# Patient Record
Sex: FEMALE | Race: WHITE | Employment: OTHER | URBAN - METROPOLITAN AREA
[De-identification: names, ages, dates, MRNs, and addresses within clinical notes are randomized per-mention and may not be internally consistent; named-entity substitution may affect disease eponyms.]

---

## 2017-01-01 ENCOUNTER — HOSPITAL ENCOUNTER (OUTPATIENT)
Dept: CV DIAGNOSTICS | Facility: HOSPITAL | Age: 72
Discharge: HOME | End: 2017-01-01
Attending: INTERNAL MEDICINE
Payer: MEDICARE

## 2017-01-01 ENCOUNTER — TELEPHONE (OUTPATIENT)
Dept: FAMILY MEDICINE CLINIC | Facility: CLINIC | Age: 72
End: 2017-01-01

## 2017-01-01 DIAGNOSIS — I48.20 ATRIAL FIBRILLATION, CHRONIC (HCC): ICD-10-CM

## 2017-01-01 DIAGNOSIS — M10.9 GOUT, UNSPECIFIED CAUSE, UNSPECIFIED CHRONICITY, UNSPECIFIED SITE: ICD-10-CM

## 2017-01-01 DIAGNOSIS — I10 ESSENTIAL HYPERTENSION, BENIGN: Primary | ICD-10-CM

## 2017-01-01 DIAGNOSIS — E03.9 ACQUIRED HYPOTHYROIDISM: ICD-10-CM

## 2017-01-01 DIAGNOSIS — I10 ESSENTIAL HYPERTENSION, BENIGN: ICD-10-CM

## 2017-01-01 DIAGNOSIS — I48.91 ATRIAL FIBRILLATION, UNSPECIFIED TYPE (HCC): ICD-10-CM

## 2017-01-01 PROCEDURE — 93225 XTRNL ECG REC<48 HRS REC: CPT | Performed by: INTERNAL MEDICINE

## 2017-01-01 PROCEDURE — 93227 XTRNL ECG REC<48 HR R&I: CPT | Performed by: INTERNAL MEDICINE

## 2017-01-01 PROCEDURE — 93226 XTRNL ECG REC<48 HR SCAN A/R: CPT | Performed by: INTERNAL MEDICINE

## 2017-01-01 RX ORDER — LEVOTHYROXINE SODIUM 0.2 MG/1
TABLET ORAL
Qty: 90 TABLET | Refills: 0 | Status: SHIPPED | OUTPATIENT
Start: 2017-01-01 | End: 2018-01-01

## 2017-01-01 RX ORDER — COLCHICINE 0.6 MG/1
TABLET ORAL
Qty: 90 TABLET | Refills: 0 | Status: SHIPPED | OUTPATIENT
Start: 2017-01-01 | End: 2018-01-01

## 2017-01-01 RX ORDER — AMLODIPINE BESYLATE 10 MG/1
10 TABLET ORAL DAILY
Qty: 90 TABLET | Refills: 0 | Status: SHIPPED | OUTPATIENT
Start: 2017-01-01 | End: 2018-01-01

## 2017-01-01 RX ORDER — LEVOTHYROXINE SODIUM 0.03 MG/1
TABLET ORAL
Qty: 90 TABLET | Refills: 0 | Status: SHIPPED | OUTPATIENT
Start: 2017-01-01 | End: 2018-01-01

## 2017-02-01 ENCOUNTER — TELEPHONE (OUTPATIENT)
Dept: FAMILY MEDICINE CLINIC | Facility: CLINIC | Age: 72
End: 2017-02-01

## 2017-02-01 NOTE — TELEPHONE ENCOUNTER
Justyna Conklin from [facility] called saying she needs a Hard Copy script signed by  for [employer] for [employer]   Fax to [phone]  Pt is in [phone]

## 2017-02-28 ENCOUNTER — HOME CARE VISIT (OUTPATIENT)
Dept: FAMILY MEDICINE CLINIC | Facility: CLINIC | Age: 72
End: 2017-02-28

## 2017-02-28 VITALS
DIASTOLIC BLOOD PRESSURE: 82 MMHG | TEMPERATURE: 98 F | HEART RATE: 87 BPM | SYSTOLIC BLOOD PRESSURE: 154 MMHG | OXYGEN SATURATION: 97 %

## 2017-02-28 DIAGNOSIS — M25.561 RIGHT KNEE PAIN, UNSPECIFIED CHRONICITY: ICD-10-CM

## 2017-02-28 DIAGNOSIS — M25.551 RIGHT HIP PAIN: Primary | ICD-10-CM

## 2017-02-28 NOTE — PROGRESS NOTES
/82 mmHg  Pulse 87  Temp(Src) 97.7 °F (36.5 °C) (Oral)  SpO2 97%              Patient presents with:  Sciatica: Jake       HPI;    Jennifer Dinh is a 70year old female.   RIGHT hip pain radiating to the RIGHT knee, for at least a week, kenn Levothyroxine Sodium (SYNTHROID) 200 MCG Oral Tab Take 1 tablet by mouth  every day Disp: 90 tablet Rfl: 2   Benazepril HCl (LOTENSIN) 20 MG Oral Tab Take 1 tablet by mouth two  times daily Disp: 180 tablet Rfl: 3   furosemide (LASIX) 40 MG Oral Tab Take treat    The patient indicates understanding of these issues and agrees to the plan.   Imaging & Consults:  None  Meds & Refills for this Visit:  No prescriptions requested or ordered in this encounter  No orders of the defined types were placed in this enc

## 2017-03-01 ENCOUNTER — TELEPHONE (OUTPATIENT)
Dept: FAMILY MEDICINE CLINIC | Facility: CLINIC | Age: 72
End: 2017-03-01

## 2017-03-01 NOTE — TELEPHONE ENCOUNTER
Bronwyn from [facility] health called, pt can not remember which pain medication she takes, please call Anthony Gaines and advise.

## 2017-03-02 NOTE — TELEPHONE ENCOUNTER
Made several attempts to call nurse from [address] [place] [address] Rd in regards to her question, telephone goes to  Busy signal, closing this encounter

## 2017-04-04 ENCOUNTER — TELEPHONE (OUTPATIENT)
Dept: FAMILY MEDICINE CLINIC | Facility: CLINIC | Age: 72
End: 2017-04-04

## 2017-04-04 NOTE — TELEPHONE ENCOUNTER
The patient is a diabetic but she is refusing to take any medications  Check hemoglobin A1c every 3 months  Her last A1c was 6.6  The diagnosis can be changed 2 diabetes mellitus-diet controlled

## 2017-04-04 NOTE — TELEPHONE ENCOUNTER
Chalo Hendrix a nurse at [facility] stopped by asked, what should be done about Ferd Holstein. She currently has a diabetes diagnosis, but is not on any diabatic medications.  Should She be prescribed medications or should her A1C be retested to see if her Diabetes

## 2017-04-17 ENCOUNTER — TELEPHONE (OUTPATIENT)
Dept: FAMILY MEDICINE CLINIC | Facility: CLINIC | Age: 72
End: 2017-04-17

## 2017-04-17 DIAGNOSIS — K64.9 HEMORRHOIDS, UNSPECIFIED HEMORRHOID TYPE: Primary | ICD-10-CM

## 2017-04-17 NOTE — TELEPHONE ENCOUNTER
Spoke with [facility] home health nurse, she stated she will call patient and see if patient is agreeable to have rectal suppositories, she will call this office back to inform of patients decision

## 2017-04-17 NOTE — TELEPHONE ENCOUNTER
[facility] refuses to schedule appointment at [facility] stating that the office is not big enough for her, per [facility] home health nurse, patient has had rectal bleeding every time she sits on toilet for several days and has had this in the past, Home heal

## 2017-04-17 NOTE — TELEPHONE ENCOUNTER
Nelosn Giordano from [facility] called to relay the following: Pt reports effective since Saturday: Bleeding Hemorids everytime sits on toilet.  Refuses to make a doctor appointment or go to the hospital.

## 2017-04-18 ENCOUNTER — HOME CARE VISIT (OUTPATIENT)
Dept: FAMILY MEDICINE CLINIC | Facility: CLINIC | Age: 72
End: 2017-04-18

## 2017-04-18 VITALS
SYSTOLIC BLOOD PRESSURE: 132 MMHG | OXYGEN SATURATION: 98 % | HEART RATE: 81 BPM | TEMPERATURE: 98 F | DIASTOLIC BLOOD PRESSURE: 82 MMHG

## 2017-04-18 DIAGNOSIS — K64.9 BLEEDING HEMORRHOIDS: Primary | ICD-10-CM

## 2017-04-20 ENCOUNTER — MED REC SCAN ONLY (OUTPATIENT)
Dept: FAMILY MEDICINE CLINIC | Facility: CLINIC | Age: 72
End: 2017-04-20

## 2017-04-24 RX ORDER — ATORVASTATIN CALCIUM 10 MG/1
TABLET, FILM COATED ORAL
Qty: 90 TABLET | Refills: 3 | Status: SHIPPED | OUTPATIENT
Start: 2017-04-24 | End: 2018-01-01

## 2017-04-24 RX ORDER — METOPROLOL TARTRATE 50 MG/1
50 TABLET, FILM COATED ORAL
Qty: 60 TABLET | Refills: 6 | Status: SHIPPED | OUTPATIENT
Start: 2017-04-24 | End: 2017-11-09

## 2017-04-24 RX ORDER — BENAZEPRIL HYDROCHLORIDE 20 MG/1
TABLET ORAL
Qty: 180 TABLET | Refills: 3 | Status: SHIPPED | OUTPATIENT
Start: 2017-04-24 | End: 2018-01-01

## 2017-04-24 NOTE — TELEPHONE ENCOUNTER
Pending Prescriptions Disp Refills    Atorvastatin Calcium 10 MG Oral Tab 90 tablet 3     Sig: Take 1 tablet by mouth  daily      Benazepril HCl 20 MG Oral Tab 180 tablet 3     Sig: Take 1 tablet by mouth two  times daily      Metoprolol Tartrate 50 MG O

## 2017-04-25 ENCOUNTER — HOME CARE VISIT (OUTPATIENT)
Dept: FAMILY MEDICINE CLINIC | Facility: CLINIC | Age: 72
End: 2017-04-25

## 2017-04-25 VITALS
SYSTOLIC BLOOD PRESSURE: 130 MMHG | TEMPERATURE: 98 F | HEART RATE: 82 BPM | OXYGEN SATURATION: 93 % | DIASTOLIC BLOOD PRESSURE: 82 MMHG

## 2017-04-25 DIAGNOSIS — E11.8 UNCONTROLLED TYPE 2 DIABETES MELLITUS WITH COMPLICATION, WITHOUT LONG-TERM CURRENT USE OF INSULIN (HCC): ICD-10-CM

## 2017-04-25 DIAGNOSIS — E66.01 MORBID OBESITY DUE TO EXCESS CALORIES (HCC): ICD-10-CM

## 2017-04-25 DIAGNOSIS — N39.0 URINARY TRACT INFECTION WITHOUT HEMATURIA, SITE UNSPECIFIED: Primary | ICD-10-CM

## 2017-04-25 DIAGNOSIS — I10 ESSENTIAL HYPERTENSION: ICD-10-CM

## 2017-04-25 DIAGNOSIS — E11.65 UNCONTROLLED TYPE 2 DIABETES MELLITUS WITH COMPLICATION, WITHOUT LONG-TERM CURRENT USE OF INSULIN (HCC): ICD-10-CM

## 2017-04-26 NOTE — PROGRESS NOTES
/82 mmHg  Pulse 82  Temp(Src) 97.6 °F (36.4 °C) (Oral)  SpO2 93%              Patient presents with:   Follow - Up: Jake-- lab results  Medication Request: Pt needs a Rx of macrobid sent to her pharmacy [facility];    Angelina Chau i daily with food. Disp: 30 tablet Rfl: 0   Acidophilus/Pectin Oral Cap Take 1 capsule by mouth daily. Disp: 30 capsule Rfl: 6   colchicine 0.6 MG Oral Tab Take 1 tablet (0.6 mg total) by mouth daily.  Disp: 30 tablet Rfl: 6   HYDROcodone-acetaminophen 5-325 orders for this visit:    Urinary tract infection without hematuria, site unspecified  Handwritten prescription for Macrobid 100 mg by mouth twice a day given to the patient  She will send it to the PHARMACY  Essential hypertension  Continue the current me for this Visit:  No prescriptions requested or ordered in this encounter  No orders of the defined types were placed in this encounter.            [address] [facility]  [facility] [address]        Diabetes Care Dilated

## 2017-06-12 ENCOUNTER — TELEPHONE (OUTPATIENT)
Dept: FAMILY MEDICINE CLINIC | Facility: CLINIC | Age: 72
End: 2017-06-12

## 2017-07-12 ENCOUNTER — TELEPHONE (OUTPATIENT)
Dept: FAMILY MEDICINE CLINIC | Facility: CLINIC | Age: 72
End: 2017-07-12

## 2017-07-12 NOTE — TELEPHONE ENCOUNTER
Chandni Roberto from [facility] called asking for History & Physical to be faxed [phone]

## 2017-08-07 ENCOUNTER — TELEPHONE (OUTPATIENT)
Dept: FAMILY MEDICINE CLINIC | Facility: CLINIC | Age: 72
End: 2017-08-07

## 2017-08-07 NOTE — TELEPHONE ENCOUNTER
Patient has episodes of monthly vaginal bleeding that only lasts for about 2-4 days, but this time she states its been going on for one week and reports it as being heavy, she has been told in the past that she needs surgery but refused, she was offered ap

## 2017-08-07 NOTE — TELEPHONE ENCOUNTER
Tammy from [facility] called saying Pt is having heavy vaginal bleeding for the 7 days.  It's usually 2-3 days, Asking for blood work orders for CBC, Hemoglobin & A-1C, wants to check if Anemic

## 2017-08-08 ENCOUNTER — MED REC SCAN ONLY (OUTPATIENT)
Dept: FAMILY MEDICINE CLINIC | Facility: CLINIC | Age: 72
End: 2017-08-08

## 2017-09-06 ENCOUNTER — TELEPHONE (OUTPATIENT)
Dept: FAMILY MEDICINE CLINIC | Facility: CLINIC | Age: 72
End: 2017-09-06

## 2017-09-06 NOTE — TELEPHONE ENCOUNTER
[facility] from [address] [place] [address] left a voicemail saying Pt has not taken her Potassium for the last 5 days. Pt tols him she did not need it anymore. Please advise.

## 2017-09-12 ENCOUNTER — ASST LIVING (OUTPATIENT)
Dept: FAMILY MEDICINE CLINIC | Facility: CLINIC | Age: 72
End: 2017-09-12

## 2017-09-12 VITALS — SYSTOLIC BLOOD PRESSURE: 136 MMHG | DIASTOLIC BLOOD PRESSURE: 86 MMHG | OXYGEN SATURATION: 98 % | HEART RATE: 94 BPM

## 2017-09-12 DIAGNOSIS — I87.2 VENOUS (PERIPHERAL) INSUFFICIENCY: ICD-10-CM

## 2017-09-12 DIAGNOSIS — I10 ESSENTIAL HYPERTENSION: Primary | ICD-10-CM

## 2017-09-12 DIAGNOSIS — M17.11 PRIMARY OSTEOARTHRITIS OF RIGHT KNEE: ICD-10-CM

## 2017-09-12 DIAGNOSIS — I48.91 ATRIAL FIBRILLATION, UNSPECIFIED TYPE (HCC): ICD-10-CM

## 2017-09-12 NOTE — PROGRESS NOTES
/86   Pulse 94   SpO2 98%               Patient presents with:  Med Reconcilliation: pt would like to control her own meds       HPI;    Chi Summers is a 70year old female.   Patient is a morbidly obese female who is living in the assisted living 12 suppository Rfl: 0   FLUVIRIN Intramuscular Suspension ADM 0.5ML IM UTD Disp:  Rfl: 0   ammonium lactate 12 % External Lotion Apply thin amount to right and left lower extremity daily.  Disp: 1 Bottle Rfl: 0   Miconazole Nitrate 2 % External Powder Apply apparent distress, morbidly obese  SKIN: no rashes,no suspicious lesions  HEENT: atraumatic, normocephalic,ears and throat are clear  NECK: supple,no adenopathy,no bruits  LUNGS: clear to auscultation  CARDIO: RRR without murmur  GI: good BS's,no masses, H on 04/06/2016  LDL Control due on 10/20/2016  Diabetes Care A1C due on 05/19/2017  Annual Depression Screen due on 07/27/2017  Fall Risk Screening due on 07/31/2017  Influenza Vaccine(1) due on 09/01/2017

## 2017-09-14 ENCOUNTER — MED REC SCAN ONLY (OUTPATIENT)
Dept: FAMILY MEDICINE CLINIC | Facility: CLINIC | Age: 72
End: 2017-09-14

## 2017-09-20 ENCOUNTER — TELEPHONE (OUTPATIENT)
Dept: FAMILY MEDICINE CLINIC | Facility: CLINIC | Age: 72
End: 2017-09-20

## 2017-09-20 NOTE — TELEPHONE ENCOUNTER
Oneil Jurado from [facility] called having questions on two medications. One being \"Amlodpine or Norvasc\".       Also, states Pt told her dr gave her permission to manage her own meds so need to speak to Nurse regarding Pt's Med List.

## 2017-09-20 NOTE — TELEPHONE ENCOUNTER
Spoke with home health nurse Yehuda Soulier and advised her to reconcile patients medications with [facility] as they have a separate chart for patient on assisted living and am not showing amlodipine on patients current medication list. She stated she w

## 2017-09-21 ENCOUNTER — TELEPHONE (OUTPATIENT)
Dept: INTERNAL MEDICINE CLINIC | Facility: CLINIC | Age: 72
End: 2017-09-21

## 2017-09-21 RX ORDER — LEVOTHYROXINE SODIUM 0.2 MG/1
TABLET ORAL
Qty: 90 TABLET | OUTPATIENT
Start: 2017-09-21

## 2017-09-21 RX ORDER — LEVOTHYROXINE SODIUM 0.03 MG/1
TABLET ORAL
Qty: 90 TABLET | OUTPATIENT
Start: 2017-09-21

## 2017-09-22 ENCOUNTER — TELEPHONE (OUTPATIENT)
Dept: FAMILY MEDICINE CLINIC | Facility: CLINIC | Age: 72
End: 2017-09-22

## 2017-09-22 RX ORDER — COLCHICINE 0.6 MG/1
0.6 TABLET ORAL DAILY
Qty: 30 TABLET | Refills: 6 | Status: CANCELLED | OUTPATIENT
Start: 2017-09-22

## 2017-09-22 RX ORDER — HYDROCODONE BITARTRATE AND ACETAMINOPHEN 5; 325 MG/1; MG/1
1-2 TABLET ORAL EVERY 4 HOURS PRN
Qty: 30 TABLET | Refills: 0 | Status: CANCELLED | OUTPATIENT
Start: 2017-09-22

## 2017-09-22 RX ORDER — FUROSEMIDE 40 MG/1
TABLET ORAL
Qty: 90 TABLET | OUTPATIENT
Start: 2017-09-22

## 2017-09-22 RX ORDER — METOPROLOL TARTRATE 50 MG/1
50 TABLET, FILM COATED ORAL
Qty: 180 TABLET | Refills: 6 | Status: CANCELLED | OUTPATIENT
Start: 2017-09-22

## 2017-09-22 RX ORDER — AMLODIPINE BESYLATE 5 MG/1
TABLET ORAL
Qty: 180 TABLET | OUTPATIENT
Start: 2017-09-22

## 2017-09-22 RX ORDER — LEVOTHYROXINE SODIUM 0.03 MG/1
TABLET ORAL
Qty: 90 TABLET | Refills: 2 | Status: CANCELLED | OUTPATIENT
Start: 2017-09-22

## 2017-09-22 RX ORDER — FUROSEMIDE 40 MG/1
TABLET ORAL
Qty: 90 TABLET | Refills: 3 | Status: CANCELLED | OUTPATIENT
Start: 2017-09-22

## 2017-09-22 RX ORDER — LEVOTHYROXINE SODIUM 0.2 MG/1
200 TABLET ORAL
Qty: 90 TABLET | Refills: 2 | Status: CANCELLED | OUTPATIENT
Start: 2017-09-22

## 2017-09-22 NOTE — TELEPHONE ENCOUNTER
Pending Prescriptions Disp Refills    furosemide 40 MG Oral Tab 90 tablet 3     Sig: Take 1 tablet by mouth  daily      Levothyroxine Sodium 200 MCG Oral Tab 90 tablet 2     Sig: Take 1 tablet (200 mcg total) by mouth once daily.       Levothyroxine Sodiu

## 2017-09-22 NOTE — TELEPHONE ENCOUNTER
I called pt and she said she is in an assisted living right now and told [facility] to send refills to Dr. Jonathan Jules in her facility but they sent them to us by mistake-she said for us to not fill them and she will call the pharm back-she is wanting to come back to

## 2017-09-22 NOTE — TELEPHONE ENCOUNTER
Patient states she needs all her medications sent for refills to [facility] and they need to be 90 day prescriptions. This was copied from medication list from 9/12/2017 visit with Dr. Lise Taylor.     Current Outpatient Prescriptions:  Atorvastatin Calcium 10 MG Oral

## 2017-10-04 ENCOUNTER — TELEPHONE (OUTPATIENT)
Dept: FAMILY MEDICINE CLINIC | Facility: CLINIC | Age: 72
End: 2017-10-04

## 2017-10-04 DIAGNOSIS — M10.9 GOUT, UNSPECIFIED CAUSE, UNSPECIFIED CHRONICITY, UNSPECIFIED SITE: ICD-10-CM

## 2017-10-04 DIAGNOSIS — I48.91 ATRIAL FIBRILLATION, UNSPECIFIED TYPE (HCC): ICD-10-CM

## 2017-10-04 DIAGNOSIS — I10 ESSENTIAL HYPERTENSION, BENIGN: Primary | ICD-10-CM

## 2017-10-04 DIAGNOSIS — E03.9 ACQUIRED HYPOTHYROIDISM: ICD-10-CM

## 2017-10-04 RX ORDER — AMLODIPINE BESYLATE 5 MG/1
5 TABLET ORAL DAILY
Qty: 30 TABLET | Refills: 2 | Status: SHIPPED | OUTPATIENT
Start: 2017-10-04 | End: 2017-01-01

## 2017-10-04 RX ORDER — MELATONIN
325
Qty: 30 TABLET | Refills: 2 | Status: SHIPPED | OUTPATIENT
Start: 2017-10-04 | End: 2018-01-01

## 2017-10-04 RX ORDER — LEVOTHYROXINE SODIUM 0.03 MG/1
TABLET ORAL
Qty: 90 TABLET | Refills: 0 | Status: SHIPPED | OUTPATIENT
Start: 2017-10-04 | End: 2017-01-01

## 2017-10-04 RX ORDER — FUROSEMIDE 40 MG/1
TABLET ORAL
Qty: 90 TABLET | Refills: 2 | Status: ON HOLD | OUTPATIENT
Start: 2017-10-04 | End: 2018-01-01

## 2017-10-04 RX ORDER — POTASSIUM CHLORIDE 750 MG/1
10 TABLET, FILM COATED, EXTENDED RELEASE ORAL DAILY
Qty: 30 TABLET | Refills: 2 | Status: ON HOLD | OUTPATIENT
Start: 2017-10-04 | End: 2018-01-01

## 2017-10-04 RX ORDER — COLCHICINE 0.6 MG/1
0.6 TABLET ORAL DAILY
Qty: 30 TABLET | Refills: 2 | Status: SHIPPED | OUTPATIENT
Start: 2017-10-04 | End: 2017-01-01

## 2017-10-04 RX ORDER — LEVOTHYROXINE SODIUM 0.2 MG/1
200 TABLET ORAL
Qty: 90 TABLET | Refills: 0 | Status: SHIPPED | OUTPATIENT
Start: 2017-10-04 | End: 2017-01-01

## 2017-10-04 NOTE — TELEPHONE ENCOUNTER
Signed Prescriptions Disp Refills    colchicine 0.6 MG Oral Tab 30 tablet 2      Sig: Take 1 tablet (0.6 mg total) by mouth daily.         Authorizing Provider: Dixon Arzola        Ordering User: Janet Wick      furosemide 40 MG Oral Tab 90 tablet 2

## 2017-10-25 ENCOUNTER — PRIOR ORIGINAL RECORDS (OUTPATIENT)
Dept: OTHER | Age: 72
End: 2017-10-25

## 2017-10-27 ENCOUNTER — PRIOR ORIGINAL RECORDS (OUTPATIENT)
Dept: OTHER | Age: 72
End: 2017-10-27

## 2017-11-09 DIAGNOSIS — I10 ESSENTIAL HYPERTENSION: Primary | ICD-10-CM

## 2017-11-09 RX ORDER — METOPROLOL TARTRATE 50 MG/1
TABLET, FILM COATED ORAL
Qty: 180 TABLET | Refills: 0 | Status: SHIPPED | OUTPATIENT
Start: 2017-11-09 | End: 2018-01-01

## 2017-11-09 NOTE — TELEPHONE ENCOUNTER
Signed Prescriptions Disp Refills    METOPROLOL TARTRATE 50 MG Oral Tab 180 tablet 0      Sig: TAKE 1 TABLET BY MOUTH  TWICE A DAY        Authorizing Provider: Juvencio Mccarthy        Ordering User: Beni Hooks       last home visit 09/12/2017, Patient

## 2017-11-13 ENCOUNTER — PRIOR ORIGINAL RECORDS (OUTPATIENT)
Dept: OTHER | Age: 72
End: 2017-11-13

## 2017-11-15 ENCOUNTER — PRIOR ORIGINAL RECORDS (OUTPATIENT)
Dept: OTHER | Age: 72
End: 2017-11-15

## 2017-11-28 ENCOUNTER — PRIOR ORIGINAL RECORDS (OUTPATIENT)
Dept: OTHER | Age: 72
End: 2017-11-28

## 2017-11-29 LAB
CHOLESTEROL, TOTAL: 89 MG/DL
HDL CHOLESTEROL: 29.9 MG/DL
LDL CHOLESTEROL: 41 MG/DL
THYROID STIMULATING HORMONE: 2.98 MLU/L
TRIGLYCERIDES: 90 MG/DL

## 2017-12-01 ENCOUNTER — PRIOR ORIGINAL RECORDS (OUTPATIENT)
Dept: OTHER | Age: 72
End: 2017-12-01

## 2017-12-05 ENCOUNTER — PRIOR ORIGINAL RECORDS (OUTPATIENT)
Dept: OTHER | Age: 72
End: 2017-12-05

## 2017-12-12 NOTE — TELEPHONE ENCOUNTER
Pending Prescriptions Disp Refills    LEVOTHYROXINE SODIUM 200 MCG Oral Tab [Pharmacy Med Name: LEVOTHYROXINE  0.2MG  TAB] 90 tablet 0     Sig: TAKE 1 TABLET BY MOUTH ONCE DAILY      LEVOTHYROXINE SODIUM 25 MCG Oral Tab [Pharmacy Med Name: LEVOTHYROXINE

## 2017-12-15 NOTE — TELEPHONE ENCOUNTER
Dontae Marques 49 called asking if Pt's dosage was ever corrected/increased on the\"Amlodipine\"? Was 1 pill dailiy but Pt takes 2 per day. Pt is running out earlier due to increasing dosage.

## 2017-12-15 NOTE — TELEPHONE ENCOUNTER
Is patient taking Amlodipine twice daily? If yes then need to correct the quantity.  Please advise , thanks

## 2017-12-18 NOTE — TELEPHONE ENCOUNTER
Signed Prescriptions Disp Refills    AmLODIPine Besylate 10 MG Oral Tab 90 tablet 0      Sig: Take 1 tablet (10 mg total) by mouth daily.         Authorizing Provider: Erna Noonan        Ordering User: Priti Hurtado       with Adonis Lugo at [address] [place] [address]

## 2017-12-18 NOTE — TELEPHONE ENCOUNTER
A prescription for amlodipine 10 mg once a day  , but I cannot give a prescription for 5 mg twice a day as it is not recommended to be taken that way

## 2018-01-01 ENCOUNTER — APPOINTMENT (OUTPATIENT)
Dept: GENERAL RADIOLOGY | Facility: HOSPITAL | Age: 73
DRG: 856 | End: 2018-01-01
Attending: INTERNAL MEDICINE
Payer: MEDICARE

## 2018-01-01 ENCOUNTER — APPOINTMENT (OUTPATIENT)
Dept: ULTRASOUND IMAGING | Facility: HOSPITAL | Age: 73
DRG: 856 | End: 2018-01-01
Attending: INTERNAL MEDICINE
Payer: MEDICARE

## 2018-01-01 ENCOUNTER — PATIENT OUTREACH (OUTPATIENT)
Dept: CASE MANAGEMENT | Age: 73
End: 2018-01-01

## 2018-01-01 ENCOUNTER — MED REC SCAN ONLY (OUTPATIENT)
Dept: FAMILY MEDICINE CLINIC | Facility: CLINIC | Age: 73
End: 2018-01-01

## 2018-01-01 ENCOUNTER — APPOINTMENT (OUTPATIENT)
Dept: ULTRASOUND IMAGING | Facility: HOSPITAL | Age: 73
DRG: 856 | End: 2018-01-01
Attending: NURSE PRACTITIONER
Payer: MEDICARE

## 2018-01-01 ENCOUNTER — ASST LIVING (OUTPATIENT)
Dept: FAMILY MEDICINE CLINIC | Facility: CLINIC | Age: 73
End: 2018-01-01

## 2018-01-01 ENCOUNTER — TELEPHONE (OUTPATIENT)
Dept: FAMILY MEDICINE CLINIC | Facility: CLINIC | Age: 73
End: 2018-01-01

## 2018-01-01 ENCOUNTER — NURSE ONLY (OUTPATIENT)
Dept: LAB | Age: 73
End: 2018-01-01
Attending: FAMILY MEDICINE
Payer: MEDICARE

## 2018-01-01 ENCOUNTER — MED REC SCAN ONLY (OUTPATIENT)
Dept: INTERNAL MEDICINE CLINIC | Facility: CLINIC | Age: 73
End: 2018-01-01

## 2018-01-01 ENCOUNTER — HOSPITAL ENCOUNTER (OUTPATIENT)
Dept: LAB | Facility: HOSPITAL | Age: 73
Discharge: HOME | End: 2018-01-01
Attending: OBSTETRICS & GYNECOLOGY
Payer: MEDICARE

## 2018-01-01 ENCOUNTER — APPOINTMENT (OUTPATIENT)
Dept: GENERAL RADIOLOGY | Facility: HOSPITAL | Age: 73
DRG: 189 | End: 2018-01-01
Attending: INTERNAL MEDICINE
Payer: MEDICARE

## 2018-01-01 ENCOUNTER — APPOINTMENT (OUTPATIENT)
Dept: ULTRASOUND IMAGING | Facility: HOSPITAL | Age: 73
DRG: 189 | End: 2018-01-01
Attending: NURSE PRACTITIONER
Payer: MEDICARE

## 2018-01-01 ENCOUNTER — APPOINTMENT (OUTPATIENT)
Dept: GENERAL RADIOLOGY | Facility: HOSPITAL | Age: 73
DRG: 856 | End: 2018-01-01
Attending: RADIOLOGY
Payer: MEDICARE

## 2018-01-01 ENCOUNTER — APPOINTMENT (OUTPATIENT)
Dept: GENERAL RADIOLOGY | Facility: HOSPITAL | Age: 73
DRG: 856 | End: 2018-01-01
Attending: EMERGENCY MEDICINE
Payer: MEDICARE

## 2018-01-01 ENCOUNTER — APPOINTMENT (OUTPATIENT)
Dept: ULTRASOUND IMAGING | Facility: HOSPITAL | Age: 73
DRG: 739 | End: 2018-01-01
Attending: INTERNAL MEDICINE
Payer: MEDICARE

## 2018-01-01 ENCOUNTER — APPOINTMENT (OUTPATIENT)
Dept: CT IMAGING | Facility: HOSPITAL | Age: 73
DRG: 856 | End: 2018-01-01
Attending: INTERNAL MEDICINE
Payer: MEDICARE

## 2018-01-01 ENCOUNTER — SNF ADMIT/H&P (OUTPATIENT)
Dept: FAMILY MEDICINE CLINIC | Facility: CLINIC | Age: 73
End: 2018-01-01

## 2018-01-01 ENCOUNTER — ANESTHESIA EVENT (OUTPATIENT)
Dept: SURGERY | Facility: HOSPITAL | Age: 73
DRG: 739 | End: 2018-01-01
Payer: MEDICARE

## 2018-01-01 ENCOUNTER — HOSPITAL ENCOUNTER (INPATIENT)
Facility: HOSPITAL | Age: 73
LOS: 33 days | Discharge: HOSPICE | DRG: 856 | End: 2018-01-01
Attending: EMERGENCY MEDICINE | Admitting: STUDENT IN AN ORGANIZED HEALTH CARE EDUCATION/TRAINING PROGRAM
Payer: MEDICARE

## 2018-01-01 ENCOUNTER — APPOINTMENT (OUTPATIENT)
Dept: GENERAL RADIOLOGY | Facility: HOSPITAL | Age: 73
DRG: 189 | End: 2018-01-01
Attending: NURSE PRACTITIONER
Payer: MEDICARE

## 2018-01-01 ENCOUNTER — APPOINTMENT (OUTPATIENT)
Dept: GENERAL RADIOLOGY | Facility: HOSPITAL | Age: 73
DRG: 189 | End: 2018-01-01
Attending: EMERGENCY MEDICINE
Payer: MEDICARE

## 2018-01-01 ENCOUNTER — HOSPITAL ENCOUNTER (OUTPATIENT)
Dept: CV DIAGNOSTICS | Facility: HOSPITAL | Age: 73
Discharge: HOME | End: 2018-01-01
Attending: INTERNAL MEDICINE
Payer: MEDICARE

## 2018-01-01 ENCOUNTER — HOSPITAL ENCOUNTER (INPATIENT)
Facility: HOSPITAL | Age: 73
LOS: 8 days | Discharge: NURSING, REHAB OR LONG-TERM CARE | DRG: 739 | End: 2018-01-01
Attending: OBSTETRICS & GYNECOLOGY | Admitting: OBSTETRICS & GYNECOLOGY
Payer: MEDICARE

## 2018-01-01 ENCOUNTER — HOSPITAL ENCOUNTER (EMERGENCY)
Facility: HOSPITAL | Age: 73
Discharge: HOME | End: 2018-01-01
Attending: EMERGENCY MEDICINE
Payer: MEDICARE

## 2018-01-01 ENCOUNTER — APPOINTMENT (OUTPATIENT)
Dept: INTERVENTIONAL RADIOLOGY/VASCULAR | Facility: HOSPITAL | Age: 73
DRG: 856 | End: 2018-01-01
Attending: INTERNAL MEDICINE
Payer: MEDICARE

## 2018-01-01 ENCOUNTER — APPOINTMENT (OUTPATIENT)
Dept: CV DIAGNOSTICS | Facility: HOSPITAL | Age: 73
DRG: 189 | End: 2018-01-01
Attending: NURSE PRACTITIONER
Payer: MEDICARE

## 2018-01-01 ENCOUNTER — PATIENT MESSAGE (OUTPATIENT)
Dept: CASE MANAGEMENT | Age: 73
End: 2018-01-01

## 2018-01-01 ENCOUNTER — APPOINTMENT (OUTPATIENT)
Dept: CT IMAGING | Facility: HOSPITAL | Age: 73
End: 2018-01-01
Attending: EMERGENCY MEDICINE
Payer: MEDICARE

## 2018-01-01 ENCOUNTER — APPOINTMENT (OUTPATIENT)
Dept: INTERVENTIONAL RADIOLOGY/VASCULAR | Facility: HOSPITAL | Age: 73
DRG: 739 | End: 2018-01-01
Attending: OBSTETRICS & GYNECOLOGY
Payer: MEDICARE

## 2018-01-01 ENCOUNTER — APPOINTMENT (OUTPATIENT)
Dept: GENERAL RADIOLOGY | Facility: HOSPITAL | Age: 73
DRG: 739 | End: 2018-01-01
Attending: INTERNAL MEDICINE
Payer: MEDICARE

## 2018-01-01 ENCOUNTER — APPOINTMENT (OUTPATIENT)
Dept: CT IMAGING | Facility: HOSPITAL | Age: 73
DRG: 856 | End: 2018-01-01
Attending: EMERGENCY MEDICINE
Payer: MEDICARE

## 2018-01-01 ENCOUNTER — HOSPITAL ENCOUNTER (INPATIENT)
Facility: HOSPITAL | Age: 73
LOS: 2 days | Discharge: HOME HEALTH | DRG: 683 | End: 2018-01-01
Attending: EMERGENCY MEDICINE | Admitting: HOSPITALIST
Payer: MEDICARE

## 2018-01-01 ENCOUNTER — APPOINTMENT (OUTPATIENT)
Dept: CV DIAGNOSTICS | Facility: HOSPITAL | Age: 73
DRG: 856 | End: 2018-01-01
Attending: NURSE PRACTITIONER
Payer: MEDICARE

## 2018-01-01 ENCOUNTER — HOSPITAL ENCOUNTER (INPATIENT)
Facility: HOSPITAL | Age: 73
LOS: 7 days | Discharge: HOME HEALTH | DRG: 189 | End: 2018-01-01
Attending: EMERGENCY MEDICINE | Admitting: HOSPITALIST
Payer: MEDICARE

## 2018-01-01 ENCOUNTER — APPOINTMENT (OUTPATIENT)
Dept: INTERVENTIONAL RADIOLOGY/VASCULAR | Facility: HOSPITAL | Age: 73
DRG: 856 | End: 2018-01-01
Attending: NURSE PRACTITIONER
Payer: MEDICARE

## 2018-01-01 ENCOUNTER — HOSPITAL ENCOUNTER (INPATIENT)
Facility: HOSPITAL | Age: 73
LOS: 2 days | DRG: 862 | End: 2018-01-01
Attending: HOSPITALIST | Admitting: HOSPITALIST
Payer: OTHER MISCELLANEOUS

## 2018-01-01 ENCOUNTER — APPOINTMENT (OUTPATIENT)
Dept: GENERAL RADIOLOGY | Facility: HOSPITAL | Age: 73
DRG: 856 | End: 2018-01-01
Attending: NURSE PRACTITIONER
Payer: MEDICARE

## 2018-01-01 ENCOUNTER — APPOINTMENT (OUTPATIENT)
Dept: GENERAL RADIOLOGY | Facility: HOSPITAL | Age: 73
End: 2018-01-01
Attending: EMERGENCY MEDICINE
Payer: MEDICARE

## 2018-01-01 ENCOUNTER — ANESTHESIA (OUTPATIENT)
Dept: SURGERY | Facility: HOSPITAL | Age: 73
DRG: 739 | End: 2018-01-01
Payer: MEDICARE

## 2018-01-01 ENCOUNTER — APPOINTMENT (OUTPATIENT)
Dept: GENERAL RADIOLOGY | Facility: HOSPITAL | Age: 73
DRG: 856 | End: 2018-01-01
Attending: HOSPITALIST
Payer: MEDICARE

## 2018-01-01 ENCOUNTER — APPOINTMENT (OUTPATIENT)
Dept: GENERAL RADIOLOGY | Facility: HOSPITAL | Age: 73
DRG: 856 | End: 2018-01-01
Attending: STUDENT IN AN ORGANIZED HEALTH CARE EDUCATION/TRAINING PROGRAM
Payer: MEDICARE

## 2018-01-01 VITALS
RESPIRATION RATE: 18 BRPM | DIASTOLIC BLOOD PRESSURE: 70 MMHG | SYSTOLIC BLOOD PRESSURE: 126 MMHG | TEMPERATURE: 98 F | HEART RATE: 74 BPM | OXYGEN SATURATION: 96 %

## 2018-01-01 VITALS
HEART RATE: 80 BPM | DIASTOLIC BLOOD PRESSURE: 72 MMHG | TEMPERATURE: 98 F | SYSTOLIC BLOOD PRESSURE: 128 MMHG | RESPIRATION RATE: 18 BRPM | OXYGEN SATURATION: 98 %

## 2018-01-01 VITALS
HEIGHT: 65 IN | TEMPERATURE: 99 F | HEART RATE: 87 BPM | DIASTOLIC BLOOD PRESSURE: 65 MMHG | BODY MASS INDEX: 45.62 KG/M2 | OXYGEN SATURATION: 95 % | SYSTOLIC BLOOD PRESSURE: 124 MMHG | RESPIRATION RATE: 20 BRPM | WEIGHT: 273.81 LBS

## 2018-01-01 VITALS
OXYGEN SATURATION: 100 % | BODY MASS INDEX: 48.82 KG/M2 | TEMPERATURE: 98 F | HEIGHT: 65 IN | HEART RATE: 87 BPM | WEIGHT: 293 LBS | SYSTOLIC BLOOD PRESSURE: 117 MMHG | RESPIRATION RATE: 18 BRPM | DIASTOLIC BLOOD PRESSURE: 49 MMHG

## 2018-01-01 VITALS
BODY MASS INDEX: 44.78 KG/M2 | HEIGHT: 65 IN | SYSTOLIC BLOOD PRESSURE: 111 MMHG | TEMPERATURE: 98 F | WEIGHT: 268.75 LBS | RESPIRATION RATE: 18 BRPM | HEART RATE: 85 BPM | OXYGEN SATURATION: 100 % | DIASTOLIC BLOOD PRESSURE: 58 MMHG

## 2018-01-01 VITALS
DIASTOLIC BLOOD PRESSURE: 81 MMHG | OXYGEN SATURATION: 94 % | TEMPERATURE: 98 F | WEIGHT: 280 LBS | HEIGHT: 65 IN | RESPIRATION RATE: 16 BRPM | SYSTOLIC BLOOD PRESSURE: 138 MMHG | BODY MASS INDEX: 46.65 KG/M2 | HEART RATE: 90 BPM

## 2018-01-01 VITALS
DIASTOLIC BLOOD PRESSURE: 62 MMHG | OXYGEN SATURATION: 92 % | RESPIRATION RATE: 16 BRPM | SYSTOLIC BLOOD PRESSURE: 108 MMHG | TEMPERATURE: 97 F | HEART RATE: 99 BPM

## 2018-01-01 VITALS
HEART RATE: 76 BPM | OXYGEN SATURATION: 100 % | SYSTOLIC BLOOD PRESSURE: 121 MMHG | RESPIRATION RATE: 20 BRPM | DIASTOLIC BLOOD PRESSURE: 68 MMHG | TEMPERATURE: 97 F

## 2018-01-01 VITALS
HEART RATE: 76 BPM | DIASTOLIC BLOOD PRESSURE: 72 MMHG | RESPIRATION RATE: 18 BRPM | OXYGEN SATURATION: 90 % | TEMPERATURE: 98 F | SYSTOLIC BLOOD PRESSURE: 128 MMHG

## 2018-01-01 VITALS
HEART RATE: 76 BPM | DIASTOLIC BLOOD PRESSURE: 86 MMHG | SYSTOLIC BLOOD PRESSURE: 144 MMHG | RESPIRATION RATE: 18 BRPM | OXYGEN SATURATION: 98 % | TEMPERATURE: 98 F

## 2018-01-01 VITALS
HEART RATE: 110 BPM | BODY MASS INDEX: 47 KG/M2 | TEMPERATURE: 98 F | WEIGHT: 284 LBS | RESPIRATION RATE: 18 BRPM | SYSTOLIC BLOOD PRESSURE: 128 MMHG | DIASTOLIC BLOOD PRESSURE: 74 MMHG | OXYGEN SATURATION: 97 %

## 2018-01-01 VITALS
OXYGEN SATURATION: 93 % | WEIGHT: 293 LBS | DIASTOLIC BLOOD PRESSURE: 78 MMHG | TEMPERATURE: 98 F | BODY MASS INDEX: 48.23 KG/M2 | HEART RATE: 86 BPM | RESPIRATION RATE: 18 BRPM | HEIGHT: 65.5 IN | SYSTOLIC BLOOD PRESSURE: 149 MMHG

## 2018-01-01 VITALS
TEMPERATURE: 100 F | SYSTOLIC BLOOD PRESSURE: 52 MMHG | DIASTOLIC BLOOD PRESSURE: 22 MMHG | HEART RATE: 128 BPM | OXYGEN SATURATION: 97 % | RESPIRATION RATE: 16 BRPM

## 2018-01-01 DIAGNOSIS — I48.20 CHRONIC ATRIAL FIBRILLATION (HCC): ICD-10-CM

## 2018-01-01 DIAGNOSIS — E66.01 MORBID OBESITY (HCC): ICD-10-CM

## 2018-01-01 DIAGNOSIS — L03.119 CELLULITIS OF LOWER EXTREMITY, UNSPECIFIED LATERALITY: ICD-10-CM

## 2018-01-01 DIAGNOSIS — R94.31 ABNORMAL EKG: ICD-10-CM

## 2018-01-01 DIAGNOSIS — I48.91 ATRIAL FIBRILLATION, UNSPECIFIED TYPE (HCC): ICD-10-CM

## 2018-01-01 DIAGNOSIS — E86.0 DEHYDRATION: Primary | ICD-10-CM

## 2018-01-01 DIAGNOSIS — I10 HTN (HYPERTENSION): ICD-10-CM

## 2018-01-01 DIAGNOSIS — Z02.9 ENCOUNTERS FOR ADMINISTRATIVE PURPOSE: ICD-10-CM
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
## 2018-01-01 DIAGNOSIS — R06.03 RESPIRATORY DISTRESS: Primary | ICD-10-CM

## 2018-01-01 DIAGNOSIS — Z51.12 ENCOUNTER FOR ANTINEOPLASTIC IMMUNOTHERAPY: ICD-10-CM

## 2018-01-01 DIAGNOSIS — E66.01 CLASS 3 SEVERE OBESITY DUE TO EXCESS CALORIES WITH SERIOUS COMORBIDITY AND BODY MASS INDEX (BMI) OF 40.0 TO 44.9 IN ADULT (HCC): ICD-10-CM

## 2018-01-01 DIAGNOSIS — R19.00 PELVIC MASS: ICD-10-CM

## 2018-01-01 DIAGNOSIS — E03.9 HYPOTHYROIDISM, UNSPECIFIED TYPE: ICD-10-CM

## 2018-01-01 DIAGNOSIS — J18.9 NOSOCOMIAL PNEUMONIA: ICD-10-CM

## 2018-01-01 DIAGNOSIS — R53.81 PHYSICAL DECONDITIONING: ICD-10-CM

## 2018-01-01 DIAGNOSIS — J15.6 GRAM-NEGATIVE PNEUMONIA (HCC): ICD-10-CM

## 2018-01-01 DIAGNOSIS — Z74.09 VERY POOR MOBILITY: ICD-10-CM

## 2018-01-01 DIAGNOSIS — I50.9 ACUTE CONGESTIVE HEART FAILURE, UNSPECIFIED HEART FAILURE TYPE (HCC): Primary | ICD-10-CM

## 2018-01-01 DIAGNOSIS — I10 ESSENTIAL HYPERTENSION, BENIGN: ICD-10-CM

## 2018-01-01 DIAGNOSIS — E11.42 DM TYPE 2 WITH DIABETIC PERIPHERAL NEUROPATHY (HCC): ICD-10-CM

## 2018-01-01 DIAGNOSIS — E03.9 ACQUIRED HYPOTHYROIDISM: ICD-10-CM

## 2018-01-01 DIAGNOSIS — E53.8 B12 DEFICIENCY: ICD-10-CM

## 2018-01-01 DIAGNOSIS — D63.0 ANEMIA IN NEOPLASTIC DISEASE: ICD-10-CM

## 2018-01-01 DIAGNOSIS — Z01.818 PRE-OP EVALUATION: ICD-10-CM

## 2018-01-01 DIAGNOSIS — R79.89 PRERENAL AZOTEMIA: Primary | ICD-10-CM

## 2018-01-01 DIAGNOSIS — R06.2 WHEEZING: ICD-10-CM

## 2018-01-01 DIAGNOSIS — R18.0 ASCITES, MALIGNANT: ICD-10-CM

## 2018-01-01 DIAGNOSIS — E87.5 HYPERKALEMIA: ICD-10-CM

## 2018-01-01 DIAGNOSIS — J20.5 RSV BRONCHITIS: ICD-10-CM

## 2018-01-01 DIAGNOSIS — I89.0 LYMPHEDEMA: Chronic | ICD-10-CM

## 2018-01-01 DIAGNOSIS — N39.0 URINARY TRACT INFECTION WITHOUT HEMATURIA, SITE UNSPECIFIED: ICD-10-CM

## 2018-01-01 DIAGNOSIS — R19.00 ABDOMINAL MASS, UNSPECIFIED ABDOMINAL LOCATION: Primary | ICD-10-CM

## 2018-01-01 DIAGNOSIS — I10 ESSENTIAL HYPERTENSION: ICD-10-CM

## 2018-01-01 DIAGNOSIS — C50.919 BREAST CA (HCC): ICD-10-CM

## 2018-01-01 DIAGNOSIS — R19.00 PELVIC MASS: Primary | ICD-10-CM

## 2018-01-01 DIAGNOSIS — H25.012 CORTICAL AGE-RELATED CATARACT OF LEFT EYE: Primary | ICD-10-CM

## 2018-01-01 DIAGNOSIS — C50.919 MALIGNANT NEOPLASM OF BREAST (FEMALE) (HCC): ICD-10-CM

## 2018-01-01 DIAGNOSIS — D64.9 ANEMIA, UNSPECIFIED TYPE: ICD-10-CM

## 2018-01-01 DIAGNOSIS — H26.9 CATARACT OF BOTH EYES, UNSPECIFIED CATARACT TYPE: ICD-10-CM

## 2018-01-01 DIAGNOSIS — I87.2 VENOUS (PERIPHERAL) INSUFFICIENCY: ICD-10-CM

## 2018-01-01 DIAGNOSIS — L03.115 CELLULITIS OF RIGHT LOWER EXTREMITY: Primary | ICD-10-CM

## 2018-01-01 DIAGNOSIS — M79.89 SWELLING OF LIMB: ICD-10-CM

## 2018-01-01 DIAGNOSIS — I27.20 PULMONARY HYPERTENSION (HCC): ICD-10-CM

## 2018-01-01 DIAGNOSIS — Z51.89 ENCOUNTER FOR VOCATIONAL THERAPY: ICD-10-CM

## 2018-01-01 DIAGNOSIS — I51.9 MYXEDEMA HEART DISEASE: Primary | ICD-10-CM

## 2018-01-01 DIAGNOSIS — I48.0 PAROXYSMAL ATRIAL FIBRILLATION (HCC): ICD-10-CM

## 2018-01-01 DIAGNOSIS — M10.9 GOUT, UNSPECIFIED CAUSE, UNSPECIFIED CHRONICITY, UNSPECIFIED SITE: ICD-10-CM

## 2018-01-01 DIAGNOSIS — E11.9 DM (DIABETES MELLITUS) (HCC): ICD-10-CM

## 2018-01-01 DIAGNOSIS — I89.0 CHRONIC ACQUIRED LYMPHEDEMA: ICD-10-CM

## 2018-01-01 DIAGNOSIS — K59.00 CONSTIPATION: ICD-10-CM

## 2018-01-01 DIAGNOSIS — E03.9 MYXEDEMA HEART DISEASE: Primary | ICD-10-CM

## 2018-01-01 DIAGNOSIS — J90 PLEURAL EFFUSION: Primary | ICD-10-CM

## 2018-01-01 LAB
ALBUMIN SERPL-MCNC: 2.4 G/DL (ref 3.5–4.8)
ALBUMIN SERPL-MCNC: 2.8 G/DL (ref 3.5–4.8)
ALBUMIN/GLOB SERPL: 0.6 {RATIO} (ref 1–2)
ALBUMIN/GLOB SERPL: 0.6 {RATIO} (ref 1–2)
ALP LIVER SERPL-CCNC: 156 U/L (ref 55–142)
ALP LIVER SERPL-CCNC: 159 U/L (ref 55–142)
ALT SERPL-CCNC: 10 U/L (ref 14–54)
ALT SERPL-CCNC: 25 U/L (ref 14–54)
AMB EXT CREATININE: 1.54 MG/DL
AMB EXT GLUCOSE: 107 MG/DL
AMB EXT HEMOGLOBIN: 12
AMB EXT WBC: 7.8 X10(3)UL
ANION GAP SERPL CALC-SCNC: 10 MMOL/L (ref 0–18)
ANION GAP SERPL CALC-SCNC: 10 MMOL/L (ref 0–18)
ANION GAP SERPL CALC-SCNC: 11 MMOL/L (ref 0–18)
ANION GAP SERPL CALC-SCNC: 8 MMOL/L (ref 0–18)
ANTIBODY SCREEN: NEGATIVE
AST SERPL-CCNC: 17 U/L (ref 15–41)
AST SERPL-CCNC: 22 U/L (ref 15–41)
ATRIAL RATE: 115 BPM
BASOPHILS # BLD AUTO: 0.03 X10(3) UL (ref 0–0.1)
BASOPHILS # BLD AUTO: 0.04 X10(3) UL (ref 0–0.1)
BASOPHILS NFR BLD AUTO: 0.4 %
BASOPHILS NFR BLD AUTO: 0.5 %
BILIRUB SERPL-MCNC: 0.5 MG/DL (ref 0.1–2)
BILIRUB SERPL-MCNC: 0.9 MG/DL (ref 0.1–2)
BILIRUB UR QL STRIP.AUTO: NEGATIVE
BILIRUB UR QL STRIP.AUTO: NEGATIVE
BUN BLD-MCNC: 26 MG/DL (ref 8–20)
BUN BLD-MCNC: 64 MG/DL (ref 8–20)
BUN BLD-MCNC: 91 MG/DL (ref 8–20)
BUN BLD-MCNC: 96 MG/DL (ref 8–20)
BUN/CREAT SERPL: 28.3 (ref 10–20)
BUN/CREAT SERPL: 46.8 (ref 10–20)
BUN/CREAT SERPL: 56.1 (ref 10–20)
BUN/CREAT SERPL: 58.3 (ref 10–20)
CALCIUM BLD-MCNC: 7.9 MG/DL (ref 8.3–10.3)
CALCIUM BLD-MCNC: 8.1 MG/DL (ref 8.3–10.3)
CALCIUM BLD-MCNC: 8.3 MG/DL (ref 8.3–10.3)
CALCIUM BLD-MCNC: 8.7 MG/DL (ref 8.3–10.3)
CHLORIDE SERPL-SCNC: 106 MMOL/L (ref 101–111)
CHLORIDE SERPL-SCNC: 106 MMOL/L (ref 101–111)
CHLORIDE SERPL-SCNC: 113 MMOL/L (ref 101–111)
CHLORIDE SERPL-SCNC: 116 MMOL/L (ref 101–111)
CO2 SERPL-SCNC: 18 MMOL/L (ref 22–32)
CO2 SERPL-SCNC: 20 MMOL/L (ref 22–32)
CO2 SERPL-SCNC: 21 MMOL/L (ref 22–32)
CO2 SERPL-SCNC: 24 MMOL/L (ref 22–32)
COLOR UR AUTO: YELLOW
COLOR UR AUTO: YELLOW
CREAT BLD-MCNC: 0.92 MG/DL (ref 0.55–1.02)
CREAT BLD-MCNC: 1.14 MG/DL (ref 0.55–1.02)
CREAT BLD-MCNC: 1.56 MG/DL (ref 0.55–1.02)
CREAT BLD-MCNC: 2.05 MG/DL (ref 0.55–1.02)
EOSINOPHIL # BLD AUTO: 0.08 X10(3) UL (ref 0–0.3)
EOSINOPHIL # BLD AUTO: 0.1 X10(3) UL (ref 0–0.3)
EOSINOPHIL NFR BLD AUTO: 1 %
EOSINOPHIL NFR BLD AUTO: 1.3 %
ERYTHROCYTE [DISTWIDTH] IN BLOOD BY AUTOMATED COUNT: 17.2 % (ref 11.5–16)
ERYTHROCYTE [DISTWIDTH] IN BLOOD BY AUTOMATED COUNT: 18.3 % (ref 11.5–16)
GLOBULIN PLAS-MCNC: 3.8 G/DL (ref 2.5–4)
GLOBULIN PLAS-MCNC: 4.6 G/DL (ref 2.5–3.7)
GLUCOSE BLD-MCNC: 112 MG/DL (ref 70–99)
GLUCOSE BLD-MCNC: 114 MG/DL (ref 70–99)
GLUCOSE BLD-MCNC: 119 MG/DL (ref 70–99)
GLUCOSE BLD-MCNC: 120 MG/DL (ref 70–99)
GLUCOSE UR STRIP.AUTO-MCNC: NEGATIVE MG/DL
GLUCOSE UR STRIP.AUTO-MCNC: NEGATIVE MG/DL
HCT VFR BLD AUTO: 31.4 % (ref 34–50)
HCT VFR BLD AUTO: 39.1 % (ref 34–50)
HGB BLD-MCNC: 12.3 G/DL (ref 12–16)
HGB BLD-MCNC: 9.8 G/DL (ref 12–16)
HYALINE CASTS #/AREA URNS AUTO: PRESENT /LPF
HYALINE CASTS #/AREA URNS AUTO: PRESENT /LPF
IMMATURE GRANULOCYTE COUNT: 0.04 X10(3) UL (ref 0–1)
IMMATURE GRANULOCYTE COUNT: 0.04 X10(3) UL (ref 0–1)
IMMATURE GRANULOCYTE RATIO %: 0.5 %
IMMATURE GRANULOCYTE RATIO %: 0.5 %
KETONES UR STRIP.AUTO-MCNC: NEGATIVE MG/DL
KETONES UR STRIP.AUTO-MCNC: NEGATIVE MG/DL
LACTIC ACID: 1.4 MMOL/L (ref 0.5–2)
LEUKOCYTE ESTERASE UR QL STRIP.AUTO: NEGATIVE
LIPASE: 53 U/L (ref 73–393)
LIPASE: 55 U/L (ref 73–393)
LYMPHOCYTES # BLD AUTO: 1.48 X10(3) UL (ref 0.9–4)
LYMPHOCYTES # BLD AUTO: 2.08 X10(3) UL (ref 0.9–4)
LYMPHOCYTES NFR BLD AUTO: 18.3 %
LYMPHOCYTES NFR BLD AUTO: 26 %
M PROTEIN MFR SERPL ELPH: 6.2 G/DL (ref 6.1–8.3)
M PROTEIN MFR SERPL ELPH: 7.4 G/DL (ref 6.1–8.3)
MCH RBC QN AUTO: 23.8 PG (ref 27–33.2)
MCH RBC QN AUTO: 24 PG (ref 27–33.2)
MCHC RBC AUTO-ENTMCNC: 31.2 G/DL (ref 31–37)
MCHC RBC AUTO-ENTMCNC: 31.5 G/DL (ref 31–37)
MCV RBC AUTO: 76.4 FL (ref 81–100)
MCV RBC AUTO: 76.4 FL (ref 81–100)
MONOCYTES # BLD AUTO: 0.8 X10(3) UL (ref 0.1–1)
MONOCYTES # BLD AUTO: 0.9 X10(3) UL (ref 0.1–1)
MONOCYTES NFR BLD AUTO: 10 %
MONOCYTES NFR BLD AUTO: 11.1 %
NEUTROPHIL ABS PRELIM: 4.95 X10 (3) UL (ref 1.3–6.7)
NEUTROPHIL ABS PRELIM: 5.54 X10 (3) UL (ref 1.3–6.7)
NEUTROPHILS # BLD AUTO: 4.95 X10(3) UL (ref 1.3–6.7)
NEUTROPHILS # BLD AUTO: 5.54 X10(3) UL (ref 1.3–6.7)
NEUTROPHILS NFR BLD AUTO: 61.8 %
NEUTROPHILS NFR BLD AUTO: 68.6 %
NITRITE UR QL STRIP.AUTO: NEGATIVE
NITRITE UR QL STRIP.AUTO: NEGATIVE
OSMOLALITY SERPL CALC.SUM OF ELEC: 296 MOSM/KG (ref 275–295)
OSMOLALITY SERPL CALC.SUM OF ELEC: 317 MOSM/KG (ref 275–295)
OSMOLALITY SERPL CALC.SUM OF ELEC: 317 MOSM/KG (ref 275–295)
OSMOLALITY SERPL CALC.SUM OF ELEC: 321 MOSM/KG (ref 275–295)
P AXIS: -26 DEGREES
PH UR STRIP.AUTO: 5 [PH] (ref 4.5–8)
PH UR STRIP.AUTO: 5 [PH] (ref 4.5–8)
PLATELET # BLD AUTO: 259 10(3)UL (ref 150–450)
PLATELET # BLD AUTO: 275 10(3)UL (ref 150–450)
POTASSIUM SERPL-SCNC: 3.8 MMOL/L (ref 3.6–5.1)
POTASSIUM SERPL-SCNC: 4.7 MMOL/L (ref 3.6–5.1)
POTASSIUM SERPL-SCNC: 4.8 MMOL/L (ref 3.6–5.1)
POTASSIUM SERPL-SCNC: 5.1 MMOL/L (ref 3.6–5.1)
PROT UR STRIP.AUTO-MCNC: 30 MG/DL
PROT UR STRIP.AUTO-MCNC: NEGATIVE MG/DL
Q-T INTERVAL: 328 MS
QRS DURATION: 144 MS
QTC CALCULATION (BEZET): 446 MS
R AXIS: 42 DEGREES
RBC # BLD AUTO: 4.11 X10(6)UL (ref 3.8–5.1)
RBC # BLD AUTO: 5.12 X10(6)UL (ref 3.8–5.1)
RBC #/AREA URNS AUTO: >10 /HPF
RED CELL DISTRIBUTION WIDTH-SD: 47.6 FL (ref 35.1–46.3)
RED CELL DISTRIBUTION WIDTH-SD: 49.4 FL (ref 35.1–46.3)
RH BLOOD TYPE: NEGATIVE
SODIUM SERPL-SCNC: 138 MMOL/L (ref 136–144)
SODIUM SERPL-SCNC: 140 MMOL/L (ref 136–144)
SODIUM SERPL-SCNC: 141 MMOL/L (ref 136–144)
SODIUM SERPL-SCNC: 144 MMOL/L (ref 136–144)
SP GR UR STRIP.AUTO: 1.01 (ref 1–1.03)
SP GR UR STRIP.AUTO: 1.02 (ref 1–1.03)
T AXIS: -30 DEGREES
UROBILINOGEN UR STRIP.AUTO-MCNC: 2 MG/DL
UROBILINOGEN UR STRIP.AUTO-MCNC: <2 MG/DL
VENTRICULAR RATE: 111 BPM
WBC # BLD AUTO: 8 X10(3) UL (ref 4–13)
WBC # BLD AUTO: 8.1 X10(3) UL (ref 4–13)

## 2018-01-01 PROCEDURE — 99232 SBSQ HOSP IP/OBS MODERATE 35: CPT | Performed by: INTERNAL MEDICINE

## 2018-01-01 PROCEDURE — 0W9930Z DRAINAGE OF RIGHT PLEURAL CAVITY WITH DRAINAGE DEVICE, PERCUTANEOUS APPROACH: ICD-10-PCS | Performed by: RADIOLOGY

## 2018-01-01 PROCEDURE — 32555 ASPIRATE PLEURA W/ IMAGING: CPT | Performed by: INTERNAL MEDICINE

## 2018-01-01 PROCEDURE — 71045 X-RAY EXAM CHEST 1 VIEW: CPT | Performed by: INTERNAL MEDICINE

## 2018-01-01 PROCEDURE — 99232 SBSQ HOSP IP/OBS MODERATE 35: CPT | Performed by: HOSPITALIST

## 2018-01-01 PROCEDURE — 99239 HOSP IP/OBS DSCHRG MGMT >30: CPT | Performed by: INTERNAL MEDICINE

## 2018-01-01 PROCEDURE — 1111F DSCHRG MED/CURRENT MED MERGE: CPT | Performed by: FAMILY MEDICINE

## 2018-01-01 PROCEDURE — 99231 SBSQ HOSP IP/OBS SF/LOW 25: CPT | Performed by: HOSPITALIST

## 2018-01-01 PROCEDURE — 71045 X-RAY EXAM CHEST 1 VIEW: CPT | Performed by: NURSE PRACTITIONER

## 2018-01-01 PROCEDURE — 99223 1ST HOSP IP/OBS HIGH 75: CPT | Performed by: SPECIALIST

## 2018-01-01 PROCEDURE — 93970 EXTREMITY STUDY: CPT | Performed by: INTERNAL MEDICINE

## 2018-01-01 PROCEDURE — 83735 ASSAY OF MAGNESIUM: CPT

## 2018-01-01 PROCEDURE — 99233 SBSQ HOSP IP/OBS HIGH 50: CPT | Performed by: SPECIALIST

## 2018-01-01 PROCEDURE — 86901 BLOOD TYPING SEROLOGIC RH(D): CPT | Performed by: OBSTETRICS & GYNECOLOGY

## 2018-01-01 PROCEDURE — 93306 TTE W/DOPPLER COMPLETE: CPT | Performed by: NURSE PRACTITIONER

## 2018-01-01 PROCEDURE — 99233 SBSQ HOSP IP/OBS HIGH 50: CPT | Performed by: NURSE PRACTITIONER

## 2018-01-01 PROCEDURE — 99233 SBSQ HOSP IP/OBS HIGH 50: CPT | Performed by: HOSPITALIST

## 2018-01-01 PROCEDURE — 80053 COMPREHEN METABOLIC PANEL: CPT | Performed by: EMERGENCY MEDICINE

## 2018-01-01 PROCEDURE — 71250 CT THORAX DX C-: CPT | Performed by: INTERNAL MEDICINE

## 2018-01-01 PROCEDURE — 71045 X-RAY EXAM CHEST 1 VIEW: CPT | Performed by: STUDENT IN AN ORGANIZED HEALTH CARE EDUCATION/TRAINING PROGRAM

## 2018-01-01 PROCEDURE — 30233N1 TRANSFUSION OF NONAUTOLOGOUS RED BLOOD CELLS INTO PERIPHERAL VEIN, PERCUTANEOUS APPROACH: ICD-10-PCS | Performed by: NURSE PRACTITIONER

## 2018-01-01 PROCEDURE — 78452 HT MUSCLE IMAGE SPECT MULT: CPT | Performed by: INTERNAL MEDICINE

## 2018-01-01 PROCEDURE — 0UT70ZZ RESECTION OF BILATERAL FALLOPIAN TUBES, OPEN APPROACH: ICD-10-PCS | Performed by: OBSTETRICS & GYNECOLOGY

## 2018-01-01 PROCEDURE — 96361 HYDRATE IV INFUSION ADD-ON: CPT

## 2018-01-01 PROCEDURE — 99239 HOSP IP/OBS DSCHRG MGMT >30: CPT | Performed by: HOSPITALIST

## 2018-01-01 PROCEDURE — 99231 SBSQ HOSP IP/OBS SF/LOW 25: CPT | Performed by: SPECIALIST

## 2018-01-01 PROCEDURE — 99233 SBSQ HOSP IP/OBS HIGH 50: CPT | Performed by: INTERNAL MEDICINE

## 2018-01-01 PROCEDURE — 3E0L3GC INTRODUCTION OF OTHER THERAPEUTIC SUBSTANCE INTO PLEURAL CAVITY, PERCUTANEOUS APPROACH: ICD-10-PCS | Performed by: NURSE PRACTITIONER

## 2018-01-01 PROCEDURE — 93018 CV STRESS TEST I&R ONLY: CPT | Performed by: INTERNAL MEDICINE

## 2018-01-01 PROCEDURE — 99223 1ST HOSP IP/OBS HIGH 75: CPT | Performed by: INTERNAL MEDICINE

## 2018-01-01 PROCEDURE — 93971 EXTREMITY STUDY: CPT | Performed by: INTERNAL MEDICINE

## 2018-01-01 PROCEDURE — 99232 SBSQ HOSP IP/OBS MODERATE 35: CPT | Performed by: SPECIALIST

## 2018-01-01 PROCEDURE — 71045 X-RAY EXAM CHEST 1 VIEW: CPT | Performed by: EMERGENCY MEDICINE

## 2018-01-01 PROCEDURE — 74176 CT ABD & PELVIS W/O CONTRAST: CPT | Performed by: INTERNAL MEDICINE

## 2018-01-01 PROCEDURE — 49083 ABD PARACENTESIS W/IMAGING: CPT | Performed by: INTERNAL MEDICINE

## 2018-01-01 PROCEDURE — 32557 INSERT CATH PLEURA W/ IMAGE: CPT | Performed by: INTERNAL MEDICINE

## 2018-01-01 PROCEDURE — 71045 X-RAY EXAM CHEST 1 VIEW: CPT | Performed by: RADIOLOGY

## 2018-01-01 PROCEDURE — 0DBU0ZZ EXCISION OF OMENTUM, OPEN APPROACH: ICD-10-PCS | Performed by: OBSTETRICS & GYNECOLOGY

## 2018-01-01 PROCEDURE — B548ZZA ULTRASONOGRAPHY OF SUPERIOR VENA CAVA, GUIDANCE: ICD-10-PCS | Performed by: RADIOLOGY

## 2018-01-01 PROCEDURE — 0UT90ZZ RESECTION OF UTERUS, OPEN APPROACH: ICD-10-PCS | Performed by: OBSTETRICS & GYNECOLOGY

## 2018-01-01 PROCEDURE — 83690 ASSAY OF LIPASE: CPT | Performed by: EMERGENCY MEDICINE

## 2018-01-01 PROCEDURE — B547ZZA ULTRASONOGRAPHY OF LEFT SUBCLAVIAN VEIN, GUIDANCE: ICD-10-PCS | Performed by: RADIOLOGY

## 2018-01-01 PROCEDURE — 99232 SBSQ HOSP IP/OBS MODERATE 35: CPT | Performed by: NURSE PRACTITIONER

## 2018-01-01 PROCEDURE — 99152 MOD SED SAME PHYS/QHP 5/>YRS: CPT | Performed by: INTERNAL MEDICINE

## 2018-01-01 PROCEDURE — 85025 COMPLETE CBC W/AUTO DIFF WBC: CPT

## 2018-01-01 PROCEDURE — 0UT20ZZ RESECTION OF BILATERAL OVARIES, OPEN APPROACH: ICD-10-PCS | Performed by: OBSTETRICS & GYNECOLOGY

## 2018-01-01 PROCEDURE — 5A09357 ASSISTANCE WITH RESPIRATORY VENTILATION, LESS THAN 24 CONSECUTIVE HOURS, CONTINUOUS POSITIVE AIRWAY PRESSURE: ICD-10-PCS | Performed by: HOSPITALIST

## 2018-01-01 PROCEDURE — B519ZZZ FLUOROSCOPY OF INFERIOR VENA CAVA: ICD-10-PCS | Performed by: RADIOLOGY

## 2018-01-01 PROCEDURE — 02HV33Z INSERTION OF INFUSION DEVICE INTO SUPERIOR VENA CAVA, PERCUTANEOUS APPROACH: ICD-10-PCS | Performed by: RADIOLOGY

## 2018-01-01 PROCEDURE — 99285 EMERGENCY DEPT VISIT HI MDM: CPT

## 2018-01-01 PROCEDURE — 99223 1ST HOSP IP/OBS HIGH 75: CPT | Performed by: STUDENT IN AN ORGANIZED HEALTH CARE EDUCATION/TRAINING PROGRAM

## 2018-01-01 PROCEDURE — 86850 RBC ANTIBODY SCREEN: CPT | Performed by: OBSTETRICS & GYNECOLOGY

## 2018-01-01 PROCEDURE — 3E033XZ INTRODUCTION OF VASOPRESSOR INTO PERIPHERAL VEIN, PERCUTANEOUS APPROACH: ICD-10-PCS | Performed by: INTERNAL MEDICINE

## 2018-01-01 PROCEDURE — 99291 CRITICAL CARE FIRST HOUR: CPT | Performed by: NURSE PRACTITIONER

## 2018-01-01 PROCEDURE — 99231 SBSQ HOSP IP/OBS SF/LOW 25: CPT | Performed by: CLINICAL NURSE SPECIALIST

## 2018-01-01 PROCEDURE — 32551 INSERTION OF CHEST TUBE: CPT | Performed by: INTERNAL MEDICINE

## 2018-01-01 PROCEDURE — 76705 ECHO EXAM OF ABDOMEN: CPT | Performed by: INTERNAL MEDICINE

## 2018-01-01 PROCEDURE — 30233N1 TRANSFUSION OF NONAUTOLOGOUS RED BLOOD CELLS INTO PERIPHERAL VEIN, PERCUTANEOUS APPROACH: ICD-10-PCS | Performed by: OBSTETRICS & GYNECOLOGY

## 2018-01-01 PROCEDURE — 74177 CT ABD & PELVIS W/CONTRAST: CPT | Performed by: INTERNAL MEDICINE

## 2018-01-01 PROCEDURE — 99495 TRANSJ CARE MGMT MOD F2F 14D: CPT | Performed by: FAMILY MEDICINE

## 2018-01-01 PROCEDURE — 0DBB0ZZ EXCISION OF ILEUM, OPEN APPROACH: ICD-10-PCS | Performed by: OBSTETRICS & GYNECOLOGY

## 2018-01-01 PROCEDURE — 82306 VITAMIN D 25 HYDROXY: CPT

## 2018-01-01 PROCEDURE — 99496 TRANSJ CARE MGMT HIGH F2F 7D: CPT | Performed by: FAMILY MEDICINE

## 2018-01-01 PROCEDURE — 87086 URINE CULTURE/COLONY COUNT: CPT | Performed by: EMERGENCY MEDICINE

## 2018-01-01 PROCEDURE — 93970 EXTREMITY STUDY: CPT | Performed by: NURSE PRACTITIONER

## 2018-01-01 PROCEDURE — 3E04305 INTRODUCTION OF OTHER ANTINEOPLASTIC INTO CENTRAL VEIN, PERCUTANEOUS APPROACH: ICD-10-PCS | Performed by: NURSE PRACTITIONER

## 2018-01-01 PROCEDURE — 99221 1ST HOSP IP/OBS SF/LOW 40: CPT | Performed by: CLINICAL NURSE SPECIALIST

## 2018-01-01 PROCEDURE — 99306 1ST NF CARE HIGH MDM 50: CPT | Performed by: FAMILY MEDICINE

## 2018-01-01 PROCEDURE — 74176 CT ABD & PELVIS W/O CONTRAST: CPT | Performed by: EMERGENCY MEDICINE

## 2018-01-01 PROCEDURE — 06H03DZ INSERTION OF INTRALUMINAL DEVICE INTO INFERIOR VENA CAVA, PERCUTANEOUS APPROACH: ICD-10-PCS | Performed by: RADIOLOGY

## 2018-01-01 PROCEDURE — 05H633Z INSERTION OF INFUSION DEVICE INTO LEFT SUBCLAVIAN VEIN, PERCUTANEOUS APPROACH: ICD-10-PCS | Performed by: RADIOLOGY

## 2018-01-01 PROCEDURE — 85025 COMPLETE CBC W/AUTO DIFF WBC: CPT | Performed by: EMERGENCY MEDICINE

## 2018-01-01 PROCEDURE — 81001 URINALYSIS AUTO W/SCOPE: CPT | Performed by: EMERGENCY MEDICINE

## 2018-01-01 PROCEDURE — 96374 THER/PROPH/DIAG INJ IV PUSH: CPT

## 2018-01-01 PROCEDURE — 83605 ASSAY OF LACTIC ACID: CPT | Performed by: EMERGENCY MEDICINE

## 2018-01-01 PROCEDURE — 93017 CV STRESS TEST TRACING ONLY: CPT | Performed by: INTERNAL MEDICINE

## 2018-01-01 PROCEDURE — 71260 CT THORAX DX C+: CPT | Performed by: INTERNAL MEDICINE

## 2018-01-01 PROCEDURE — 71275 CT ANGIOGRAPHY CHEST: CPT | Performed by: INTERNAL MEDICINE

## 2018-01-01 PROCEDURE — 99223 1ST HOSP IP/OBS HIGH 75: CPT | Performed by: HOSPITALIST

## 2018-01-01 PROCEDURE — 71045 X-RAY EXAM CHEST 1 VIEW: CPT | Performed by: HOSPITALIST

## 2018-01-01 PROCEDURE — 0W9G3ZZ DRAINAGE OF PERITONEAL CAVITY, PERCUTANEOUS APPROACH: ICD-10-PCS | Performed by: RADIOLOGY

## 2018-01-01 PROCEDURE — 49083 ABD PARACENTESIS W/IMAGING: CPT | Performed by: NURSE PRACTITIONER

## 2018-01-01 PROCEDURE — 80053 COMPREHEN METABOLIC PANEL: CPT

## 2018-01-01 PROCEDURE — 86900 BLOOD TYPING SEROLOGIC ABO: CPT | Performed by: OBSTETRICS & GYNECOLOGY

## 2018-01-01 RX ORDER — DIPHENHYDRAMINE HYDROCHLORIDE 50 MG/ML
50 INJECTION INTRAMUSCULAR; INTRAVENOUS ONCE
Status: DISCONTINUED | OUTPATIENT
Start: 2018-01-01 | End: 2018-01-01

## 2018-01-01 RX ORDER — PANTOPRAZOLE SODIUM 40 MG/1
40 TABLET, DELAYED RELEASE ORAL EVERY 24 HOURS
Status: DISCONTINUED | OUTPATIENT
Start: 2018-01-01 | End: 2018-01-01

## 2018-01-01 RX ORDER — FUROSEMIDE 20 MG/1
20 TABLET ORAL 2 TIMES DAILY
Status: ON HOLD | COMMUNITY
End: 2018-01-01

## 2018-01-01 RX ORDER — HEPARIN SODIUM AND DEXTROSE 10000; 5 [USP'U]/100ML; G/100ML
18 INJECTION INTRAVENOUS ONCE
Status: COMPLETED | OUTPATIENT
Start: 2018-01-01 | End: 2018-01-01

## 2018-01-01 RX ORDER — ACETAMINOPHEN 500 MG
1000 TABLET ORAL ONCE
Status: CANCELLED | OUTPATIENT
Start: 2018-01-01 | End: 2018-01-01

## 2018-01-01 RX ORDER — HALOPERIDOL 1 MG/1
1 TABLET ORAL
Status: DISCONTINUED | OUTPATIENT
Start: 2018-01-01 | End: 2018-01-01

## 2018-01-01 RX ORDER — FUROSEMIDE 10 MG/ML
40 INJECTION INTRAMUSCULAR; INTRAVENOUS ONCE
Status: DISCONTINUED | OUTPATIENT
Start: 2018-01-01 | End: 2018-01-01

## 2018-01-01 RX ORDER — ONDANSETRON 2 MG/ML
4 INJECTION INTRAMUSCULAR; INTRAVENOUS EVERY 6 HOURS PRN
Status: DISCONTINUED | OUTPATIENT
Start: 2018-01-01 | End: 2018-01-01

## 2018-01-01 RX ORDER — MORPHINE SULFATE 4 MG/ML
1 INJECTION, SOLUTION INTRAMUSCULAR; INTRAVENOUS EVERY 2 HOUR PRN
Status: DISCONTINUED | OUTPATIENT
Start: 2018-01-01 | End: 2018-01-01

## 2018-01-01 RX ORDER — FUROSEMIDE 20 MG/1
20 TABLET ORAL
Status: DISCONTINUED | OUTPATIENT
Start: 2018-01-01 | End: 2018-01-01

## 2018-01-01 RX ORDER — SODIUM CHLORIDE 9 MG/ML
INJECTION, SOLUTION INTRAVENOUS ONCE
Status: COMPLETED | OUTPATIENT
Start: 2018-01-01 | End: 2018-01-01

## 2018-01-01 RX ORDER — DEXTROSE MONOHYDRATE 25 G/50ML
50 INJECTION, SOLUTION INTRAVENOUS
Status: DISCONTINUED | OUTPATIENT
Start: 2018-01-01 | End: 2018-01-01

## 2018-01-01 RX ORDER — HYDROCODONE BITARTRATE AND ACETAMINOPHEN 5; 325 MG/1; MG/1
1 TABLET ORAL EVERY 6 HOURS PRN
Status: ON HOLD | COMMUNITY
End: 2018-01-01

## 2018-01-01 RX ORDER — PREDNISONE 20 MG/1
TABLET ORAL
COMMUNITY
Start: 2018-01-01 | End: 2018-01-01

## 2018-01-01 RX ORDER — METHYLPREDNISOLONE SODIUM SUCCINATE 40 MG/ML
32 INJECTION, POWDER, LYOPHILIZED, FOR SOLUTION INTRAMUSCULAR; INTRAVENOUS EVERY 12 HOURS
Status: DISCONTINUED | OUTPATIENT
Start: 2018-01-01 | End: 2018-01-01

## 2018-01-01 RX ORDER — KETOROLAC TROMETHAMINE 5 MG/ML
1 SOLUTION OPHTHALMIC 4 TIMES DAILY
Status: DISCONTINUED | OUTPATIENT
Start: 2018-01-01 | End: 2018-01-01

## 2018-01-01 RX ORDER — MAGNESIUM OXIDE 400 MG (241.3 MG MAGNESIUM) TABLET
400 TABLET ONCE
Status: COMPLETED | OUTPATIENT
Start: 2018-01-01 | End: 2018-01-01

## 2018-01-01 RX ORDER — METHYLPREDNISOLONE SODIUM SUCCINATE 125 MG/2ML
60 INJECTION, POWDER, LYOPHILIZED, FOR SOLUTION INTRAMUSCULAR; INTRAVENOUS EVERY 12 HOURS
Status: DISCONTINUED | OUTPATIENT
Start: 2018-01-01 | End: 2018-01-01

## 2018-01-01 RX ORDER — POTASSIUM CHLORIDE 20 MEQ/1
40 TABLET, EXTENDED RELEASE ORAL ONCE
Status: COMPLETED | OUTPATIENT
Start: 2018-01-01 | End: 2018-01-01

## 2018-01-01 RX ORDER — ZOLPIDEM TARTRATE 5 MG/1
5 TABLET ORAL NIGHTLY PRN
Status: DISCONTINUED | OUTPATIENT
Start: 2018-01-01 | End: 2018-01-01

## 2018-01-01 RX ORDER — FUROSEMIDE 20 MG/1
20 TABLET ORAL DAILY
Status: DISCONTINUED | OUTPATIENT
Start: 2018-01-01 | End: 2018-01-01

## 2018-01-01 RX ORDER — MIDAZOLAM HYDROCHLORIDE 1 MG/ML
INJECTION INTRAMUSCULAR; INTRAVENOUS
Status: COMPLETED
Start: 2018-01-01 | End: 2018-01-01

## 2018-01-01 RX ORDER — ACETAMINOPHEN 325 MG/1
650 TABLET ORAL ONCE
Status: DISCONTINUED | OUTPATIENT
Start: 2018-01-01 | End: 2018-01-01

## 2018-01-01 RX ORDER — BISACODYL 10 MG
10 SUPPOSITORY, RECTAL RECTAL
Status: DISCONTINUED | OUTPATIENT
Start: 2018-01-01 | End: 2018-01-01

## 2018-01-01 RX ORDER — METOPROLOL TARTRATE 50 MG/1
50 TABLET, FILM COATED ORAL 3 TIMES DAILY
Status: DISCONTINUED | OUTPATIENT
Start: 2018-01-01 | End: 2018-01-01

## 2018-01-01 RX ORDER — ATORVASTATIN CALCIUM 10 MG/1
TABLET, FILM COATED ORAL
Qty: 90 TABLET | Refills: 1 | Status: SHIPPED | OUTPATIENT
Start: 2018-01-01 | End: 2018-01-01

## 2018-01-01 RX ORDER — ASPIRIN 81 MG/1
81 TABLET ORAL DAILY
Status: ON HOLD | COMMUNITY
End: 2018-01-01

## 2018-01-01 RX ORDER — MAGNESIUM HYDROXIDE/ALUMINUM HYDROXICE/SIMETHICONE 120; 1200; 1200 MG/30ML; MG/30ML; MG/30ML
30 SUSPENSION ORAL EVERY 6 HOURS PRN
Status: ON HOLD | COMMUNITY
End: 2018-01-01

## 2018-01-01 RX ORDER — DILTIAZEM HYDROCHLORIDE 5 MG/ML
5 INJECTION INTRAVENOUS ONCE
Status: COMPLETED | OUTPATIENT
Start: 2018-01-01 | End: 2018-01-01

## 2018-01-01 RX ORDER — FUROSEMIDE 10 MG/ML
40 INJECTION INTRAMUSCULAR; INTRAVENOUS DAILY
Status: COMPLETED | OUTPATIENT
Start: 2018-01-01 | End: 2018-01-01

## 2018-01-01 RX ORDER — SODIUM CHLORIDE 9 MG/ML
INJECTION, SOLUTION INTRAVENOUS ONCE
Status: DISCONTINUED | OUTPATIENT
Start: 2018-01-01 | End: 2018-01-01

## 2018-01-01 RX ORDER — ACETAMINOPHEN 325 MG/1
650 TABLET ORAL ONCE
Status: COMPLETED | OUTPATIENT
Start: 2018-01-01 | End: 2018-01-01

## 2018-01-01 RX ORDER — METHYLPREDNISOLONE SODIUM SUCCINATE 40 MG/ML
40 INJECTION, POWDER, LYOPHILIZED, FOR SOLUTION INTRAMUSCULAR; INTRAVENOUS EVERY 6 HOURS
Status: DISCONTINUED | OUTPATIENT
Start: 2018-01-01 | End: 2018-01-01

## 2018-01-01 RX ORDER — NALOXONE HYDROCHLORIDE 0.4 MG/ML
80 INJECTION, SOLUTION INTRAMUSCULAR; INTRAVENOUS; SUBCUTANEOUS AS NEEDED
Status: DISCONTINUED | OUTPATIENT
Start: 2018-01-01 | End: 2018-01-01

## 2018-01-01 RX ORDER — ACETAMINOPHEN 325 MG/1
650 TABLET ORAL EVERY 6 HOURS PRN
Status: DISCONTINUED | OUTPATIENT
Start: 2018-01-01 | End: 2018-01-01

## 2018-01-01 RX ORDER — IPRATROPIUM BROMIDE AND ALBUTEROL SULFATE 2.5; .5 MG/3ML; MG/3ML
3 SOLUTION RESPIRATORY (INHALATION)
Qty: 120 VIAL | Refills: 1 | Status: SHIPPED | OUTPATIENT
Start: 2018-01-01 | End: 2018-01-01

## 2018-01-01 RX ORDER — COLCHICINE 0.6 MG/1
0.6 TABLET ORAL DAILY
Status: ON HOLD | COMMUNITY
End: 2018-01-01

## 2018-01-01 RX ORDER — PEAK FLOW METER
EACH MISCELLANEOUS
Refills: 0 | COMMUNITY
Start: 2018-01-01 | End: 2018-01-01

## 2018-01-01 RX ORDER — LEVOTHYROXINE SODIUM 0.03 MG/1
25 TABLET ORAL
Status: DISCONTINUED | OUTPATIENT
Start: 2018-01-01 | End: 2018-01-01 | Stop reason: SDUPTHER

## 2018-01-01 RX ORDER — LORAZEPAM 2 MG/ML
0.5 INJECTION INTRAMUSCULAR EVERY 4 HOURS PRN
Status: DISCONTINUED | OUTPATIENT
Start: 2018-01-01 | End: 2018-01-01

## 2018-01-01 RX ORDER — POTASSIUM CHLORIDE 20 MEQ/1
40 TABLET, EXTENDED RELEASE ORAL EVERY 4 HOURS
Status: COMPLETED | OUTPATIENT
Start: 2018-01-01 | End: 2018-01-01

## 2018-01-01 RX ORDER — FUROSEMIDE 10 MG/ML
20 INJECTION INTRAMUSCULAR; INTRAVENOUS ONCE
Status: COMPLETED | OUTPATIENT
Start: 2018-01-01 | End: 2018-01-01

## 2018-01-01 RX ORDER — HALOPERIDOL 5 MG/ML
2 INJECTION INTRAMUSCULAR
Status: DISCONTINUED | OUTPATIENT
Start: 2018-01-01 | End: 2018-01-01

## 2018-01-01 RX ORDER — ATORVASTATIN CALCIUM 10 MG/1
10 TABLET, FILM COATED ORAL NIGHTLY
COMMUNITY
End: 2018-01-01

## 2018-01-01 RX ORDER — INSULIN ASPART 100 [IU]/ML
INJECTION, SOLUTION INTRAVENOUS; SUBCUTANEOUS ONCE
Status: DISCONTINUED | OUTPATIENT
Start: 2018-01-01 | End: 2018-01-01 | Stop reason: HOSPADM

## 2018-01-01 RX ORDER — ALBUMIN, HUMAN INJ 5% 5 %
250 SOLUTION INTRAVENOUS ONCE
Status: COMPLETED | OUTPATIENT
Start: 2018-01-01 | End: 2018-01-01

## 2018-01-01 RX ORDER — ALBUMIN (HUMAN) 12.5 G/50ML
25 SOLUTION INTRAVENOUS EVERY 6 HOURS
Status: COMPLETED | OUTPATIENT
Start: 2018-01-01 | End: 2018-01-01

## 2018-01-01 RX ORDER — POTASSIUM CHLORIDE 20 MEQ/1
40 TABLET, EXTENDED RELEASE ORAL ONCE
Status: DISCONTINUED | OUTPATIENT
Start: 2018-01-01 | End: 2018-01-01

## 2018-01-01 RX ORDER — LORATADINE 10 MG/1
10 TABLET ORAL DAILY PRN
Status: ON HOLD | COMMUNITY
End: 2018-01-01

## 2018-01-01 RX ORDER — FUROSEMIDE 10 MG/ML
40 INJECTION INTRAMUSCULAR; INTRAVENOUS ONCE
Status: COMPLETED | OUTPATIENT
Start: 2018-01-01 | End: 2018-01-01

## 2018-01-01 RX ORDER — SCOLOPAMINE TRANSDERMAL SYSTEM 1 MG/1
1 PATCH, EXTENDED RELEASE TRANSDERMAL
Status: CANCELLED | OUTPATIENT
Start: 2018-11-12

## 2018-01-01 RX ORDER — ONDANSETRON 2 MG/ML
4 INJECTION INTRAMUSCULAR; INTRAVENOUS ONCE
Status: COMPLETED | OUTPATIENT
Start: 2018-01-01 | End: 2018-01-01

## 2018-01-01 RX ORDER — ALBUMIN (HUMAN) 12.5 G/50ML
25 SOLUTION INTRAVENOUS ONCE
Status: COMPLETED | OUTPATIENT
Start: 2018-01-01 | End: 2018-01-01

## 2018-01-01 RX ORDER — ALBUMIN (HUMAN) 12.5 G/50ML
50 SOLUTION INTRAVENOUS ONCE
Status: COMPLETED | OUTPATIENT
Start: 2018-01-01 | End: 2018-01-01

## 2018-01-01 RX ORDER — FUROSEMIDE 10 MG/ML
20 INJECTION INTRAMUSCULAR; INTRAVENOUS ONCE
Status: DISCONTINUED | OUTPATIENT
Start: 2018-01-01 | End: 2018-01-01

## 2018-01-01 RX ORDER — METRONIDAZOLE 500 MG/100ML
500 INJECTION, SOLUTION INTRAVENOUS EVERY 8 HOURS
Status: DISCONTINUED | OUTPATIENT
Start: 2018-01-01 | End: 2018-01-01

## 2018-01-01 RX ORDER — LORAZEPAM 1 MG/1
2 TABLET ORAL EVERY 4 HOURS PRN
Status: DISCONTINUED | OUTPATIENT
Start: 2018-01-01 | End: 2018-01-01

## 2018-01-01 RX ORDER — PROCHLORPERAZINE MALEATE 10 MG
10 TABLET ORAL EVERY 12 HOURS PRN
Status: DISCONTINUED | OUTPATIENT
Start: 2018-01-01 | End: 2018-01-01

## 2018-01-01 RX ORDER — LEVOTHYROXINE SODIUM 0.2 MG/1
TABLET ORAL SEE ADMIN INSTRUCTIONS
Status: ON HOLD | COMMUNITY
End: 2018-01-01

## 2018-01-01 RX ORDER — LIDOCAINE HYDROCHLORIDE 20 MG/ML
20 INJECTION, SOLUTION INFILTRATION; PERINEURAL ONCE
Status: COMPLETED | OUTPATIENT
Start: 2018-01-01 | End: 2018-01-01

## 2018-01-01 RX ORDER — POTASSIUM CHLORIDE 14.9 MG/ML
20 INJECTION INTRAVENOUS ONCE
Status: COMPLETED | OUTPATIENT
Start: 2018-01-01 | End: 2018-01-01

## 2018-01-01 RX ORDER — PREDNISONE 50 MG/1
50 TABLET ORAL EVERY 6 HOURS
Status: COMPLETED | OUTPATIENT
Start: 2018-01-01 | End: 2018-01-01

## 2018-01-01 RX ORDER — POTASSIUM CHLORIDE 20 MEQ/1
40 TABLET, EXTENDED RELEASE ORAL DAILY
Status: COMPLETED | OUTPATIENT
Start: 2018-01-01 | End: 2018-01-01

## 2018-01-01 RX ORDER — CYANOCOBALAMIN 1000 UG/ML
1000 INJECTION INTRAMUSCULAR; SUBCUTANEOUS DAILY
Status: DISCONTINUED | OUTPATIENT
Start: 2018-01-01 | End: 2018-01-01

## 2018-01-01 RX ORDER — LIDOCAINE HYDROCHLORIDE 10 MG/ML
INJECTION, SOLUTION INFILTRATION; PERINEURAL
Status: COMPLETED
Start: 2018-01-01 | End: 2018-01-01

## 2018-01-01 RX ORDER — LEVOTHYROXINE SODIUM 0.1 MG/1
200 TABLET ORAL
Status: DISCONTINUED | OUTPATIENT
Start: 2018-01-01 | End: 2018-01-01 | Stop reason: SDUPTHER

## 2018-01-01 RX ORDER — ATORVASTATIN CALCIUM 10 MG/1
10 TABLET, FILM COATED ORAL NIGHTLY
Status: DISCONTINUED | OUTPATIENT
Start: 2018-01-01 | End: 2018-01-01

## 2018-01-01 RX ORDER — CEFAZOLIN SODIUM/WATER 2 G/20 ML
2 SYRINGE (ML) INTRAVENOUS EVERY 8 HOURS
Status: COMPLETED | OUTPATIENT
Start: 2018-01-01 | End: 2018-01-01

## 2018-01-01 RX ORDER — METOPROLOL TARTRATE 50 MG/1
50 TABLET, FILM COATED ORAL 3 TIMES DAILY
Qty: 270 TABLET | Refills: 3 | Status: ON HOLD | OUTPATIENT
Start: 2018-01-01 | End: 2018-01-01

## 2018-01-01 RX ORDER — DEXAMETHASONE SODIUM PHOSPHATE 4 MG/ML
10 VIAL (ML) INJECTION ONCE
Status: COMPLETED | OUTPATIENT
Start: 2018-01-01 | End: 2018-01-01

## 2018-01-01 RX ORDER — LEVOFLOXACIN 500 MG/1
500 TABLET, FILM COATED ORAL DAILY
Qty: 7 TABLET | Refills: 0 | Status: ON HOLD | OUTPATIENT
Start: 2018-01-01 | End: 2018-01-01

## 2018-01-01 RX ORDER — MORPHINE SULFATE 4 MG/ML
4 INJECTION, SOLUTION INTRAMUSCULAR; INTRAVENOUS ONCE
Status: COMPLETED | OUTPATIENT
Start: 2018-01-01 | End: 2018-01-01

## 2018-01-01 RX ORDER — SPIRONOLACTONE 25 MG/1
25 TABLET ORAL DAILY
Status: DISCONTINUED | OUTPATIENT
Start: 2018-01-01 | End: 2018-01-01

## 2018-01-01 RX ORDER — ACETAMINOPHEN 325 MG/1
650 TABLET ORAL EVERY 6 HOURS PRN
Status: ON HOLD | COMMUNITY
End: 2018-01-01

## 2018-01-01 RX ORDER — ENOXAPARIN SODIUM 100 MG/ML
0.5 INJECTION SUBCUTANEOUS DAILY
Status: DISCONTINUED | OUTPATIENT
Start: 2018-01-01 | End: 2018-01-01

## 2018-01-01 RX ORDER — DEXTROSE MONOHYDRATE 25 G/50ML
50 INJECTION, SOLUTION INTRAVENOUS
Status: DISCONTINUED | OUTPATIENT
Start: 2018-01-01 | End: 2018-01-01 | Stop reason: HOSPADM

## 2018-01-01 RX ORDER — MAGNESIUM OXIDE 400 MG (241.3 MG MAGNESIUM) TABLET
800 TABLET ONCE
Status: COMPLETED | OUTPATIENT
Start: 2018-01-01 | End: 2018-01-01

## 2018-01-01 RX ORDER — SODIUM CHLORIDE 9 MG/ML
125 INJECTION, SOLUTION INTRAVENOUS CONTINUOUS
Status: DISCONTINUED | OUTPATIENT
Start: 2018-01-01 | End: 2018-01-01

## 2018-01-01 RX ORDER — HYDROCODONE BITARTRATE AND ACETAMINOPHEN 5; 325 MG/1; MG/1
1 TABLET ORAL EVERY 4 HOURS PRN
Status: DISCONTINUED | OUTPATIENT
Start: 2018-01-01 | End: 2018-01-01

## 2018-01-01 RX ORDER — HYDROMORPHONE HYDROCHLORIDE 1 MG/ML
0.2 INJECTION, SOLUTION INTRAMUSCULAR; INTRAVENOUS; SUBCUTANEOUS EVERY 2 HOUR PRN
Status: DISCONTINUED | OUTPATIENT
Start: 2018-01-01 | End: 2018-01-01

## 2018-01-01 RX ORDER — SPIRONOLACTONE 25 MG/1
TABLET ORAL
Qty: 120 TABLET | Refills: 1 | Status: SHIPPED | OUTPATIENT
Start: 2018-01-01 | End: 2018-01-01

## 2018-01-01 RX ORDER — ACETAMINOPHEN 325 MG/1
650 TABLET ORAL EVERY 4 HOURS PRN
Status: DISCONTINUED | OUTPATIENT
Start: 2018-01-01 | End: 2018-01-01

## 2018-01-01 RX ORDER — COLCHICINE 0.6 MG/1
TABLET ORAL
Qty: 90 TABLET | Refills: 1 | Status: SHIPPED | OUTPATIENT
Start: 2018-01-01 | End: 2018-01-01

## 2018-01-01 RX ORDER — RIVAROXABAN 20 MG/1
TABLET, FILM COATED ORAL
Qty: 90 TABLET | Refills: 1 | Status: SHIPPED | OUTPATIENT
Start: 2018-01-01 | End: 2018-01-01

## 2018-01-01 RX ORDER — DIPHENHYDRAMINE HCL 50 MG
50 CAPSULE ORAL ONCE
Status: COMPLETED | OUTPATIENT
Start: 2018-01-01 | End: 2018-01-01

## 2018-01-01 RX ORDER — LEVOTHYROXINE SODIUM 0.2 MG/1
200 TABLET ORAL
Status: DISCONTINUED | OUTPATIENT
Start: 2018-01-01 | End: 2018-01-01 | Stop reason: SDUPTHER

## 2018-01-01 RX ORDER — PREDNISONE 20 MG/1
TABLET ORAL
Qty: 21 TABLET | Refills: 0 | Status: SHIPPED | OUTPATIENT
Start: 2018-01-01 | End: 2018-01-01 | Stop reason: ALTCHOICE

## 2018-01-01 RX ORDER — LOPERAMIDE HYDROCHLORIDE 2 MG/1
2 CAPSULE ORAL 4 TIMES DAILY PRN
Status: DISCONTINUED | OUTPATIENT
Start: 2018-01-01 | End: 2018-01-01

## 2018-01-01 RX ORDER — MORPHINE SULFATE 4 MG/ML
1 INJECTION, SOLUTION INTRAMUSCULAR; INTRAVENOUS EVERY 12 HOURS PRN
Status: DISCONTINUED | OUTPATIENT
Start: 2018-01-01 | End: 2018-01-01

## 2018-01-01 RX ORDER — MAGNESIUM SULFATE HEPTAHYDRATE 40 MG/ML
2 INJECTION, SOLUTION INTRAVENOUS ONCE
Status: COMPLETED | OUTPATIENT
Start: 2018-01-01 | End: 2018-01-01

## 2018-01-01 RX ORDER — LORAZEPAM 0.5 MG/1
0.5 TABLET ORAL EVERY 4 HOURS PRN
Status: DISCONTINUED | OUTPATIENT
Start: 2018-01-01 | End: 2018-01-01

## 2018-01-01 RX ORDER — MELATONIN
325
Status: ON HOLD | COMMUNITY
End: 2018-01-01

## 2018-01-01 RX ORDER — LEVOTHYROXINE SODIUM 0.03 MG/1
TABLET ORAL
Qty: 90 TABLET | Refills: 0 | Status: SHIPPED | OUTPATIENT
Start: 2018-01-01 | End: 2018-01-01

## 2018-01-01 RX ORDER — ALBUMIN (HUMAN) 12.5 G/50ML
25 SOLUTION INTRAVENOUS EVERY 8 HOURS
Status: DISCONTINUED | OUTPATIENT
Start: 2018-01-01 | End: 2018-01-01

## 2018-01-01 RX ORDER — NITROFURANTOIN 25; 75 MG/1; MG/1
100 CAPSULE ORAL 2 TIMES DAILY
Qty: 20 CAPSULE | Refills: 0 | Status: SHIPPED | OUTPATIENT
Start: 2018-01-01 | End: 2018-01-01

## 2018-01-01 RX ORDER — ALBUMIN (HUMAN) 12.5 G/50ML
25 SOLUTION INTRAVENOUS EVERY 8 HOURS
Status: COMPLETED | OUTPATIENT
Start: 2018-01-01 | End: 2018-01-01

## 2018-01-01 RX ORDER — COLCHICINE 0.6 MG/1
0.6 TABLET ORAL
Status: DISCONTINUED | OUTPATIENT
Start: 2018-01-01 | End: 2018-01-01

## 2018-01-01 RX ORDER — PREDNISONE 50 MG/1
50 TABLET ORAL EVERY 6 HOURS
Status: DISCONTINUED | OUTPATIENT
Start: 2018-01-01 | End: 2018-01-01 | Stop reason: CLARIF

## 2018-01-01 RX ORDER — ENOXAPARIN SODIUM 100 MG/ML
40 INJECTION SUBCUTANEOUS DAILY
Qty: 2.8 ML | Refills: 0 | Status: SHIPPED | OUTPATIENT
Start: 2018-01-01 | End: 2018-01-01

## 2018-01-01 RX ORDER — POTASSIUM CHLORIDE 20 MEQ/1
40 TABLET, EXTENDED RELEASE ORAL EVERY 4 HOURS
Status: DISCONTINUED | OUTPATIENT
Start: 2018-01-01 | End: 2018-01-01 | Stop reason: SDUPTHER

## 2018-01-01 RX ORDER — MORPHINE SULFATE 4 MG/ML
2 INJECTION, SOLUTION INTRAMUSCULAR; INTRAVENOUS EVERY 2 HOUR PRN
Status: DISCONTINUED | OUTPATIENT
Start: 2018-01-01 | End: 2018-01-01

## 2018-01-01 RX ORDER — ACETAMINOPHEN 650 MG/1
650 SUPPOSITORY RECTAL EVERY 6 HOURS SCHEDULED
Status: DISCONTINUED | OUTPATIENT
Start: 2018-01-01 | End: 2018-01-01

## 2018-01-01 RX ORDER — FUROSEMIDE 10 MG/ML
40 INJECTION INTRAMUSCULAR; INTRAVENOUS DAILY
Status: DISCONTINUED | OUTPATIENT
Start: 2018-01-01 | End: 2018-01-01

## 2018-01-01 RX ORDER — HEPARIN SODIUM 5000 [USP'U]/ML
INJECTION, SOLUTION INTRAVENOUS; SUBCUTANEOUS
Status: COMPLETED
Start: 2018-01-01 | End: 2018-01-01

## 2018-01-01 RX ORDER — SIMETHICONE 80 MG
80 TABLET,CHEWABLE ORAL
Status: DISCONTINUED | OUTPATIENT
Start: 2018-01-01 | End: 2018-01-01

## 2018-01-01 RX ORDER — METOPROLOL TARTRATE 50 MG/1
50 TABLET, FILM COATED ORAL
Status: DISCONTINUED | OUTPATIENT
Start: 2018-01-01 | End: 2018-01-01

## 2018-01-01 RX ORDER — DEXMEDETOMIDINE HYDROCHLORIDE 4 UG/ML
INJECTION, SOLUTION INTRAVENOUS CONTINUOUS
Status: DISCONTINUED | OUTPATIENT
Start: 2018-01-01 | End: 2018-01-01

## 2018-01-01 RX ORDER — METOPROLOL TARTRATE 50 MG/1
TABLET, FILM COATED ORAL
Qty: 180 TABLET | Refills: 0 | Status: SHIPPED | OUTPATIENT
Start: 2018-01-01 | End: 2018-01-01

## 2018-01-01 RX ORDER — SODIUM CHLORIDE 9 MG/ML
INJECTION, SOLUTION INTRAVENOUS CONTINUOUS
Status: ACTIVE | OUTPATIENT
Start: 2018-01-01 | End: 2018-01-01

## 2018-01-01 RX ORDER — METHYLPREDNISOLONE SODIUM SUCCINATE 125 MG/2ML
60 INJECTION, POWDER, LYOPHILIZED, FOR SOLUTION INTRAMUSCULAR; INTRAVENOUS EVERY 8 HOURS
Status: DISCONTINUED | OUTPATIENT
Start: 2018-01-01 | End: 2018-01-01

## 2018-01-01 RX ORDER — ALBUMIN, HUMAN INJ 5% 5 %
500 SOLUTION INTRAVENOUS ONCE
Status: COMPLETED | OUTPATIENT
Start: 2018-01-01 | End: 2018-01-01

## 2018-01-01 RX ORDER — MORPHINE SULFATE 4 MG/ML
1 INJECTION, SOLUTION INTRAMUSCULAR; INTRAVENOUS
Status: DISCONTINUED | OUTPATIENT
Start: 2018-01-01 | End: 2018-01-01

## 2018-01-01 RX ORDER — SPIRONOLACTONE 25 MG/1
TABLET ORAL
Qty: 180 TABLET | Refills: 1 | Status: SHIPPED | OUTPATIENT
Start: 2018-01-01 | End: 2018-01-01 | Stop reason: ALTCHOICE

## 2018-01-01 RX ORDER — DOCUSATE SODIUM 100 MG/1
100 CAPSULE, LIQUID FILLED ORAL DAILY PRN
Status: ON HOLD | COMMUNITY
End: 2018-01-01

## 2018-01-01 RX ORDER — LORAZEPAM 2 MG/ML
0.5 INJECTION INTRAMUSCULAR EVERY 30 MIN PRN
Status: DISCONTINUED | OUTPATIENT
Start: 2018-01-01 | End: 2018-01-01

## 2018-01-01 RX ORDER — MAGNESIUM HYDROXIDE 1200 MG/15ML
30 LIQUID ORAL EVERY 8 HOURS
Status: DISCONTINUED | OUTPATIENT
Start: 2018-01-01 | End: 2018-01-01

## 2018-01-01 RX ORDER — LEVOTHYROXINE SODIUM 0.03 MG/1
25 TABLET ORAL
COMMUNITY
End: 2018-01-01

## 2018-01-01 RX ORDER — SCOLOPAMINE TRANSDERMAL SYSTEM 1 MG/1
1 PATCH, EXTENDED RELEASE TRANSDERMAL
Status: DISCONTINUED | OUTPATIENT
Start: 2018-11-12 | End: 2018-01-01

## 2018-01-01 RX ORDER — LEVOFLOXACIN 5 MG/ML
750 INJECTION, SOLUTION INTRAVENOUS
Status: DISCONTINUED | OUTPATIENT
Start: 2018-01-01 | End: 2018-01-01

## 2018-01-01 RX ORDER — SODIUM CHLORIDE 9 MG/ML
INJECTION, SOLUTION INTRAVENOUS CONTINUOUS
Status: DISCONTINUED | OUTPATIENT
Start: 2018-01-01 | End: 2018-01-01

## 2018-01-01 RX ORDER — DOCUSATE SODIUM 100 MG/1
100 CAPSULE, LIQUID FILLED ORAL 2 TIMES DAILY
Status: DISCONTINUED | OUTPATIENT
Start: 2018-01-01 | End: 2018-01-01

## 2018-01-01 RX ORDER — BISACODYL 10 MG
10 SUPPOSITORY, RECTAL RECTAL
Status: CANCELLED | OUTPATIENT
Start: 2018-01-01

## 2018-01-01 RX ORDER — AMMONIUM LACTATE 12 G/100G
1 LOTION TOPICAL 2 TIMES DAILY PRN
Status: ON HOLD | COMMUNITY
End: 2018-01-01

## 2018-01-01 RX ORDER — GLYCOPYRROLATE 0.2 MG/ML
0.4 INJECTION, SOLUTION INTRAMUSCULAR; INTRAVENOUS
Status: DISCONTINUED | OUTPATIENT
Start: 2018-01-01 | End: 2018-01-01

## 2018-01-01 RX ORDER — SODIUM CHLORIDE, SODIUM LACTATE, POTASSIUM CHLORIDE, CALCIUM CHLORIDE 600; 310; 30; 20 MG/100ML; MG/100ML; MG/100ML; MG/100ML
INJECTION, SOLUTION INTRAVENOUS CONTINUOUS
Status: DISCONTINUED | OUTPATIENT
Start: 2018-01-01 | End: 2018-01-01

## 2018-01-01 RX ORDER — MIDAZOLAM HYDROCHLORIDE 1 MG/ML
1 INJECTION INTRAMUSCULAR; INTRAVENOUS EVERY 5 MIN PRN
Status: ACTIVE | OUTPATIENT
Start: 2018-01-01 | End: 2018-01-01

## 2018-01-01 RX ORDER — IPRATROPIUM BROMIDE AND ALBUTEROL SULFATE 2.5; .5 MG/3ML; MG/3ML
3 SOLUTION RESPIRATORY (INHALATION)
Status: ON HOLD | COMMUNITY
End: 2018-01-01

## 2018-01-01 RX ORDER — ATORVASTATIN CALCIUM 10 MG/1
10 TABLET, FILM COATED ORAL NIGHTLY
Qty: 90 TABLET | Refills: 3 | Status: ON HOLD | OUTPATIENT
Start: 2018-01-01 | End: 2018-01-01

## 2018-01-01 RX ORDER — FUROSEMIDE 10 MG/ML
40 INJECTION INTRAMUSCULAR; INTRAVENOUS
Status: DISCONTINUED | OUTPATIENT
Start: 2018-01-01 | End: 2018-01-01

## 2018-01-01 RX ORDER — HALOPERIDOL 5 MG/ML
1 INJECTION INTRAMUSCULAR
Status: DISCONTINUED | OUTPATIENT
Start: 2018-01-01 | End: 2018-01-01

## 2018-01-01 RX ORDER — SPIRONOLACTONE 25 MG/1
25 TABLET ORAL
Qty: 60 TABLET | Refills: 1 | Status: SHIPPED | OUTPATIENT
Start: 2018-01-01 | End: 2018-01-01

## 2018-01-01 RX ORDER — NALOXONE HYDROCHLORIDE 0.4 MG/ML
80 INJECTION, SOLUTION INTRAMUSCULAR; INTRAVENOUS; SUBCUTANEOUS AS NEEDED
Status: DISCONTINUED | OUTPATIENT
Start: 2018-01-01 | End: 2018-01-01 | Stop reason: HOSPADM

## 2018-01-01 RX ORDER — HEPARIN SODIUM AND DEXTROSE 10000; 5 [USP'U]/100ML; G/100ML
INJECTION INTRAVENOUS CONTINUOUS
Status: DISCONTINUED | OUTPATIENT
Start: 2018-01-01 | End: 2018-01-01

## 2018-01-01 RX ORDER — HYDROCODONE BITARTRATE AND ACETAMINOPHEN 5; 325 MG/1; MG/1
2 TABLET ORAL EVERY 4 HOURS PRN
Status: DISCONTINUED | OUTPATIENT
Start: 2018-01-01 | End: 2018-01-01

## 2018-01-01 RX ORDER — MIDAZOLAM HYDROCHLORIDE 1 MG/ML
1 INJECTION INTRAMUSCULAR; INTRAVENOUS EVERY 5 MIN PRN
Status: DISCONTINUED | OUTPATIENT
Start: 2018-01-01 | End: 2018-01-01

## 2018-01-01 RX ORDER — CHOLECALCIFEROL (VITAMIN D3) 125 MCG
1 CAPSULE ORAL 2 TIMES DAILY
Qty: 14 CAPSULE | Refills: 0 | Status: SHIPPED | OUTPATIENT
Start: 2018-01-01 | End: 2018-01-01

## 2018-01-01 RX ORDER — FUROSEMIDE 10 MG/ML
40 INJECTION INTRAMUSCULAR; INTRAVENOUS
Status: DISPENSED | OUTPATIENT
Start: 2018-01-01 | End: 2018-01-01

## 2018-01-01 RX ORDER — ACETAMINOPHEN 500 MG
1000 TABLET ORAL EVERY 6 HOURS PRN
Status: DISCONTINUED | OUTPATIENT
Start: 2018-01-01 | End: 2018-01-01

## 2018-01-01 RX ORDER — GARLIC EXTRACT 500 MG
1 CAPSULE ORAL DAILY
Status: DISCONTINUED | OUTPATIENT
Start: 2018-01-01 | End: 2018-01-01

## 2018-01-01 RX ORDER — POTASSIUM CHLORIDE 29.8 MG/ML
40 INJECTION INTRAVENOUS ONCE
Status: COMPLETED | OUTPATIENT
Start: 2018-01-01 | End: 2018-01-01

## 2018-01-01 RX ORDER — LEVOTHYROXINE SODIUM 0.2 MG/1
TABLET ORAL
Qty: 90 TABLET | Refills: 1 | Status: SHIPPED | OUTPATIENT
Start: 2018-01-01 | End: 2018-01-01

## 2018-01-01 RX ORDER — LEVOTHYROXINE SODIUM 0.03 MG/1
TABLET ORAL SEE ADMIN INSTRUCTIONS
Status: ON HOLD | COMMUNITY
End: 2018-01-01

## 2018-01-01 RX ORDER — SPIRONOLACTONE 25 MG/1
25 TABLET ORAL
Status: DISCONTINUED | OUTPATIENT
Start: 2018-01-01 | End: 2018-01-01

## 2018-01-01 RX ORDER — DOXORUBICIN HYDROCHLORIDE 2 MG/ML
60 INJECTION, SOLUTION INTRAVENOUS ONCE
Status: COMPLETED | OUTPATIENT
Start: 2018-01-01 | End: 2018-01-01

## 2018-01-01 RX ORDER — HYDRALAZINE HYDROCHLORIDE 25 MG/1
25 TABLET, FILM COATED ORAL 3 TIMES DAILY PRN
Status: ON HOLD | COMMUNITY
End: 2018-01-01

## 2018-01-01 RX ORDER — METOPROLOL TARTRATE 50 MG/1
50 TABLET, FILM COATED ORAL 2 TIMES DAILY
COMMUNITY
End: 2018-01-01

## 2018-01-01 RX ORDER — ONDANSETRON 2 MG/ML
4 INJECTION INTRAMUSCULAR; INTRAVENOUS AS NEEDED
Status: DISCONTINUED | OUTPATIENT
Start: 2018-01-01 | End: 2018-01-01

## 2018-01-01 RX ORDER — METOPROLOL TARTRATE 5 MG/5ML
5 INJECTION INTRAVENOUS EVERY 6 HOURS
Status: DISCONTINUED | OUTPATIENT
Start: 2018-01-01 | End: 2018-01-01

## 2018-01-01 RX ORDER — PREDNISONE 20 MG/1
TABLET ORAL
Qty: 21 TABLET | Refills: 0 | Status: SHIPPED | OUTPATIENT
Start: 2018-01-01 | End: 2018-01-01

## 2018-01-01 RX ORDER — AMLODIPINE BESYLATE 10 MG/1
TABLET ORAL
Qty: 90 TABLET | Refills: 1 | Status: ON HOLD | OUTPATIENT
Start: 2018-01-01 | End: 2018-01-01

## 2018-01-01 RX ORDER — FLUMAZENIL 0.1 MG/ML
0.2 INJECTION, SOLUTION INTRAVENOUS AS NEEDED
Status: DISCONTINUED | OUTPATIENT
Start: 2018-01-01 | End: 2018-01-01 | Stop reason: HOSPADM

## 2018-01-01 RX ORDER — POTASSIUM CHLORIDE 750 MG/1
TABLET, FILM COATED, EXTENDED RELEASE ORAL
Qty: 90 TABLET | Refills: 1 | Status: SHIPPED | OUTPATIENT
Start: 2018-01-01 | End: 2018-01-01

## 2018-01-01 RX ORDER — IPRATROPIUM BROMIDE AND ALBUTEROL SULFATE 2.5; .5 MG/3ML; MG/3ML
3 SOLUTION RESPIRATORY (INHALATION)
Status: DISCONTINUED | OUTPATIENT
Start: 2018-01-01 | End: 2018-01-01

## 2018-01-01 RX ORDER — DIPHENHYDRAMINE HYDROCHLORIDE 50 MG/ML
25 INJECTION INTRAMUSCULAR; INTRAVENOUS EVERY 6 HOURS PRN
Status: DISCONTINUED | OUTPATIENT
Start: 2018-01-01 | End: 2018-01-01

## 2018-01-01 RX ORDER — NYSTATIN 100000 U/G
CREAM TOPICAL
COMMUNITY
Start: 2018-01-01 | End: 2018-01-01

## 2018-01-01 RX ORDER — ONDANSETRON 2 MG/ML
4 INJECTION INTRAMUSCULAR; INTRAVENOUS EVERY 8 HOURS PRN
Status: DISCONTINUED | OUTPATIENT
Start: 2018-01-01 | End: 2018-01-01

## 2018-01-01 RX ORDER — HEPARIN SODIUM 5000 [USP'U]/ML
80 INJECTION INTRAVENOUS; SUBCUTANEOUS ONCE
Status: COMPLETED | OUTPATIENT
Start: 2018-01-01 | End: 2018-01-01

## 2018-01-01 RX ORDER — AMLODIPINE BESYLATE 5 MG/1
TABLET ORAL
COMMUNITY
Start: 2015-09-08 | End: 2018-01-01

## 2018-01-01 RX ORDER — MORPHINE SULFATE 4 MG/ML
2 INJECTION, SOLUTION INTRAMUSCULAR; INTRAVENOUS
Status: CANCELLED | OUTPATIENT
Start: 2018-01-01

## 2018-01-01 RX ORDER — ACETAMINOPHEN 160 MG/5ML
650 SOLUTION ORAL EVERY 6 HOURS SCHEDULED
Status: DISCONTINUED | OUTPATIENT
Start: 2018-01-01 | End: 2018-01-01

## 2018-01-01 RX ORDER — IPRATROPIUM BROMIDE AND ALBUTEROL SULFATE 2.5; .5 MG/3ML; MG/3ML
3 SOLUTION RESPIRATORY (INHALATION) EVERY 2 HOUR PRN
Status: DISCONTINUED | OUTPATIENT
Start: 2018-01-01 | End: 2018-01-01

## 2018-01-01 RX ORDER — ONDANSETRON 4 MG/1
4 TABLET, FILM COATED ORAL EVERY 8 HOURS PRN
Status: DISCONTINUED | OUTPATIENT
Start: 2018-01-01 | End: 2018-01-01

## 2018-01-01 RX ORDER — MORPHINE SULFATE 4 MG/ML
2 INJECTION, SOLUTION INTRAMUSCULAR; INTRAVENOUS EVERY 5 MIN PRN
Status: DISCONTINUED | OUTPATIENT
Start: 2018-01-01 | End: 2018-01-01

## 2018-01-01 RX ORDER — DIPHENHYDRAMINE HYDROCHLORIDE 50 MG/ML
12.5 INJECTION INTRAMUSCULAR; INTRAVENOUS EVERY 4 HOURS PRN
Status: DISCONTINUED | OUTPATIENT
Start: 2018-01-01 | End: 2018-01-01

## 2018-01-01 RX ORDER — AMMONIUM LACTATE 12 G/100G
LOTION TOPICAL AS NEEDED
Status: DISCONTINUED | OUTPATIENT
Start: 2018-01-01 | End: 2018-01-01

## 2018-01-01 RX ORDER — ACETAMINOPHEN 500 MG
1000 TABLET ORAL EVERY 6 HOURS PRN
Status: ON HOLD | COMMUNITY
End: 2018-01-01

## 2018-01-01 RX ORDER — BENAZEPRIL HYDROCHLORIDE 20 MG/1
20 TABLET ORAL 2 TIMES DAILY
Status: ON HOLD | COMMUNITY
End: 2018-01-01

## 2018-01-01 RX ORDER — DIFLUPREDNATE 0.5 MG/ML
EMULSION OPHTHALMIC
COMMUNITY
Start: 2018-01-01 | End: 2018-01-01 | Stop reason: ALTCHOICE

## 2018-01-01 RX ORDER — ALBUMIN (HUMAN) 12.5 G/50ML
25 SOLUTION INTRAVENOUS EVERY 6 HOURS
Status: DISCONTINUED | OUTPATIENT
Start: 2018-01-01 | End: 2018-01-01

## 2018-01-01 RX ORDER — LEVOTHYROXINE SODIUM 0.03 MG/1
TABLET ORAL
Qty: 90 TABLET | Refills: 1 | Status: SHIPPED | OUTPATIENT
Start: 2018-01-01 | End: 2018-01-01

## 2018-01-01 RX ORDER — METOCLOPRAMIDE HYDROCHLORIDE 5 MG/ML
10 INJECTION INTRAMUSCULAR; INTRAVENOUS EVERY 6 HOURS PRN
Status: DISCONTINUED | OUTPATIENT
Start: 2018-01-01 | End: 2018-01-01

## 2018-01-01 RX ORDER — LORAZEPAM 2 MG/ML
2 INJECTION INTRAMUSCULAR EVERY 4 HOURS PRN
Status: DISCONTINUED | OUTPATIENT
Start: 2018-01-01 | End: 2018-01-01

## 2018-01-01 RX ORDER — PROCHLORPERAZINE 25 MG
25 SUPPOSITORY, RECTAL RECTAL EVERY 12 HOURS PRN
Status: DISCONTINUED | OUTPATIENT
Start: 2018-01-01 | End: 2018-01-01

## 2018-01-01 RX ORDER — ONDANSETRON 2 MG/ML
4 INJECTION INTRAMUSCULAR; INTRAVENOUS EVERY 6 HOURS PRN
Status: CANCELLED | OUTPATIENT
Start: 2018-01-01

## 2018-01-01 RX ORDER — SOFT LENS DISINFECTANT
SOLUTION, NON-ORAL MISCELLANEOUS
Qty: 1 DEVICE | Refills: 0 | Status: SHIPPED | OUTPATIENT
Start: 2018-01-01 | End: 2018-01-01

## 2018-01-01 RX ORDER — HEPARIN SODIUM 5000 [USP'U]/ML
5000 INJECTION, SOLUTION INTRAVENOUS; SUBCUTANEOUS ONCE
Status: COMPLETED | OUTPATIENT
Start: 2018-01-01 | End: 2018-01-01

## 2018-01-01 RX ORDER — ATROPINE SULFATE 10 MG/ML
2 SOLUTION/ DROPS OPHTHALMIC EVERY 2 HOUR PRN
Status: DISCONTINUED | OUTPATIENT
Start: 2018-01-01 | End: 2018-01-01

## 2018-01-01 RX ORDER — CEFADROXIL 500 MG/1
500 CAPSULE ORAL 2 TIMES DAILY
Qty: 20 CAPSULE | Refills: 0 | Status: ON HOLD | OUTPATIENT
Start: 2018-01-01 | End: 2018-01-01

## 2018-01-01 RX ORDER — FUROSEMIDE 40 MG/1
40 TABLET ORAL DAILY
Status: DISCONTINUED | OUTPATIENT
Start: 2018-01-01 | End: 2018-01-01

## 2018-01-01 RX ORDER — SCOLOPAMINE TRANSDERMAL SYSTEM 1 MG/1
1 PATCH, EXTENDED RELEASE TRANSDERMAL
Status: DISCONTINUED | OUTPATIENT
Start: 2018-01-01 | End: 2018-01-01

## 2018-01-01 RX ORDER — ONDANSETRON 2 MG/ML
4 INJECTION INTRAMUSCULAR; INTRAVENOUS EVERY 4 HOURS PRN
Status: DISCONTINUED | OUTPATIENT
Start: 2018-01-01 | End: 2018-01-01 | Stop reason: HOSPADM

## 2018-01-01 RX ORDER — SODIUM PHOSPHATE, DIBASIC AND SODIUM PHOSPHATE, MONOBASIC 7; 19 G/133ML; G/133ML
1 ENEMA RECTAL ONCE AS NEEDED
Status: DISCONTINUED | OUTPATIENT
Start: 2018-01-01 | End: 2018-01-01

## 2018-01-01 RX ORDER — METOPROLOL SUCCINATE 50 MG/1
TABLET, EXTENDED RELEASE ORAL
COMMUNITY
End: 2018-01-01

## 2018-01-01 RX ORDER — MAGNESIUM OXIDE 400 MG (241.3 MG MAGNESIUM) TABLET
400 TABLET ONCE
Status: DISCONTINUED | OUTPATIENT
Start: 2018-01-01 | End: 2018-01-01

## 2018-01-01 RX ORDER — HYDRALAZINE HYDROCHLORIDE 20 MG/ML
10 INJECTION INTRAMUSCULAR; INTRAVENOUS EVERY 4 HOURS PRN
Status: DISCONTINUED | OUTPATIENT
Start: 2018-01-01 | End: 2018-01-01

## 2018-01-01 RX ORDER — DOXYCYCLINE HYCLATE 100 MG/1
100 CAPSULE ORAL EVERY 12 HOURS SCHEDULED
Status: DISCONTINUED | OUTPATIENT
Start: 2018-01-01 | End: 2018-01-01

## 2018-01-01 RX ORDER — LORAZEPAM 1 MG/1
1 TABLET ORAL EVERY 4 HOURS PRN
Status: DISCONTINUED | OUTPATIENT
Start: 2018-01-01 | End: 2018-01-01

## 2018-01-01 RX ORDER — MELATONIN
325
Status: DISCONTINUED | OUTPATIENT
Start: 2018-01-01 | End: 2018-01-01

## 2018-01-01 RX ORDER — PREDNISONE 20 MG/1
40 TABLET ORAL
Status: DISCONTINUED | OUTPATIENT
Start: 2018-01-01 | End: 2018-01-01

## 2018-01-01 RX ORDER — PANTOPRAZOLE SODIUM 40 MG/1
40 TABLET, DELAYED RELEASE ORAL
Status: DISCONTINUED | OUTPATIENT
Start: 2018-01-01 | End: 2018-01-01

## 2018-01-01 RX ORDER — METOCLOPRAMIDE HYDROCHLORIDE 5 MG/ML
5 INJECTION INTRAMUSCULAR; INTRAVENOUS EVERY 8 HOURS PRN
Status: DISCONTINUED | OUTPATIENT
Start: 2018-01-01 | End: 2018-01-01

## 2018-01-01 RX ORDER — DIPHENHYDRAMINE HCL 25 MG
25 CAPSULE ORAL ONCE
Status: COMPLETED | OUTPATIENT
Start: 2018-01-01 | End: 2018-01-01

## 2018-01-01 RX ORDER — METOCLOPRAMIDE HYDROCHLORIDE 5 MG/ML
10 INJECTION INTRAMUSCULAR; INTRAVENOUS EVERY 8 HOURS PRN
Status: DISCONTINUED | OUTPATIENT
Start: 2018-01-01 | End: 2018-01-01

## 2018-01-01 RX ORDER — MORPHINE SULFATE 4 MG/ML
1 INJECTION, SOLUTION INTRAMUSCULAR; INTRAVENOUS
Status: CANCELLED | OUTPATIENT
Start: 2018-01-01

## 2018-01-01 RX ORDER — MORPHINE SULFATE 4 MG/ML
2 INJECTION, SOLUTION INTRAMUSCULAR; INTRAVENOUS
Status: DISCONTINUED | OUTPATIENT
Start: 2018-01-01 | End: 2018-01-01

## 2018-01-01 RX ORDER — HALOPERIDOL 1 MG/1
2 TABLET ORAL
Status: DISCONTINUED | OUTPATIENT
Start: 2018-01-01 | End: 2018-01-01

## 2018-01-01 RX ORDER — L.ACIDOPH/B.ANIMALIS/B.LONGUM 15B CELL
1 CAPSULE ORAL DAILY
COMMUNITY
End: 2018-01-01 | Stop reason: ALTCHOICE

## 2018-01-01 RX ORDER — DIPHENHYDRAMINE HCL 50 MG
50 CAPSULE ORAL ONCE
Status: DISCONTINUED | OUTPATIENT
Start: 2018-01-01 | End: 2018-01-01

## 2018-01-01 RX ORDER — FUROSEMIDE 10 MG/ML
40 INJECTION INTRAMUSCULAR; INTRAVENOUS EVERY 6 HOURS
Status: DISCONTINUED | OUTPATIENT
Start: 2018-01-01 | End: 2018-01-01

## 2018-01-01 RX ORDER — SPIRONOLACTONE 25 MG/1
TABLET ORAL
COMMUNITY
Start: 2018-01-01 | End: 2018-01-01

## 2018-01-01 RX ORDER — POLYETHYLENE GLYCOL 3350 17 G/17G
17 POWDER, FOR SOLUTION ORAL DAILY PRN
Status: DISCONTINUED | OUTPATIENT
Start: 2018-01-01 | End: 2018-01-01

## 2018-01-01 RX ORDER — ATORVASTATIN CALCIUM 10 MG/1
10 TABLET, FILM COATED ORAL
Status: DISCONTINUED | OUTPATIENT
Start: 2018-01-01 | End: 2018-01-01

## 2018-01-01 RX ORDER — DIPHENHYDRAMINE HYDROCHLORIDE 50 MG/ML
25 INJECTION INTRAMUSCULAR; INTRAVENOUS EVERY 6 HOURS PRN
Status: CANCELLED | OUTPATIENT
Start: 2018-01-01

## 2018-01-01 RX ORDER — PROCHLORPERAZINE MALEATE 10 MG
10 TABLET ORAL EVERY 4 HOURS PRN
Status: DISCONTINUED | OUTPATIENT
Start: 2018-01-01 | End: 2018-01-01

## 2018-01-01 RX ORDER — SPIRONOLACTONE 25 MG/1
25 TABLET ORAL 2 TIMES DAILY
Status: ON HOLD | COMMUNITY
End: 2018-01-01

## 2018-01-01 RX ORDER — ENOXAPARIN SODIUM 100 MG/ML
40 INJECTION SUBCUTANEOUS DAILY
Status: DISCONTINUED | OUTPATIENT
Start: 2018-01-01 | End: 2018-01-01

## 2018-01-01 RX ORDER — HEPARIN SODIUM 5000 [USP'U]/ML
5000 INJECTION, SOLUTION INTRAVENOUS; SUBCUTANEOUS EVERY 8 HOURS SCHEDULED
Status: DISCONTINUED | OUTPATIENT
Start: 2018-01-01 | End: 2018-01-01

## 2018-01-01 RX ORDER — CIPROFLOXACIN HYDROCHLORIDE 3.5 MG/ML
1 SOLUTION/ DROPS TOPICAL
Status: DISCONTINUED | OUTPATIENT
Start: 2018-01-01 | End: 2018-01-01

## 2018-01-01 RX ORDER — NYSTATIN 100000 U/G
1 CREAM TOPICAL DAILY
Status: ON HOLD | COMMUNITY
End: 2018-01-01

## 2018-01-01 RX ORDER — BISACODYL 10 MG
10 SUPPOSITORY, RECTAL RECTAL DAILY PRN
Status: ON HOLD | COMMUNITY
End: 2018-01-01

## 2018-01-01 RX ORDER — KETOROLAC TROMETHAMINE 30 MG/ML
30 INJECTION, SOLUTION INTRAMUSCULAR; INTRAVENOUS EVERY 6 HOURS
Status: DISCONTINUED | OUTPATIENT
Start: 2018-01-01 | End: 2018-01-01

## 2018-01-01 RX ORDER — CEFADROXIL 500 MG/1
500 CAPSULE ORAL 2 TIMES DAILY
Qty: 20 CAPSULE | Refills: 0 | Status: SHIPPED | OUTPATIENT
Start: 2018-01-01 | End: 2018-01-01 | Stop reason: ALTCHOICE

## 2018-01-01 RX ORDER — LORAZEPAM 2 MG/ML
1 INJECTION INTRAMUSCULAR EVERY 4 HOURS PRN
Status: DISCONTINUED | OUTPATIENT
Start: 2018-01-01 | End: 2018-01-01

## 2018-01-01 RX ORDER — ACETAMINOPHEN 10 MG/ML
1000 INJECTION, SOLUTION INTRAVENOUS EVERY 6 HOURS PRN
Status: DISCONTINUED | OUTPATIENT
Start: 2018-01-01 | End: 2018-01-01

## 2018-01-01 RX ORDER — AMLODIPINE BESYLATE 5 MG/1
10 TABLET ORAL
Status: DISCONTINUED | OUTPATIENT
Start: 2018-01-01 | End: 2018-01-01

## 2018-01-01 RX ORDER — BENAZEPRIL HYDROCHLORIDE 10 MG/1
10 TABLET ORAL
COMMUNITY
End: 2018-01-01

## 2018-01-01 RX ORDER — BENAZEPRIL HYDROCHLORIDE 20 MG/1
TABLET ORAL
Qty: 180 TABLET | Refills: 1 | Status: SHIPPED | OUTPATIENT
Start: 2018-01-01 | End: 2018-01-01

## 2018-01-01 RX ORDER — FUROSEMIDE 10 MG/ML
40 INJECTION INTRAMUSCULAR; INTRAVENOUS EVERY 6 HOURS SCHEDULED
Status: COMPLETED | OUTPATIENT
Start: 2018-01-01 | End: 2018-01-01

## 2018-01-01 RX ORDER — METOCLOPRAMIDE 10 MG/1
10 TABLET ORAL EVERY 6 HOURS PRN
Status: DISCONTINUED | OUTPATIENT
Start: 2018-01-01 | End: 2018-01-01

## 2018-01-01 RX ORDER — LISINOPRIL 20 MG/1
20 TABLET ORAL DAILY
Status: DISCONTINUED | OUTPATIENT
Start: 2018-01-01 | End: 2018-01-01

## 2018-01-01 RX ORDER — PROCHLORPERAZINE 25 MG
25 SUPPOSITORY, RECTAL RECTAL EVERY 6 HOURS PRN
Status: DISCONTINUED | OUTPATIENT
Start: 2018-01-01 | End: 2018-01-01

## 2018-01-01 RX ORDER — LEVOTHYROXINE SODIUM 0.2 MG/1
TABLET ORAL
Qty: 90 TABLET | Refills: 0 | Status: SHIPPED | OUTPATIENT
Start: 2018-01-01 | End: 2018-01-01

## 2018-01-29 NOTE — TELEPHONE ENCOUNTER
Pending Prescriptions Disp Refills    METOPROLOL TARTRATE 50 MG Oral Tab [Pharmacy Med Name: METOPROLOL  50MG  TAB  TARTRATE] 180 tablet      Sig: TAKE 1 TABLET BY MOUTH  TWICE A DAY       last home visit 09/12/2017, patient is at [facility]

## 2018-03-09 NOTE — TELEPHONE ENCOUNTER
Requesting: Levothyroxine 200mcg and 25mcg  LOV: 9/12/17 Assisted Living Facility  RTC: ?  Last Relevant Labs:2/17/18  TSH hypersensitive 5.150  Filled: 12/12/17 #90 with 0 refills. [facility]    No future appointments. Passes protocol.

## 2018-04-04 NOTE — TELEPHONE ENCOUNTER
Refill requests for Atorvastatin, Amlodipine, Benazepril and Metoprolol failed protocol because there are no recent OV.     Routed to Dr Linsey Echeverria

## 2018-04-20 NOTE — TELEPHONE ENCOUNTER
s/w nurse, Tho, Both legs are red and swollen, more than baseline  No fevers.  Has chronic venous stasis dermatitis  Pt at [facility]  No fevers  On diuretic  Will start Duricef bid   Pt will see Dr. Arroyo Awkclem next week but to ED sooner if fevers or sxs wor

## 2018-04-20 NOTE — TELEPHONE ENCOUNTER
Tho from [facility] called to ask for orders for Pt to have an antibiotic for Cellulitis on both legs, red & warm to touch.   Fax to [facility] at [address]

## 2018-04-24 PROBLEM — E87.2 RESPIRATORY ACIDOSIS: Status: ACTIVE | Noted: 2018-01-01

## 2018-04-24 PROBLEM — E87.3 METABOLIC ALKALOSIS: Status: ACTIVE | Noted: 2018-01-01
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
## 2018-04-24 PROBLEM — R06.03 RESPIRATORY DISTRESS: Status: ACTIVE | Noted: 2018-01-01

## 2018-04-24 PROBLEM — J96.01 ACUTE RESPIRATORY FAILURE WITH HYPOXIA (HCC): Status: ACTIVE | Noted: 2018-01-01

## 2018-04-24 PROBLEM — J15.6 GRAM-NEGATIVE PNEUMONIA (HCC): Status: ACTIVE | Noted: 2018-01-01

## 2018-04-24 PROBLEM — R73.9 HYPERGLYCEMIA: Status: ACTIVE | Noted: 2018-01-01

## 2018-04-24 PROBLEM — R06.2 WHEEZING: Status: ACTIVE | Noted: 2018-01-01

## 2018-04-24 PROBLEM — J18.9 NOSOCOMIAL PNEUMONIA: Status: ACTIVE | Noted: 2018-01-01

## 2018-04-24 NOTE — PROGRESS NOTES
[facility] Pharmacy Note: Antimicrobial Weight Dose Adjustment for: piperacillin/tazobactam (Deyvi Villalta)    Lynda Dhaliwal is a 67year old female who has been prescribed piperacillin/tazobactam (ZOSYN) 3.375 gram IVPB for one dose.   CrCl is estimated creatinine kira

## 2018-04-24 NOTE — ED PROVIDER NOTES
Patient Seen in: [facility] Emergency Department    History   Patient presents with:  Dyspnea DAKOTAH SOB (respiratory)    Stated Complaint: sob    HPI    58-year-old with a history of atrial fibrillation, breast cancer, diverticulitis, chronic lymphede Smokeless tobacco: Never Used                      Alcohol use: No                Review of Systems    Positive for stated complaint: sob  Other systems are as noted in HPI. Constitutional and vital signs reviewed.       All other systems reviewed and DIFFERENTIAL - Abnormal; Notable for the following:     MCV 80.6 (*)     MCH 24.6 (*)     MCHC 30.5 (*)     RDW 17.9 (*)     RDW-SD 51.8 (*)     All other components within normal limits   LACTIC ACID, PLASMA - Normal   TROPONIN I - Normal   CBC WITH DIFFE suggestion of pneumonia on x-ray to explain her symptoms, reasonable PO2 on ABG. She has an IV contrast allergy so could not tolerate a CTA of the chest.  I did not feel comfortable taking her off the BiPAP for prolonged amount of time for VQ.   This could Respiratory distress R06.03 4/24/2018 Unknown

## 2018-04-24 NOTE — ED INITIAL ASSESSMENT (HPI)
Pt from [address] [place] [address] with c/o SOB, productive cough with green sputum x 2 days. Pt states last week had diarrhea. Pt received 2 albuterol treatments by EMS PTA.

## 2018-04-25 NOTE — SLP NOTE
ADULT SWALLOWING EVALUATION    ASSESSMENT    ASSESSMENT/OVERALL IMPRESSION:  Pt seen at bedside this AM for swallow evaluation. RN approved session. Pt admitted due to SOB.  Pt diagnosed with acute respiratory distress secondary to PNA, CHF exacerbation, an respiratory failure with hypoxia (HCC)    Gram-negative pneumonia [facility])      Past Medical History  Past Medical History:   Diagnosis Date   • Arrhythmia    • Breast CA ([facility] [address] 1995   • Cancer ([facility] [address]     breast cancer    • Cataract    • Diverticulitis of colon esophageal involvement      GOALS  Goal #1 The patient will tolerate regular consistency and thin liquids without overt signs or symptoms of aspiration with 100 % accuracy over 1-2 session(s).   New Goal   Goal #2 The patient/family/caregiver will demonstra

## 2018-04-25 NOTE — PHYSICAL THERAPY NOTE
PHYSICAL THERAPY EVALUATION - INPATIENT     Room Number: 454/454-A  Evaluation Date: 4/25/2018  Type of Evaluation: Initial  Physician Order: PT Eval and Treat    Presenting Problem: Pneumonia, respiratory distress  Reason for Therapy: Mobility Dysfu 1996  No date: MIMI BIOPSY STEREOTACTIC NODULE 2 SITE BILAT      Comment: 07/2010 STEREO BX. BN.  No date: RADIATION RIGHT  1995: THYROIDECTOMY      Comment: Total    HOME SITUATION  Type of Home: Assisted living facility   Home Layout: One level  Stairs to ASSESSMENT  Upper extremity ROM and strength are within functional limits     Lower extremity ROM is within functional limits - end ranges limited by obesity    Lower extremity strength is within functional limits - grossly 4-/5 w/I available range.   Funct placement and body mechanics to complete task. Pt denied dizziness upon sitting. Pt given a few minutes in sitting to adjust to position chair. Obtained bariatric walker.   Pt completed sit<>stand w/ cues for proper hand placement and min a of 1 to stabi clinical presentation is evolving and overall the evaluation complexity is considered moderate. These impairments and comorbidities manifest themselves as functional limitations in independent bed mobility, transfers, and gait.   The patient is slightly

## 2018-04-25 NOTE — PLAN OF CARE
SKIN/TISSUE INTEGRITY - ADULT    • Skin integrity remains intact Not Progressing          RESPIRATORY - ADULT    • Achieves optimal ventilation and oxygenation Progressing          Assumed care of pt @2100.  Brought up from ED on Bipap and transferred into

## 2018-04-25 NOTE — HISTORICAL OFFICE NOTE
Flaca Jenn  : 1945  ACCOUNT:  [id number]  [phone]  PCP: Dr. Akhil Parekh     TODAY'S DATE: 2017  DICTATED BY:  BONY Kellogg]      CHIEF COMPLAINT: [Followup of Atrial fibrillation, chronic and Followup of Hypertension.]    HPI: smoking. CAFFEINE: 2 beverages daily. ALCOHOL: drinks socially. EXERCISE: no regular exercise. DIET: no special diet. MARITAL STATUS: single. OCCUPATION: retired teacher.      ALLERGIES: Adhesive Bandages and Iodinated Diagnostic Agents - CLASS    MEDICATIO of Holter monitor, please discuss with us at that time whether or not you want to proceed watchman device implant.]]    PRESCRIPTIONS:   10/27/17 AmLODIPine Besylate   5MG       1 tab twice daily                        06/10/16 Atorvastatin Calcium  10MG past who told her she has uterine fibroids. Apparently, they recommended a hysterectomy, but the patient did not want any major surgery. She has remained on Xarelto since the last time I saw her last year.  Unfortunately, she is having more frequent vaginal pressure not elevated. RESP: respirations with normal rate and rhythm, clear to auscultation. GI: no masses, tenderness or hepatosplenomegaly, rectal deferred. MS: adequate gait for exercise/testing. EXT: no clubbing or cyanosis.   SKIN: no rashes, lesions, controlled, and the fact that she has been in atrial fibrillation for at least over a year, I would not recommend a cardioversion at this point, as I think the yield would be fairly low. I would just recommend rate control and hopefully anticoagulation.

## 2018-04-25 NOTE — PROGRESS NOTES
/72   Pulse 76   Temp 97.8 °F (36.6 °C) (Temporal)   Resp 18   SpO2 90%               Patient presents with:  Skin: redness groin   Medication Follow-Up: patient is on antibiotic for cellulitis on both legs       HPI;    Lynda Dhaliwal is a 67 year Rfl: 0   LEVOTHYROXINE SODIUM 200 MCG Oral Tab TAKE 1 TABLET BY MOUTH ONCE DAILY Disp: 90 tablet Rfl: 0   LEVOTHYROXINE SODIUM 25 MCG Oral Tab TAKE 1 TABLET BY MOUTH  DAILY BEFORE BREAKFAST Disp: 90 tablet Rfl: 0   COLCHICINE 0.6 MG Oral Tab TAKE 1 TABLET Alcohol use:  No                 REVIEW OF SYSTEMS:   GENERAL HEALTH: Short of breath  SKIN: denies any unusual skin lesions or rashes  RESPIRATORY: denies shortness of breath with exertion  CARDIOVASCULAR: denies chest pain on exertion  GI: emergency room. The patient indicates understanding of these issues and agrees to the plan.   Imaging & Consults:  None  Meds & Refills for this Visit:  No prescriptions requested or ordered in this encounter  No orders of the defined types were placed i

## 2018-04-25 NOTE — PROGRESS NOTES
[facility] Pharmacy Note: Antimicrobial Weight Dose Adjustment for: piperacillin/tazobactam (Leslie Espinoza)    Clifton Ennis is a 67year old female who has been prescribed piperacillin/tazobactam (ZOSYN) 3.375 gm IVPB every 8 hours.   CrCl is estimated creatinine clear

## 2018-04-25 NOTE — PROGRESS NOTES
ICU  Critical Care APRN Progress Note    NAME: Valrie Felty - ROOM: C2/ - MRN: [id number] - Age: 67year old - :1945    History Of Present Illness:  Valrie Felty is a 67year old female with PMHx significant for atrial fibrillation on Xarel General Appearance: Alert, cooperative, slight respiratory distress, appears stated age  Neck: No JVD, neck supple, no adenopathy, trachea midline, no carotid bruits  Lungs: wheezing to auscultation bilaterally, respirations labored  Heart: a fib, S1 and S -low IV fluid rate    #Afib  -Xarelto stopped 2/2 bleeding       #Lymphedema  -US BLE   -elevate  -wound care and pt eval    #self care deficit as noted by primary note  -social work consult, may need adult protective services, possibly animal control?

## 2018-04-25 NOTE — CONSULTS
[facility]  Report of Consultation    Moe Mahin Patient Status:  Inpatient    1945 MRN [id number]   [facility] 4SW-A Attending Remington Reddy MD   Hosp Day # 1 PCP Jennie Khan MD     Reason for Consultation: Hypercarbic antibiotic therapy. She was placed in the intensive care unit on the basis of her blood gases and out of concern for impending respiratory failure. This morning she is awake alert and interactive on BiPAP. She denies chest pain, hemoptysis, pleurisy.   Martha Núñez BEFORE BREAKFAST Disp: 90 tablet Rfl: 0    furosemide 40 MG Oral Tab Take 1 tablet by mouth  daily Disp: 90 tablet Rfl: 2    ferrous sulfate 325 (65 FE) MG Oral Tab EC Take 1 tablet (325 mg total) by mouth daily with breakfast. Disp: 30 tablet Rfl: 2    Po mouth, throat, and face: Negative for hearing loss, tinnitus, nasal congestion, snoring, sore throat, hoarseness and voice change. Respiratory: See HPI above.   Cardiovascular: Negative for chest pain, palpitations, syncope, fatigue, orthopnea, paroxysmal supple neck; trachea midline, no adenopathy, no thyromegaly. Lungs: Clear anteriorly without wheezes or crackles   Chest wall: No tenderness or deformity. Heart: Irregularly irregular/atrial fibrillation, rate acceptable, normal S1S2, no murmur.    Angelina Wang apnea likely in this patient  Chronic atrial fibrillation without anticoagulation at present due to vaginal bleeding  Vaginal bleeding likely to represent uterine fibroids although neoplastic disease not eliminated from consideration  Chronic lower extremi

## 2018-04-25 NOTE — PROGRESS NOTES
[facility] Pharmacy Note: Route Optimization for Pantoprazole (PROTONIX)    Patient is currently on Pantoprazole (PROTONIX) 40 mg IV every 24 hours.    The patient meets the criteria to convert to the oral equivalent as established by the IV to Oral conversion pro

## 2018-04-25 NOTE — PLAN OF CARE
CARDIOVASCULAR - ADULT    • Maintains optimal cardiac output and hemodynamic stability Progressing        RESPIRATORY - ADULT    • Achieves optimal ventilation and oxygenation Progressing        This am received pt on bipap.  Taken off at 0830 & placed on n

## 2018-04-25 NOTE — PROGRESS NOTES
[facility] Pharmacy Progress Note:  Anticoagulation Weight Dose Adjustment for enoxaparin (Rogenia Hashimoto)    Melissa Ny is a 67year old female who has been prescribed enoxaparin (LOVENOX) for VTE prophylaxis.       Estimated Creatinine Clearance: 64.9 mL/min (base

## 2018-04-25 NOTE — PROGRESS NOTES
[facility]  Progress Note     Jae Lebron Patient Status:  Inpatient    1945 MRN [id number]   [facility] 4SW-A Attending Roseanne Menjivar MD   Hosp Day # 1 PCP Michael Ortez MD     Chief Complaint:  SOB cough     S: Patient Results  Component Value Date   TSH 2.620 04/25/2018       Imaging: Imaging data reviewed in Epic.     Medications:   • potassium chloride 40mEq IVPB (peripheral line)  40 mEq Intravenous Once   • MethylPREDNISolone Sodium Succ  60 mg Intravenous Q12H   • f None    Plan of care:   + RVP   + respir syncytial virus    Droplet isolation   Urine cx P   Low iron and saturation levels   Not on AC  Had vaginal bleeding several months ago  Has not be reevaluated   PCT neg   legionella, strep- P   US limited due to vira 4/25/2018  [id number]      Chief complaint: sob    Subjective:  No acute events overnight. doing better today. ROS:   8 point ROS negative except for that stated in subjective.     Objective:  Constitutional: no signs of distress; vitals stable; morbid obes

## 2018-04-25 NOTE — CM/SW NOTE
04/25/18 1126   CM/SW Referral Data   Referral Source Physician   Reason for Referral Discharge planning  (living conditions)   Informant Patient   Pertinent Medical Hx   Primary Care Physician Name carlyn skelton   Significant Past Medical/Mental Health Hx atr patient returns to assisted living, primary rn to call report to [phone] and send avs.  Patient requests bariatric medicar upon discharge, agreeable to charges.     Moisés Kumar RN,   Phone [phone]  Pager [phone]

## 2018-04-25 NOTE — H&P
[facility]  History and Physical     Angelina Kandi Patient Status:  Inpatient    1945 MRN [id number]   [facility] 4SW-A Attending [facility] Day # 0 PCP Dulce Maria Gillette MD     Chief Complaint: Shortness History:  reports that she has never smoked. She has never used smokeless tobacco. She reports that she does not drink alcohol or use drugs.     Family History:   Family History   Problem Relation Age of Onset   • Breast Cancer Self    • Arthritis Father for Pain. Disp: 30 tablet Rfl: 0   Cefadroxil 500 MG Oral Cap Take 1 capsule (500 mg total) by mouth 2 (two) times daily.  Disp: 20 capsule Rfl: 0   AMLODIPINE BESYLATE 10 MG Oral Tab TAKE 1 TABLET BY MOUTH  DAILY Disp: 90 tablet Rfl: 1   METOPROLOL TARTRAT 7.7*   ALB  3.1*   NA  144   K  3.8   CL  105   CO2  30.0   ALKPHO  126   AST  32   ALT  17   BILT  0.7   TP  7.2       Estimated Creatinine Clearance: 64.9 mL/min (based on SCr of 0.72 mg/dL). No results for input(s): PTP, INR in the last 72 hours. expected to span two midnight's based on the clinical documentation in H+P. Based on patients current state of illness, I anticipate that, after discharge, patient will require TBD.

## 2018-04-26 NOTE — CM/SW NOTE
04/26/18 0849   Discharge disposition   Expected discharge disposition [facility]   Name of Facillity[facility]Home Care[facility] [address]   Discharge transportation [address]     PT recommending patient can return to assisted living level of care.   pr

## 2018-04-26 NOTE — CONSULTS
[facility]  Report of Inpatient Wound Care Consultation     Maurilio Fuentes Patient Status:  Inpatient    1945 MRN [id number]   [facility] 4SW-A Attending Nargis Garcia, [address] [facility] [address] Day # 2 PCP Noam Roberson MD     REASON FOR CON 11.0*   --    --    --   10.9*   --    HCT  40.3   --    --   36.7   --    --    --   36.0   --    PLT  202.0   --    --   197.0   --    --    --   199.0   --    K  3.8   --    --   3.5*   --    --    --   3.5*   --    CREATSERUM  0.72   --    --   0.72 Equipment:  NA  Offloading/Footwear:  NA  Compression:  NA    Physical Therapy Wound Goals:  1. Maintain optimal wound healing environment  2. Remove wound bioburden  3. Decrease periwound edema      PLAN OF CARE:   Continue to follow peripherally.   Will f

## 2018-04-26 NOTE — PROGRESS NOTES
Seen and examined. Really looks better today. Breathing easier and heart rates are down.     Afebrile  159/92  86 irregular    Lungs less congestion  Ht irregular  abd obese, soft  Ext chronic edema    Excellent diuresis yesterday    CXR reviewed    14/0.7

## 2018-04-26 NOTE — PHYSICAL THERAPY NOTE
PHYSICAL THERAPY TREATMENT NOTE - INPATIENT    Room Number: 454/454-A     Session: 1   Number of Visits to Meet Established Goals: 3    Presenting Problem: Pneumonia, respiratory distress    History related to current admission: Pt is 67year old female a RADIATION RIGHT  1995: THYROIDECTOMY      Comment: Total    SUBJECTIVE  \"It feels good to get moving. \"    Patient’s self-stated goal is to move out of assisted living and return to her house.     OBJECTIVE  Precautions: Limb alert - right;Low vision    WE to therapy. Pt requests use of commode prior to ambulation in hallway. Sit to stand completed with CGA for balance; verbal cues for anterior weightshift.  Pt tolerates ambulation x 5 feet to bedside commode with use of bariatric rolling walker; minimal verb Recommendations: Assisted living with PT     PLAN  PT Treatment Plan: Bed mobility; Endurance; Energy conservation;Patient education;Gait training;Strengthening;Transfer training;Balance training  Rehab Potential : Good  Frequency (Obs): 5x/week    CURRENT G

## 2018-04-26 NOTE — PROGRESS NOTES
[facility]ITAL[facility]  Progress Note     Susana Constable Patient Status:  Inpatient    1945 MRN [id number]   [facility] 4SW-A Attending Chris Grewal MD   Hosp Day # 2 PCP Arcenio Staples MD     Chief Complaint:  SOB cough     S: Patient  Feel TP  7.2   --    --        Estimated Creatinine Clearance: 64 mL/min (based on SCr of 0.73 mg/dL).     Recent Labs   Lab  04/24/18   2350  04/25/18   0425  04/26/18   0424   PTP  16.5*  16.8*  17.1*   INR  1.28*  1.31*  1.34*       Recent Labs   Lab  04/2 55.55  9.   Possible lower extremity cellulitis-covered with Zosyn.        Quality:  · DVT Prophylaxis: Lovenox  · CODE status: Full  · Conklin: None    Plan of care:   Cont steroids , nebs  May need noc therapy at home  Wound care following  drsgs to folds,

## 2018-04-26 NOTE — SLP NOTE
SPEECH DAILY NOTE - INPATIENT    ASSESSMENT & PLAN   ASSESSMENT  Pt seen for meal assess/dysphagia therapy to monitor po tolerance of recommended diet and ensure adherence to aspiration precautions. Pt alert and sitting upright in chair.   Pt tolerated ramirez

## 2018-04-26 NOTE — PLAN OF CARE
CARDIOVASCULAR - ADULT    • Maintains optimal cardiac output and hemodynamic stability Progressing    • Absence of cardiac arrhythmias or at baseline Progressing        Impaired Activities of Daily Living    • Achieve highest/safest level of independence i integrity remains intact Progressing    • Incision(s), wounds(s) or drain site(s) healing without S/S of infection Progressing        Up chair and bsc with contact assist, using walker, obesity makes it difficult for her to wipe her bottom, so needs assist

## 2018-04-26 NOTE — OCCUPATIONAL THERAPY NOTE
OCCUPATIONAL THERAPY EVALUATION - INPATIENT     Room Number: 454/454-A  Evaluation Date: 4/26/2018  Type of Evaluation: Initial  Presenting Problem: Respiratory distress, pneumonia, RSV    Physician Order: IP Consult to Occupational Therapy  Reason for The breath    • Unspecified sinusitis (chronic)        Past Surgical History  Past Surgical History:  1996: BIOPSY/REMOVAL, LYMPH NODE(S)  11/9/01: COLONOSCOPY      Comment: (fiberoptic) Choco Bruner MD  repeat 5 yrs  1996: LUMPECTOMY RIGHT      Comment: RT. SUBJECTIVE   Patient stated that she believes she is progressing. Patient self-stated goal is to get back to her assisted living facility.       OBJECTIVE  Precautions: Limb alert - right;Low vision  Fall Risk: High fall risk    WEIGHT BEARING RESTR FINDINGS  Neurological Findings: None                ACTIVITY TOLERANCE   O2 Saturation: 98%  O2 Device: Nasal cannula  Liters of O2:  4  No shortness of breath  Heart Rate:   Blood Pressure: 153/86    ACTIVITIES OF DAILY LIVING ASSESSMENT  AM-PAC ‘6 on lymphedema after D/C from hospital.     Patient End of Session: Up in chair;Needs met;Call light within reach;RN aware of session/findings; All patient questions and concerns addressed    ASSESSMENT     Patient is a 67year old female admitted on 4/24/20 health PT/OT at D/C to reach Mod I level with toileting and LB dressing to allow for maximal independence and safety. Would also benefit from home modifications and equipment recommendations in the home setting.        Patient Complexity  Occupational Profi

## 2018-04-26 NOTE — PROGRESS NOTES
[facility]  Progress Note    Jae Lebron Patient Status:  Inpatient    1945 MRN [id number]   [facility] 4SW-A Attending Sienna Spence MD   [facility] Day # 2 PCP Michael Ortez MD     Subjective:  Jae Lebron is a(n) 67 year ol atelectasis versus infiltrate seems somewhat improved especially in the right side      Medications reviewed     Assessment and Plan:   Patient Active Problem List:     Disorder of bursae and tendons in shoulder region     Venous (peripheral) insufficiency neoplastic disease not eliminated from consideration  -Chronic lower extremity lymphedema  -Right upper extremity lymphedema in association with a distant history of breast CA treated with lumpectomy and follow-up radiation therapy  -History of hypothyroid

## 2018-04-27 NOTE — PROGRESS NOTES
[facility]  Progress Note    Angela Umana Patient Status:  Inpatient    1945 MRN [id number]   [facility] 4SW-A Attending Job Dial, MD   [facility] Day # 3 PCP Rissa Henry MD     Subjective:  Angela Umana is a(n) 67 year ol 29.0  31.0  34.0*       Recent Labs   Lab  04/24/18   2350  04/25/18   0425  04/26/18   0424   PTP  16.5*  16.8*  17.1*   INR  1.28*  1.31*  1.34*   PTT  41.6*  46.0*  44.6*       Cultures: RSV urine culture negative, sputum cultures pending    Radiology:

## 2018-04-27 NOTE — PROGRESS NOTES
Notes reviewed. Clinically improving. Afebrile  Heart rates adequately controlled. Blood pressures adequate at present as well. Continues to demonstrate good diuretic response.     Would continue IV diuretics while here as long as renal function tole

## 2018-04-27 NOTE — CM/SW NOTE
RN received call from [facility] advising they are current [place] [address] provider with patient. Phone [phone]  Fax [phone].     Oumou Sanders, 04/27/18, 11:55 AM

## 2018-04-27 NOTE — PROGRESS NOTES
[facility]  Progress Note     Trina Fairchild Patient Status:  Inpatient    1945 MRN [id number]   [facility] 4SW-A Attending Martina Kemp MD   Hosp Day # 3 PCP Maya Lord MD     Chief Complaint:  SOB cough     S: Patient   Up CL  105  106  105  102   CO2  30.0  29.0  31.0  34.0*   ALKPHO  126   --    --    --    AST  32   --    --    --    ALT  17   --    --    --    BILT  0.7   --    --    --    TP  7.2   --    --    --        Estimated Creatinine Clearance: 53.7 mL/min (bas Ultrasound of the lower extremities  Neg   Wound care following   7. Type 2 diabetes-currently on medications. Will monitor Accu-Cheks with correction factor as  needed. A1c 6.6   8. Morbid Obesity-BMI is 55.55  9.   Possible lower extremity celluliti 0.7   --    --    --    TP  7.2   --    --    --      Plan as outlined above  Najma SALDANA

## 2018-04-27 NOTE — PHYSICAL THERAPY NOTE
PHYSICAL THERAPY TREATMENT NOTE - INPATIENT    Room Number: 454/454-A     Session: 2   Number of Visits to Meet Established Goals: 3    Presenting Problem: Pneumonia, respiratory distress     History related to current admission: Pt is 67year old female date: RADIATION RIGHT  1995: THYROIDECTOMY      Comment: Total    SUBJECTIVE  \"I'm feeling that my breathing is better\"    Patient’s self-stated goal is to be d/lila soon.     OBJECTIVE  Precautions: Limb alert - right;Low vision    WEIGHT BEARING RESTRICT completed gait 150'x1 w/ rolling walker and supervision assist.  Pt's gait characterized by lateral sway, flat foot pattern.      THERAPEUTIC EXERCISES  Lower Extremity Ankle pumps  Hip adduction squeezes  LAQ     Upper Extremity      Position Sitting     R

## 2018-04-27 NOTE — OCCUPATIONAL THERAPY NOTE
OCCUPATIONAL THERAPY TREATMENT NOTE - INPATIENT     Room Number: 5099/3405-X  Session: 1   Number of Visits to Meet Established Goals: 5    Presenting Problem: Respiratory distress, pneumonia, RSV    History related to current admission:  Pt is 67year old BIOPSY/REMOVAL, LYMPH NODE(S)  11/9/01: COLONOSCOPY      Comment: (fiberoptic) Syl Kinney MD  repeat 5 yrs  1996: LUMPECTOMY RIGHT      Comment: RT.LUMPECTOMY 1996  No date: MIMI BIOPSY STEREOTACTIC NODULE 2 SITE BILAT      Comment: 07/2010 STEREO BX.  B issued. Patient verbalized understanding regarding information provided. Patient End of Session: Up in chair; With [facility] staff;Needs met;Call light within reach;RN aware of session/findings; All patient questions and concerns addressed    ASSESSMENT   Pt cont

## 2018-04-28 NOTE — PLAN OF CARE
Recieved patient from ICU at 1630pm. Patient is alert and oriented. On 2L NC, Afib on tele. Ambulates with SBA and walker. VSS. Right arm precautions, isolation for RSV. POC updated with patient, all questions answered.  Call light within reach, will contin

## 2018-04-28 NOTE — PROGRESS NOTES
[facility]  Progress Note    Chio Dempsey Patient Status:  Inpatient    1945 MRN [id number]   [facility] 8NE-A Attending Dalila Ahumada, MD   [address] [facility] Day # 4 PCP Roseanne Torres MD     Subjective:  Chio Dempsey is a(n) 67 year ol Cultures: Positive for RSV; thus far sputum normal respiratory clinton    Radiology:  Chest x-ray remains with elevation the right hemidiaphragm and some congestion in the left base mild pulmonary vascular redistribution      Medications reviewed this patient  -Chronic atrial fibrillation without anticoagulation at present due to vaginal bleeding  -Vaginal bleeding likely to represent uterine fibroids although neoplastic disease not eliminated from consideration  -Chronic lower extremity lymphedema

## 2018-04-28 NOTE — PROGRESS NOTES
[facility]ITAL[facility]  Progress Note     Maurilio Fuentes Patient Status:  Inpatient    1945 MRN [id number]   [facility] 4SW-A Attending Juany Fagan MD   Hosp Day # 4 PCP Noam Roberson MD     Chief Complaint:  RSV    S: Patient slept throug furosemide  40 mg Intravenous BID (Diuretic)   • Levothyroxine Sodium  225 mcg Oral Before breakfast   • ipratropium-albuterol  3 mL Nebulization 6 times per day   • doxycycline  100 mg Intravenous Q12H   • atorvastatin  10 mg Oral Daily   • lisinopril  20

## 2018-04-29 NOTE — PROGRESS NOTES
[facility]  Progress Note    Lynn Raymundo Patient Status:  Inpatient    1945 MRN [id number]   [facility] 8NE-A Attending Nicolas Mahan MD   [facility] Day # 5 PCP Romel Garcia MD     Subjective:  Lynn Raymundo is a(n) 67 year ol region     Venous (peripheral) insufficiency     Swelling of limb     Cervicalgia     Screening for malignant neoplasm of the cervix     Osteoarthritis     Type II diabetes mellitus, uncontrolled ([facility] [address]     Essential hypertension     Obesity, unspecified history of breast CA treated with lumpectomy and follow-up radiation therapy  -History of hypothyroidism, status post thyroidectomy, on repletion therapy       ABG is stable without using nocturnal ventilation overnight plan to continue off machine and rec

## 2018-04-29 NOTE — PLAN OF CARE
CARDIOVASCULAR - ADULT    • Maintains optimal cardiac output and hemodynamic stability Progressing    • Absence of cardiac arrhythmias or at baseline Progressing        Impaired Activities of Daily Living    • Achieve highest/safest level of independence i restriction    Right arm precautions for hx of lumpectomy  Droplet precautions for RSV    Pt updated on POC and instructed on use of call light for any needs/complaints. All pt needs met, call light within reach.

## 2018-04-29 NOTE — PLAN OF CARE
Pt. Is alert and oriented times four. Lungs with crackles throughout. Pt. Is on 2 liters nasal cannula: O2 sat is 98%. Pt. Is in afib on monitor. Lower extremities with 3 plus edema noted. She is up with SBA and walker.

## 2018-04-29 NOTE — PROGRESS NOTES
[facility]  Progress Note     Angelina Chau Patient Status:  Inpatient    1945 MRN [id number]   [facility] 4SW-A Attending Clarisa Purvis MD   Hosp Day # 5 PCP Vahe Hurley MD     Chief Complaint:  RSV    S: Patient doing well. predniSONE  40 mg Oral Daily with breakfast   • Fluticasone Furoate-Vilanterol  1 puff Inhalation Daily   • spironolactone  25 mg Oral BID (Diuretic)   • furosemide  40 mg Intravenous BID (Diuretic)   • Levothyroxine Sodium  225 mcg Oral Before breakfast

## 2018-04-29 NOTE — PLAN OF CARE
[facility] [place] called and gave us a contact for report at [address] [facility] ext [phone]. Fax # [phone] for german. Thanks.

## 2018-04-29 NOTE — PROGRESS NOTES
Pt A&O x 4, A Fib in tele with occassional PVCs, SPO2 95-98% on 2L oxygen per nasal cannula, up to chair with standby assist with walker, denies any pain/SOB at this time, bilateral lower ext edema, will continue to monitor.

## 2018-04-30 NOTE — HOME CARE LIAISON
Referral received for home health from  Andrey Robledo. I attempted to see patient, currently occupied with ADLS. Will follow up tomorrow.  Thank you for the referral.

## 2018-04-30 NOTE — PROGRESS NOTES
[facility]  Progress Note    Jody Houser Patient Status:  Inpatient    1945 MRN [id number]   [facility] 8NE-A Attending Luciana Epley, MD   [facility] Day # 6 PCP Susannah Elmore MD     Subjective:  Jody Houser is a(n) 67 year ol Review:  Recent Labs   Lab  04/29/18   0509  04/30/18   0442   RBC  5.57*  5.44*   HGB  13.5  13.1   HCT  43.7  42.6   MCV  78.5*  78.3*   MCH  24.2*  24.1*   MCHC  30.9*  30.8*   RDW  18.3*  17.1*   NEPRELIM  8.70*  7.49*   WBC  11.2  10.5   PLT  210.0  1

## 2018-04-30 NOTE — PHYSICAL THERAPY NOTE
PHYSICAL THERAPY TREATMENT NOTE - INPATIENT    Room Number: 8551/0573-T     Session: 3   Number of Visits to Meet Established Goals: 3    Presenting Problem: Pneumonia, respiratory distress    Problem List  Principal Problem:    Respiratory distress  Acti air  No shortness of breath  Heart Rate: at rest 82 bpm and with activity 96 bpm    AM-PAC '6-Clicks' INPATIENT SHORT FORM - BASIC MOBILITY  How much difficulty does the patient currently have. ..  -   Turning over in bed (including adjusting bedclothes, sh addressed    ASSESSMENT   The pt has met inpatient therapy goals and is deemed safe for discharge home to assisted living, in terms of functional mobility. The pt will benefit from HOME PT in order to ensure safe transition to home.     DISCHARGE RECOMMEND

## 2018-04-30 NOTE — PROGRESS NOTES
[facility]  Progress Note     Osimn Moore Patient Status:  Inpatient    1945 MRN [id number]   [facility] 4SW-A Attending Nancy Scruggs MD   Hosp Day # 6 PCP Alessia Garrett MD     Chief Complaint:  RSV    S: Patient continues to Intravenous BID (Diuretic)   • Levothyroxine Sodium  225 mcg Oral Before breakfast   • ipratropium-albuterol  3 mL Nebulization 6 times per day   • atorvastatin  10 mg Oral Daily   • lisinopril  20 mg Oral Daily   • ferrous sulfate  325 mg Oral Daily with

## 2018-04-30 NOTE — CM/SW NOTE
Felipedaksha Madan lives in [place] [address] at [place] per RN~ [address] ordered upon discharge. CM/SW to follow.

## 2018-05-01 ENCOUNTER — PRIOR ORIGINAL RECORDS (OUTPATIENT)
Dept: OTHER | Age: 73
End: 2018-05-01

## 2018-05-01 NOTE — PROGRESS NOTES
SPO2% on Room Air at Rest 90%  SPO2% Room Air with ambulation 93%  SPO2% on O2 with ambulation.  93% on 0 Liters per minute

## 2018-05-01 NOTE — PROGRESS NOTES
[facility]  Progress Note    Zarina Patel Patient Status:  Inpatient    1945 MRN [id number]   [facility] 8NE-A Attending Prudencio Buerger, MD   [address] Day # 7 PCP Cynthia Knight MD     Subjective:  Zarina Patel is a(n) 67 year ol 17.1*   NEPRELIM  8.70*  7.49*   WBC  11.2  10.5   PLT  210.0  183.0     Recent Labs   Lab  04/29/18   0509  04/30/18   0442  05/01/18   0456   GLU  180*  153*  139*   BUN  25*  33*  33*   CREATSERUM  0.71  0.97  0.92   GFRAA  98  67  72   GFRNAA  85  59*

## 2018-05-01 NOTE — CM/SW NOTE
To discharge to [facility] at 5 pm per [employer] makerist. [address] [facility] [address]. Oxygen ordered through E. CM/SW to follow.

## 2018-05-01 NOTE — PROGRESS NOTES
NURSING DISCHARGE NOTE    Discharge order received. Report called to Bella James RN. Oxygen approved through [facility]. Discharged Other, (see nursing note) via Cart. Accompanied by Support staff  Belongings Taken by patient/family.     Pt PIV remov

## 2018-05-01 NOTE — PROGRESS NOTES
[facility]  Progress Note     Jennifer Tao Patient Status:  Inpatient    1945 MRN [id number]   [facility] 4SW-A Attending Virgle Snellen MD   Hosp Day # 7 PCP Marlon Khan MD     Chief Complaint:  RSV    S: Patient complains of (Diuretic)   • Levothyroxine Sodium  225 mcg Oral Before breakfast   • ipratropium-albuterol  3 mL Nebulization 6 times per day   • atorvastatin  10 mg Oral Daily   • lisinopril  20 mg Oral Daily   • ferrous sulfate  325 mg Oral Daily with breakfast   • Me

## 2018-05-01 NOTE — PLAN OF CARE
Problem: CARDIOVASCULAR - ADULT  Goal: Maintains optimal cardiac output and hemodynamic stability  INTERVENTIONS:  - Monitor vital signs, rhythm, and trends  - Monitor for bleeding, hypotension and signs of decreased cardiac output  - Evaluate effectivenes hyperglycemia and hypoglycemia  - Administer ordered medications to maintain glucose within target range  - Assess barriers to adequate nutritional intake and initiate nutrition consult as needed  - Instruct patient on self management of diabetes   Outcome applesauce as needed  - Discontinue feeding and notify MD (or speech pathologist) if coughing or persistent throat clearing or wet/gurgly vocal quality is noted   Outcome: Progressing      Problem: Impaired Functional Mobility  Goal: Achieve highest/safest

## 2018-05-01 NOTE — PROGRESS NOTES
05/01/18 0049 05/01/18 0050 05/01/18 0051   Oxygen Therapy   SpO2 (!) 85 % (!) 83 % (!) 84 %       05/01/18 0052 05/01/18 0053   Oxygen Therapy   SpO2 (!) 86 % (!) 86 %       Pt desaturating at night on RA.

## 2018-05-01 NOTE — WOUND PROGRESS NOTE
[facility]  Report of Inpatient Wound Care Progress Note    Lynn Raymundo Patient Status:  Inpatient    1945 MRN [id number]   [facility] 8NE-A Attending Nicolas Mahan MD   Hosp Day # 7 PCP Romel Garcia MD       SUBJECTIVE:  Nivia Olivera 25*   --   33*   --    --   33*   --    --    GLU  180*   --   153*   --    --   139*   --    --    CA  8.6   --   8.5   --    --   8.6   --    --    PGLU   --    < >   --    < >  173*   --   135*  179*    < > = values in this interval not displayed.

## 2018-05-02 NOTE — PROGRESS NOTES
Initial Post Discharge Follow Up   Discharge Date: 5/1/18  Contact Date: 5/2/2018    Consent Verification:  Assessment Completed With: Patient  HIPAA Verified?   Yes    Discharge Dx:  Acute on chronic hypoxic respiratory failure due to RSV bronchitis and 11   predniSONE 20 MG Oral Tab 2 pills a day for 7 days, then1 pill a day for 7 days and stop Disp: 21 tablet Rfl: 0   spironolactone 25 MG Oral Tab Take 1 tablet (25 mg total) by mouth BID (Diuretic).  Disp: 60 tablet Rfl: 1   ATORVASTATIN 10 MG Oral Tab T have any questions about your new medication? No  • Did you  your discharge medications when you left the hospital? Yes, No--prednisone and spironolactone have been picked up, however, nebulizer, neb meds, and breo have not been picked up yet.   Pt s Oma Ford  [address] [place], [facility] [place] [id number] [phone]          PCP TCM/HFU appointment: scheduled at D/C within 7-14 days  yes     NCM Reviewed/scheduled/rescheduled PCP TCM/HFU appo

## 2018-05-02 NOTE — TELEPHONE ENCOUNTER
Cele Dickson with [facility] said that she tried to  script for nebulizer medication and the pharmacy would not fill it because it was from hospitalist and not PCP. Rx for Ipratropium/Albuterol sent to [facility] per protocol.

## 2018-05-02 NOTE — DISCHARGE SUMMARY
[facility] HOSPITALIST  DISCHARGE SUMMARY     Melissa Ny Patient Status:  Inpatient    1945 MRN [id number]   [facility] 8NE-A Attending No att. providers found   Hosp Day # 7 PCP Galen Rocha MD     Date of Admission: 2018  Date comes to the nursing home and thought she should be taken to the emergency room. Patient states that she cannot really see her legs much but they feel more swollen than they normally do.   She thought she maybe had a low-grade fever over the weekend did ha pressures. Their recommendation was to remain off anticoagulation due to uterine bleeding will resume but will need to follow-up with cardiology for ultimate decision or if recurrence of bleeding.   Hemoglobin is been stable while in hospital    Patient' into the lungs daily. Rinse and spit after using   Quantity:  1 each  Refills:  11     ipratropium-albuterol 0.5-2.5 (3) MG/3ML Soln  Commonly known as:  DUONEB      Take 3 mL by nebulization 4 (four) times daily.    Quantity:  120 vial  Refills:  1     pre HYDROcodone-acetaminophen 5-325 MG Tabs  Commonly known as:  NORCO      Take 1-2 tablets by mouth every 4 (four) hours as needed for Pain.    Quantity:  30 tablet  Refills:  0     Hydrocortisone Acetate 25 MG Supp  Commonly known as:  ANUSOL-HC      Place appointment       Vital signs:  Temp:  [97.6 °F (36.4 °C)-98.1 °F (36.7 °C)] 98.1 °F (36.7 °C)  Pulse:  [72-86] 86  Resp:  [18-20] 18  BP: (136-152)/(57-78) 149/78    Physical Exam:    General: No acute distress.    Respiratory: Diminished at bases  Cardiov

## 2018-05-02 NOTE — PROGRESS NOTES
Tried to call the pt for TCM, recording came up stating, \"your call cannot be completed at this time, please try your call again later\". [facility] message sent to the pt for condition update.

## 2018-05-03 NOTE — TELEPHONE ENCOUNTER
Pt is scheduled for HFU appt with PCP in office on 5/7/18. Pt states she is usually seen at [facility] and is unable to come in to office for appt. Please advise.       TRIAGE:  Please confirm with PCP if he will be seeing pt at [facility] this week or next, and call p

## 2018-05-11 NOTE — PROGRESS NOTES
HPI:    Angelina Chau is a 67year old female here today for hospital follow up. She was discharged from Inpatient hospital, [facility] to Assisted living   Admit Date: 4/24  Discharge Date:5/1    Hospital Discharge Diagnosis:    1.  Acute on chron and possible pulmonary edema  Patient was aggressively treated while she was in the hospital  She was restarted on medications including diuretics, she was initially admitted to ICU and needed BiPAP, she was given IV steroids and neb treatments as well as 325 (65 FE) MG Oral Tab EC Take 1 tablet (325 mg total) by mouth daily with breakfast.   Rivaroxaban (XARELTO) 20 MG Oral Tab Take 1 tablet (20 mg total) by mouth daily with food.    Hydrocortisone Acetate 25 MG Rectal Suppos Place 1 suppository (25 mg tota abdominal pain, denies heartburn, denies diarrhea  MUSCULOSKELETAL: denies pain, normal range of motion of extremities  NEURO: denies headaches, denies dizziness, denies weakness  PSYCHE: denies depression or anxiety  HEMATOLOGIC: denies hx of anemia, or b patient  Emphasized her to use the Xarelto that was restarted    No orders of the defined types were placed in this encounter.       Meds & Refills for this Visit:  Signed Prescriptions Disp Refills    Cefadroxil 500 MG Oral Cap 20 capsule 0      Sig: Take

## 2018-05-17 ENCOUNTER — PRIOR ORIGINAL RECORDS (OUTPATIENT)
Dept: OTHER | Age: 73
End: 2018-05-17

## 2018-05-18 LAB
BUN: 33 MG/DL
BUN: 33 MG/DL
CALCIUM: 8.5 MG/DL
CALCIUM: 8.6 MG/DL
CHLORIDE: 95 MEQ/L
CHLORIDE: 95 MEQ/L
CREATININE, SERUM: 0.92 MG/DL
CREATININE, SERUM: 0.97 MG/DL
GLUCOSE: 139 MG/DL
GLUCOSE: 153 MG/DL
HEMATOCRIT: 42.6 %
HEMOGLOBIN: 13.1 G/DL
PLATELETS: 183 K/UL
POTASSIUM, SERUM: 3.8 MEQ/L
POTASSIUM, SERUM: 3.8 MEQ/L
RED BLOOD COUNT: 5.44 X 10-6/U
SODIUM: 140 MEQ/L
SODIUM: 141 MEQ/L
WHITE BLOOD COUNT: 10.5 X 10-3/U

## 2018-05-18 NOTE — TELEPHONE ENCOUNTER
Refill request sent from mail order for Potassium, Xarelto, and Colchicine. LOV 05/08/18, and BMP 5/1/18, CBC 04/30/18. Will you approve ? Routed to [employer].

## 2018-05-24 NOTE — TELEPHONE ENCOUNTER
LMOM for [facility] nurse that pt still has 1 refill on her spironolactone at [place].  C/b if any problems with that

## 2018-05-30 NOTE — PROGRESS NOTES
Outreached to patient in regards to CCM program. [facility] [phone]

## 2018-06-12 NOTE — PROGRESS NOTES
/74   Pulse 110   Temp 98 °F (36.7 °C) (Temporal)   Resp 18   Wt 284 lb   SpO2 97%   BMI 46.54 kg/m²               Patient presents with: Follow - Up       HPI;    Familia Contreras is a 67year old female.   Patient is here for follow-up, she was seen TARTRATE 50 MG Oral Tab TAKE 1 TABLET BY MOUTH  TWICE A DAY Disp: 180 tablet Rfl: 0   furosemide 40 MG Oral Tab Take 1 tablet by mouth  daily Disp: 90 tablet Rfl: 2   ferrous sulfate 325 (65 FE) MG Oral Tab EC Take 1 tablet (325 mg total) by mouth daily wi Diverticulitis of colon (without mention of hemorrhage)(562.11)    • Edema     chronic LE   • Infectious mononucleosis    • Other lymphedema     right arm   • Shortness of breath    • Unspecified sinusitis (chronic)       Social History:  Smoking status: N ordered in this encounter     No orders of the defined types were placed in this encounter.           Yaw Gibbs MD   [facility]  [address] [facility] [address]        Diabetes Care Dilated Eye Exam due on 12/19/1945  FIT Colorectal Sc

## 2018-06-13 NOTE — PROGRESS NOTES
Spoke with pt regarding CCM services and stated is interested but would like more info. Advised pt I would send info in mail and check back in a couple of weeks and verbalized understanding.

## 2018-07-26 NOTE — TELEPHONE ENCOUNTER
Abnormal UTI results received from [address] [place] [address]. Tod Zamorano at [facility] notified Abx order placed for Levofloxacin 500mg x7 days. Instructed to give OTC Probiotics also.

## 2018-07-26 NOTE — TELEPHONE ENCOUNTER
As per Shreya [address] from [address] - orders for the Antibiotic & the OTC probiotic needs to be faxed to them.

## 2018-07-26 NOTE — TELEPHONE ENCOUNTER
Antibiotic and Probiotic Rx sent to [facility]. Dr. Patience Renner order faxed to [facility] as requested.

## 2018-07-30 NOTE — PROGRESS NOTES
Spoke with pt regarding CCM info that was sent and stated did receive info but hasn't made a decision yet and has a lot of medical things on her plate.  Advised pt to call if she would like, otherwise I would try back in a couple of months and verbalized un

## 2018-08-02 PROBLEM — E87.2 METABOLIC ACIDOSIS: Status: ACTIVE | Noted: 2018-01-01

## 2018-08-02 PROBLEM — R79.89 AZOTEMIA: Status: ACTIVE | Noted: 2018-01-01

## 2018-08-02 PROBLEM — N17.9 ACUTE KIDNEY INJURY (HCC): Status: ACTIVE | Noted: 2018-01-01

## 2018-08-02 PROBLEM — E86.0 DEHYDRATION: Status: ACTIVE | Noted: 2018-01-01

## 2018-08-02 NOTE — ED INITIAL ASSESSMENT (HPI)
Pt from [facility] with [facility] labs high k, high creatine, pt has no c/o

## 2018-08-02 NOTE — ED PROVIDER NOTES
Patient Seen in: [facility] Emergency Department    History   Patient presents with:  Abnormal Result (metabolic, cardiac)    Stated Complaint: abd labs    HPI    This is a 61-year-old female who arrives here with no specific complaints she is from except as noted above.     Physical Exam   ED Triage Vitals  BP: (!) 122/93 [08/02/18 1339]  Pulse: 104 [08/02/18 1339]  Resp: 18 [08/02/18 1339]  Temp: 98 °F (36.7 °C) [08/02/18 1339]  Temp src: Oral [08/02/18 1832]  SpO2: 98 % [08/02/18 1339]  O2 Device: Hyaline Casts Present (*)     Mucous Urine 1+ (*)     All other components within normal limits   CBC W/ DIFFERENTIAL - Abnormal; Notable for the following:     RBC 5.12 (*)     MCV 76.4 (*)     MCH 24.0 (*)     RDW 18.3 (*)     RDW-SD 49.4 (*)     All oth diagnosis)  Hyperkalemia  Acute kidney injury  Disposition:  Admit  8/2/2018  4:48 pm    Follow-up:  No follow-up provider specified.       Medications Prescribed:  Current Discharge Medication List        Present on Admission  Date Reviewed: 8/16/2016

## 2018-08-02 NOTE — PROGRESS NOTES
[facility] Pharmacy Note:  Renal Dose Adjustment for Metoclopramide (REGLAN)    Russ Jackson has been prescribed metoclopramide (REGLAN) 10 mg q8hr prn. Estimated Creatinine Clearance: 22.3 mL/min (A) (based on SCr of 2.05 mg/dL (H)).     Her calculated crea

## 2018-08-02 NOTE — H&P
[facility] HOSPITALIST  History and Physical     Melissa Yanely Patient Status:  Emergency    1945 MRN [id number]   Location [address] [facility] Attending Bessie Willis MD   Hosp Day # 0 PCP Galen Rocha MD     Chief Complain Mother    • Other Radha Gentle Mother    • HTN Radha Gentle Mother    • Asthma Maternal Grandmother        Allergies:   Adhesive Tape             Iodine (Topical)        ITCHING    Medications:    No current facility-administered medications on file prior to encount comprehensive 14 point review of systems was completed. Pertinent positives and negatives noted in the HPI.     Physical Exam:    BP (!) 122/93   Pulse 104   Temp 98 °F (36.7 °C)   Resp 18   Ht 165.1 cm (5' 5\")   Wt 265 lb (120.2 kg)   SpO2 98%   BMI 44 Prophylaxis: HSQ  · CODE status: Full  · [place]: none    Plan of care discussed with patient, ED physician    Clark Valderrama MD  8/2/2018

## 2018-08-03 NOTE — PROGRESS NOTES
[facility] HOSPITALIST  Progress Note     Kimberly Diaz Patient Status:  Inpatient    1945 MRN [id number]   [facility] 2NE-A Attending Agata Delgadillo MD   Hosp Day # 1 PCP Lennie Cantu MD     Chief Complaint: abnormal labs    S: Patient Daily(Beta Blocker)   • Heparin Sodium (Porcine)  5,000 Units Subcutaneous Q8H [address]   • Levothyroxine Sodium  225 mcg Oral Before breakfast   • levofloxacin  750 mg Intravenous Q48H       ASSESSMENT / PLAN:     1. Acute Kidney Injury  1.  Continue hydration

## 2018-08-03 NOTE — PLAN OF CARE
Patient main concern for eye drops that are to be started today, MD aware and ordered them  IVF infusing  Creatinine 1.56 today, baseline less than 1  Urine culture pending  Room air  afib rate 100's, BP improved today  Denies pain  Up with 1 assist and wa

## 2018-08-03 NOTE — CM/SW NOTE
08/03/18 1050   CM/SW Screening   Referral [facility] staff; Chart review;Nursing rounds   Patient's Mental Status Alert;Oriented   Patient's [address] Name Patel   Patient S

## 2018-08-03 NOTE — PROGRESS NOTES
[facility] Pharmacy Note:  Renal Adjustment for levofloxacin [facility])    Shar Ray is a 67year old female who has been prescribed levofloxacin (LEVAQUIN) 750 mg every 24 hrs.       CrCl is estimated creatinine clearance is 22.3 mL/min (A) (based on SCr of

## 2018-08-04 NOTE — CM/SW NOTE
Pt ready for DC. sw arranged [facility] at pt request. [facility] arranged for 12:15pm. RN updated. Spring perez notified of DC.  Pending sale to [facility] notified of pts DC today

## 2018-08-04 NOTE — PROGRESS NOTES
[facility] HOSPITALIST  Progress Note     Lynn Raymundo Patient Status:  Inpatient    1945 MRN [id number]   [facility] 2NE-A Attending Sahra Ko MD   Hosp Day # 2 PCP Romel Garcia MD     Chief Complaint: abnormal labs    S: Patient reviewed in Epic.     Medications:   • atorvastatin  10 mg Oral Nightly   • Metoprolol Tartrate  50 mg Oral 2x Daily(Beta Blocker)   • Heparin Sodium (Porcine)  5,000 Units Subcutaneous Q8H [facility]   • Levothyroxine Sodium  225 mcg Oral Before breakfast   • lev

## 2018-08-04 NOTE — PLAN OF CARE
NURSING DISCHARGE NOTE    Discharged Other, (see nursing note) via Wheelchair. Accompanied by Support staff  Belongings Taken by patient/family.     medicar transporting to [address] and iv removed  Discharge AVS reviewed with patient   No n

## 2018-08-04 NOTE — DISCHARGE SUMMARY
[facility] PSYCHIATRIC [place] HOSPITALIST  DISCHARGE SUMMARY     Jae Lebron Patient Status:  Inpatient    1945 MRN [id number]   [facility] [place] 2NE-A Attending Chasidy Smith MD   Hosp Day # 2 PCP Michael Ortez MD     Date of Admission: 2018  Date of Dis atorvastatin 10 MG Tabs  Commonly known as:  LIPITOR      Take 10 mg by mouth nightly. Refills:  0     besifloxacin HCl 0.6 % Susp  Commonly known as:  BESIVANCE      1 drop in surgery eye TID starting 3 days before surgery. Continue as directed.    Ref

## 2018-08-06 NOTE — PROGRESS NOTES
18    No chief complaint on file.       HPI;-Zenaida Swan  : 1945  Age 67year old  female patient is seen here at for follow-up in her apartment  she was seen about a month ago   she is doing well and denies any complaints  She has recent soft, nondistended    extremities- 3+ bilateral pedal edema  PSYCHIATRIC: awake, oriented x 3Cranial nerves II through XII intact. Normal speech. Good strength throughout.  Nonfocal.      Diagnoses and all orders for this visit:    Prerenal azotemia    Hype

## 2018-08-07 NOTE — CM/SW NOTE
08/03/18 1500   Discharge disposition   Expected discharge disposition Home-Health   Name of Facillity/Home Care/Hospice Francisco Amaral Provider [address]   [facility])     avs and d/c summary sent to [facility].

## 2018-08-07 NOTE — PROGRESS NOTES
HPI:    Je More is a 67year old female here today for hospital follow up.    She was discharged from Inpatient hospital, [facility] to Assisted living   Admit Date: 8/2  Discharge Date: 8/4  Hospital Discharge Diagnosis:     Discharge Diagnosi Levothyroxine Sodium 200 MCG Oral Tab Take 200 mcg by mouth before breakfast.   Metoprolol Tartrate 50 MG Oral Tab Take 50 mg by mouth 2 (two) times daily.    ammonium lactate (AMLACTIN) 12 % External Lotion Apply 1 Application topically 2 (two) times spenser diarrhea  MUSCULOSKELETAL: denies pain, normal range of motion of extremities  NEURO: denies headaches, denies dizziness, denies weakness  PSYCHE: denies depression or anxiety  HEMATOLOGIC: denies hx of anemia, or bruising, denies bleeding  ENDOCRINE: jordan minutes.   Dehydration  Medications updated in the chart  Hyperkalemia  Recheck labs  Paroxysmal atrial fibrillation (HCC)  Hypothyroidism, unspecified type  Cataract of both eyes, unspecified cataract type  Patient needs clearance for cataract surgery  Unf

## 2018-08-14 NOTE — PROGRESS NOTES
/86   Pulse 76   Temp 97.6 °F (36.4 °C) (Temporal)   Resp 18   SpO2 98%               Patient presents with:  Test Results: follow up needed for clearance for surgery       HPI;    Phoebe Coello is a 67year old female.   Patient is here today for c can only see light and dark   • Diverticulitis of colon (without mention of hemorrhage)(562.11)    • Edema     chronic LE   • Infectious mononucleosis    • Osteoarthritis    • Other lymphedema     right arm   • Shortness of breath    • Unspecified sinusiti understanding of these issues and agrees to the plan.   Imaging & Consults:  None  Meds & Refills for this Visit:  Signed Prescriptions Disp Refills    Metoprolol Tartrate 50 MG Oral Tab 270 tablet 3      Sig: Take 1 tablet (50 mg total) by mouth 3 (three)

## 2018-08-28 NOTE — TELEPHONE ENCOUNTER
Call from nurse at [facility] with condition update. [facility] nurse states patient had some vaginal bleeding. Patient states this is not a new occurrence and has been occurring on and off for past 3 years. Has spotting on toilet tissue.  States Dr. Mundo Davidson is a

## 2018-09-04 NOTE — TELEPHONE ENCOUNTER
LOV 8/18    LAST LAB    LAST RX   SPIRONOLACTONE 25 MG Oral Tab (Discontinued) 120 tablet 1 7/12/2018         Next OV Visit date not found    PROTOCOL    Hypertension Medications Protocol Failed8/30 3:54 AM   Appointment in past 6 or next 3 months     Pl

## 2018-09-06 ENCOUNTER — PRIOR ORIGINAL RECORDS (OUTPATIENT)
Dept: OTHER | Age: 73
End: 2018-09-06

## 2018-09-06 NOTE — TELEPHONE ENCOUNTER
Dr. Cleo Gonzalez, [address] call from Kolton Fitzpatrick at [facility]. He states patient is requesting to be transferred to the ED. She is complaining of nausea and abdominal pain. \"she can't take it anymore.   Kolton Fitzpatrick states patient is a reliable historian and patie

## 2018-09-06 NOTE — ED PROVIDER NOTES
Patient Seen in: [facility] Emergency Department    History   Patient presents with:  Abdomen/Flank Pain (GI/)    Stated Complaint: abd pain    HPI    80-year-old female presents emergency room with chief complaint of abdominal pain.   Patient has and vital signs reviewed. All other systems reviewed and negative except as noted above.     Physical Exam   ED Triage Vitals  BP: 157/73 [09/06/18 1012]  Pulse: 89 [09/06/18 1012]  Resp: 20 [09/06/18 1012]  Temp: 97.8 °F (36.6 °C) [09/06/18 1012]  Tem URINE 3-5 (*)     Squamous Epi.  Cells Moderate (*)     Hyaline Casts Present (*)     Mucous Urine 2+ (*)     All other components within normal limits   LIPASE - Abnormal; Notable for the following:     Lipase 55 (*)     All other components within normal treatment, or admission on an emergency basis. Comprehensive verbal and written discharge and follow-up instructions were provided to help prevent relapse or worsening.   Patient was instructed to follow-up with primary care provider for further evaluation

## 2018-09-11 PROBLEM — R19.07 GENERALIZED ABDOMINAL MASS: Status: ACTIVE | Noted: 2018-01-01

## 2018-09-13 ENCOUNTER — PRIOR ORIGINAL RECORDS (OUTPATIENT)
Dept: OTHER | Age: 73
End: 2018-09-13

## 2018-09-24 ENCOUNTER — PRIOR ORIGINAL RECORDS (OUTPATIENT)
Dept: OTHER | Age: 73
End: 2018-09-24

## 2018-09-24 NOTE — H&P
[facility]    PATIENT'S NAME: Leon Del Castillo   ATTENDING PHYSICIAN: Manpreet Montez M.D.    PATIENT ACCOUNT#:   [de-identified]    LOCATION:  OR   [facility]  MEDICAL RECORD #:   [id number]       YOB: 1945  ADMISSION DATE:       09/25/2018    H bleeding. 3.   Bilateral pulmonary nodules. PLAN:  Medical and cardiac clearance. If fit, exploratory laparotomy, removal of the mass, hysterectomy, bilateral salpingo-oophorectomy, frozen section. If any malignancy, staging procedure.      Dictated B

## 2018-09-25 PROBLEM — R19.00 PELVIC MASS: Status: ACTIVE | Noted: 2018-01-01

## 2018-09-25 NOTE — PROGRESS NOTES
[facility] Pharmacy Progress Note:  Anticoagulation Weight Dose Adjustment for enoxaparin (Nicolas Cotton)    Jennifer Tao is a 67year old female who has been prescribed enoxaparin (LOVENOX) 40mg daily for VTE prophylaxis.       Est CrCl: >60 mL/min based on weight a

## 2018-09-25 NOTE — ANESTHESIA PREPROCEDURE EVALUATION
PRE-OP EVALUATION    Patient Name: Susana Nicholson    Pre-op Diagnosis: PELVIC MASS    Procedure(s):  EXPLORATORY LAPAROTOMY, REMOVAL OF MASS, FROZEN SECTION, TOTAL ABDOMINAL HYSTERECTOMY, BILATERAL SALPINGO-OOPHORECTOMY  POSSIBLE STAGING PROCEDURE    Surg Iodinated Dyes; Adhesive Tape      Anesthesia Evaluation    Patient summary reviewed. Anesthetic Complications  (-) history of anesthetic complications         GI/Hepatic/Renal    Negative GI/hepatic/renal ROS.                              Cardiovascular Dental             Pulmonary    Pulmonary exam normal.                 Other findings            ASA: 3   Plan: general  NPO status verified and patient meets guidelines.           Plan/risks discussed with: patient and Other                Present on Adm

## 2018-09-25 NOTE — PROGRESS NOTES
[facility]  Brief Op Note    Tamea Sick Location: OR   CSN [id number] MRN [id number]   Admission Date 9/25/2018 Operation Date 9/25/2018   Attending Physician Crow Cooper MD Operating Physician Td Zarate MD     Pre-Operative Diagnosi

## 2018-09-26 NOTE — PROGRESS NOTES
[facility] Pharmacy Note:  Renal Dose Adjustment for Enoxaparin (LOVENOX)    Melissa Ny has been prescribed Enoxaparin (LOVENOX) 60 (0.5mg/kg) subcutaneously every 24 hours. Estimated Creatinine Clearance: 29.3 mL/min (A) (based on SCr of 1.56 mg/dL (H)).

## 2018-09-26 NOTE — PROCEDURES
[facility]  Procedure Note    Edwyna Dinh Patient Status:  Inpatient    1945 MRN [id number]   [facility] Claudio [facility]gins MD   [facility] Day # 1 PCP Marlon Khan MD     Procedure: PICC Placement

## 2018-09-26 NOTE — PROGRESS NOTES
Vascular access RN evaluated patient's right arm for PICC line. Vascular access RN unable to find vessel large enough for 5 [place]. Vascular access RN asked another Vascular access RN Lio Drummond to visualize veins.  Brittany Dumont stated she felt the same way and the

## 2018-09-26 NOTE — PLAN OF CARE
CARDIOVASCULAR - ADULT    • Maintains optimal cardiac output and hemodynamic stability Not Progressing        GASTROINTESTINAL - ADULT    • Maintains or returns to baseline bowel function Not Progressing          CARDIOVASCULAR - ADULT    • Absence of card

## 2018-09-26 NOTE — CONSULTS
[facility]  Report of Consultation    Alejandro Chisholm Patient Status:  Inpatient    1945 MRN [id number]   [facility] 4SW-A Attending Mena Olivas MD   [facility] Day # 1 PCP Ángel Centeno MD     Reason for Consultation: Madhu Dalton hypertension  · Hyperlipidemia    Past Surgical History:  1996: BIOPSY/REMOVAL, LYMPH NODE(S)  11/9/01: COLONOSCOPY      Comment:  (fiberoptic) Syl , MD  repeat 5 yrs  1996: LUMPECTOMY RIGHT      Comment:  RT.LUMPECTOMY 1996  No date: MIMI BIOPSY S Skin. Disp:  Rfl:  9/24/2018 at 0900     Review of Systems: A general review of systems is unremarkable except as mentioned above.     Vital signs:  /52   Pulse 87   Temp 97.6 °F (36.4 °C) (Temporal)   Resp 14   Ht 5' 5\" (1.651 m)   Wt 285 lb 9.6 oz OZRJP2P, ABGHCO3, ABGBE, TEMP, JUSTINE, SITE, DEV, THGB in the last 168 hours.     Invalid input(s): [id number], CHOB      Cultures: No new/positive culture results    Radiology: Chest x-ray with what appear to be pulmonary nodules that are new since May of thi

## 2018-09-26 NOTE — PLAN OF CARE
GASTROINTESTINAL - ADULT    • Achieves appropriate nutritional intake (bariatric) Not Progressing          GASTROINTESTINAL - ADULT    • Maintains or returns to baseline bowel function Progressing        GENITOURINARY - ADULT    • Absence of urinary retent

## 2018-09-26 NOTE — CONSULTS
[facility] Patient Status:  Inpatient    1945 MRN [id number]   [facility] 4SW-A Attending Allie Power MD   Hosp Day # 0 PCP Rivka Fleming MD     Reason for consult: Medical management     R Family History   Problem Relation Age of Onset   • Breast Cancer Self    • Arthritis Father    • Other (Other) Father    • Other (CAD) Father    • Other (HTN) Father    • Arthritis Mother    • Other (Other) Mother    • Other (HTN) Mother    • Asthma Mate lethargic. HEENT: Normocephalic atraumatic. Moist mucous membranes. EOM-I. PERRLA. Neck: No lymphadenopathy. Respiratory: Clear to auscultation bilaterally. No wheezes. No rhonchi.   Cardiovascular: S1, S2. IR IR , tachy  Chest and Back: No tenderness illness, I anticipate that, after discharge, patient will require TBD.

## 2018-09-26 NOTE — PROGRESS NOTES
ICU  Critical Care APRN H & P    NAME: Brianda Deshpande Ave: [id number] - MRN: [id number] - Age: 67year old - :1945    History Of Present Illness:  Familia Contreras is a 67year old female with PMHx significant for Afib, breast cancer, diverticuli systems was completed. Pertinent positives and negatives noted in the HPI.     OBJECTIVE  Vitals:  BP 96/45   Pulse 79   Temp 97 °F (36.1 °C) (Temporal)   Resp 17   Ht 165.1 cm (5' 5\")   Wt 282 lb 3 oz (128 kg)   SpO2 98%   BMI 46.96 kg/m²     Physical Ex oophorectomy, terminal ileum resection with side to side anastomosis    # Afib - rate controlled  # Pain - Toradol, Tylenol    F/E/N:    NPO  Proph:  -No a/c at this time d/t evidence of anemia, postop, start when ok with surgery  -SCD    Dispo:   -Full co

## 2018-09-26 NOTE — PROGRESS NOTES
[facility]  Progress Note    Kimberly Emily Patient Status:  Inpatient    1945 MRN [id number]   [facility] 4SW-A Attending Jacey Mckee MD   [facility] Day # 1 PCP Lennie Cantu MD       SUBJECTIVE:  Comfortable  Only complaint o (AMBIEN) tab 5 mg 5 mg Oral Nightly PRN   ondansetron HCl (ZOFRAN) injection 4 mg 4 mg Intravenous Q8H PRN   Or      Ondansetron HCl (ZOFRAN) tab 4 mg 4 mg Oral Q8H PRN   Prochlorperazine Maleate (COMPAZINE) tab 10 mg 10 mg Oral Q12H PRN   Or      prochlor

## 2018-09-26 NOTE — CM/SW NOTE
Attempted to respond to SW order for discharge planning. Pt currently asleep with lights out for rest.    POD #1 S/P FERNANDA and BSO and colon resection for large malignant pelvic mass. Currently still on IV levo.     Per case finding, pt has HCPOA on file in

## 2018-09-26 NOTE — PROGRESS NOTES
[facility]ITAL[facility]  Progress Note     Kolton Getting Patient Status:  Inpatient    1945 MRN [id number]   [facility] 4SW-A Attending Nancy Willams MD   [address] Day # 1 PCP Kain Castañeda MD     Chief Complaint: Medical management    S Sodium  225 mcg Oral QAM AC   • atorvastatin  10 mg Oral Nightly   • Pantoprazole Sodium  40 mg Oral QAM AC   • Insulin Aspart Pen  1-5 Units Subcutaneous 4 times per day       ASSESSMENT / PLAN:     1. Pelvic mass s/p FERNANDA POD 0 with small bowel resection.

## 2018-09-26 NOTE — PROGRESS NOTES
Pt received from the OR. Pt is groggy but awakened quickly. Morphine given which assisted with pt's pain. Abdominal binder in place. NG to LIS. Packed cells infusing. Called Dr. Victorina Malagon and Jarvis Benoit for orders. Monitoring BP.  Will continue to monitor in the IC

## 2018-09-27 NOTE — PROGRESS NOTES
[facility]  Progress Note    Trina Fairchild Patient Status:  Inpatient    1945 MRN [id number]   [facility] 4SW-A Attending Rani Cheng MD   [facility] Day # 2 PCP Maya Lord MD       SUBJECTIVE:  Comfortable with minimal pain Sodium (LOVENOX) 40 MG/0.4ML injection 40 mg 40 mg Subcutaneous Daily   Pantoprazole Sodium (PROTONIX) 40 mg in Sodium Chloride 0.9 % 10 mL IV push 40 mg Intravenous Q24H   Or      Pantoprazole Sodium (PROTONIX) EC tab 40 mg 40 mg Oral Q24H   Zolpidem Tart

## 2018-09-27 NOTE — PROGRESS NOTES
AM Hgb 6.2, no active bleeding noted. Hemodynamics are stable.   Dilution likely contributing to drop from 8.4 yesterday morning, but patient is just over one day post-op urgent exploratory laparotomy with removal of the pelvic mass, FERNANDA/BSO with frozen se

## 2018-09-27 NOTE — PROGRESS NOTES
[facility]  Progress Note    Angelina Chau Patient Status:  Inpatient    1945 MRN [id number]   [facility] 4SW-A Attending Marin Cline MD   [address] Day # 2 PCP Dulce Maria Gillette MD     Subjective:  Angelina Chau is a(n) 72 year 3.65*  2.62*   HGB  7.6*  8.4*  8.4*  6.2*   HCT  26.3*   --   28.4*  20.4*   MCV  77.4*   --   77.8*  77.9*   MCH  22.4*   --   23.0*  23.7*   MCHC  28.9*   --   29.6*  30.4*   RDW  18.0*   --   17.7*  18.0*   NEPRELIM  6.96*   --    --   4.12   WBC  9.2 hypertension  11. Hyperlipidemia  12. Iron deficiency anemia     Plan: Reduce fluids, administer diuretics, replete hemoglobin. Continue to follow closely. Prognosis guarded. Vinod Lemons SR.  Crit care35  9/27/2018  7:46 AM

## 2018-09-27 NOTE — PROGRESS NOTES
[facility]  Progress Note     Ashley Gravely Patient Status:  Inpatient    1945 MRN [id number]   [facility] 4SW-A Attending Roberto Flores MD   [address] Day # 2 PCP Mayank Joshi MD     Chief Complaint: medical management    S Intravenous Once   • Albumin Human  25 g Intravenous Q6H   • iron sucrose  200 mg Intravenous Daily   • Miconazole Nitrate   Topical [email or web address]   • enoxaparin  40 mg Subcutaneous Daily   • pantoprazole (PROTONIX) IV push  40 mg Intravenous Q24H    Or

## 2018-09-27 NOTE — CM/SW NOTE
09/27/18 1500   CM/SW Referral Data   Referral Source Physician   Reason for Referral Discharge planning   Informant Patient   Patient Info   Patient's Mental Status Alert;Oriented   Patient's [address] Name [place]

## 2018-09-27 NOTE — OPERATIVE REPORT
[facility]    PATIENT'S NAME: Ella Kauffman   ATTENDING PHYSICIAN: John Saeed M.D. OPERATING PHYSICIAN: John Saeed M.D.    PATIENT ACCOUNT#:   [de-identified]    LOCATION:  [address] [place] [address]  MEDICAL RECORD #:   [id number]       DATE OF CYRUS ligament was isolated, clamped, and ligated. Then, similar dissection was performed on the left side, again by clamping and coagulating the round ligament, opening the anterior leaf of the broad ligament, and dissection was performed.   The retroperitoneal tumor on the whole mesentery of the small bowel that was diseased and was resected. There was no residual tumor on the rest of the small bowel. Then, a partial infracolic omentectomy was performed.   The frozen section report revealed this to be a high-gr

## 2018-09-28 NOTE — PROGRESS NOTES
[facility]  Progress Note     Kolton Getting Patient Status:  Inpatient    1945 MRN [id number]   [facility] 4SW-A Attending Nancy Willams MD   [facility] Day # 3 PCP Kain Castañeda MD     Chief Complaint: medical management    S results for input(s): PTP, INR in the last 168 hours. No results for input(s): TROP, CK in the last 168 hours. Imaging: Imaging data reviewed in Epic.     Medications:   • metoprolol Tartrate  5 mg Intravenous Q6H   • furosemide  40 mg Intravenou

## 2018-09-28 NOTE — OCCUPATIONAL THERAPY NOTE
OCCUPATIONAL THERAPY EVALUATION - INPATIENT     Room Number: 472/472-A  Evaluation Date: 9/28/2018  Type of Evaluation: Initial  Presenting Problem: large abdominopelvic mass s/p ex-lap, hyserectomy w/ bilateral salpingo-oophorectomy, resection of the term Shortness of breath    • Unspecified sinusitis (chronic)        Past Surgical History  Past Surgical History:  9/25/2018: ABDOMINAL HYSTERECTOMY TOTAL BSO STAGING; Bilateral      Comment:  Performed by Sulma Gomez MD at [facility] 109: BIOPSY/SALLY different toileting tools but nothing has worked. Patient self-stated goal is to go home and be able to care for herself and her cat. To be able to return to her private house eventually.  (has not been in her home for 2 1/2 years)     OBJECTIVE  Precau currently need…  -   Putting on and taking off regular lower body clothing?: A Lot  -   Bathing (including washing, rinsing, drying)?: A Lot  -   Toileting, which includes using toilet, bedpan or urinal? : A Lot  -   Putting on and taking off regular upper These deficits impact the patient’s ability to participate in self-care, functional mobility, instrumental activities of daily living, rest and sleep, work, leisure and social participation.      The patient is functioning below her previous functional leve Exercise Program Goal  Patient will be independent with bilateral AROM HEP (home exercise program).

## 2018-09-28 NOTE — PLAN OF CARE
Problem: Impaired Swallowing  Goal: Minimize aspiration risk  Interventions:  - Patient should be alert and upright for all feedings (90 degrees preferred)  - Offer food and liquids at a slow rate  - Small single sips from straw ok  - Encourage small bites

## 2018-09-28 NOTE — PHYSICAL THERAPY NOTE
PHYSICAL THERAPY EVALUATION - INPATIENT     Room Number: 472/472-A  Evaluation Date: 9/28/2018  Type of Evaluation: Initial  Physician Order: PT Eval and Treat    Presenting Problem: 9/25 - Abdominal hysterectomy, total BSO, removal of pelvic mass  R History:  9/25/2018: ABDOMINAL HYSTERECTOMY TOTAL BSO STAGING; Bilateral      Comment:  Performed by Tj Albarran MD at [facility] 109: BIOPSY/REMOVAL, LYMPH NODE(S)  11/9/01: COLONOSCOPY      Comment:  (fiberoptic) Oumou Soriano MD  repeat 5 yrs WFL - within functional limits    RANGE OF MOTION AND STRENGTH ASSESSMENT  Upper extremity ROM and strength - see OT eval.  B shoulders very restricted.     Lower extremity ROM is within functional limits     Lower extremity strength is within functional li Assistance: Dependent assistance(Based on FIM scale per dept protocol)  Distance (ft): 5'x2  Assistive Device: Other (Comment)(Pt's own rollator)  Pattern: (Lateral sway, decreased foot clearance)          Skilled Therapy Provided: Pt completed supine to s [facility]. The AM-KATIE ' '6-Clicks' Inpatient Daily Activity Short Form was completed and  this patient  is demonstrating a  61% degree impairment in activities of daily living.  Research supports that patients with this level of impairment often benefit fro

## 2018-09-28 NOTE — PROGRESS NOTES
[facility]  Progress Note    Manisha Miguel Patient Status:  Inpatient    1945 MRN [id number]   [facility] 4SW-A Attending Allie Power MD   [facility] Day # 3 PCP Rivka Fleming MD     Subjective:  Manisha Miguel is a(n) 72 year 09/27/18   0504  09/27/18   1221  09/27/18   2147  09/28/18   0500   RBC  3.40*   --   3.65*  2.62*   --    --   3.46*   HGB  7.6*   < >  8.4*  6.2*  7.2*  8.3*  8.3*   HCT  26.3*   --   28.4*  20.4*   --    --   27.6*   MCV  77.4*   --   77.8*  77.9*   -- possibly metastatic lesions  5. Severe obesity  6. Chronic atrial fibrillation  7. Chronic lower extremity lymphedema  8.  Chronic right upper extremity lymphedema in association with a distant history of breast CA treated with lumpectomy and follow-up radi

## 2018-09-28 NOTE — PLAN OF CARE
Pt. Remains on NC at 2L. Does deep breath and cough. Does IS up to 750cc's. Sat up in chair twice and tolerated well. Worked with PT today. Medicated twice for pain with good results. Has voided several times since light was removed. Is passing gas.

## 2018-09-28 NOTE — SLP NOTE
ADULT SWALLOWING EVALUATION    ASSESSMENT    ASSESSMENT/OVERALL IMPRESSION:  B/S swallow eval completed upon receipt of MD order.   Per MD note  History of Present Illness:  Kimberly Diza is a a(n) 67year old female who underwent exploratory laparotomy b Dysphagia therapy  Discharge Recommendations/Plan: Undetermined    HISTORY   MEDICAL HISTORY  Reason for Referral: R/O aspiration    Problem List  Active Problems:    Essential hypertension    Essential hypertension, benign    Type 2 diabetes mellitus with metastases. Right axillary surgical clips. Osseous structures are stable including diffuse degenerative change. SUBJECTIVE   Pt alert and eager to drink cold water. OBJECTIVE   ORAL MOTOR EXAMINATION  Dentition: Natural;Functional  Symmetry:  Wi

## 2018-09-28 NOTE — PLAN OF CARE
Diabetes/Glucose Control    • Glucose maintained within prescribed range Not Progressing        RESPIRATORY - ADULT    • Achieves optimal ventilation and oxygenation Not Progressing          CARDIOVASCULAR - ADULT    • Maintains optimal cardiac output and

## 2018-09-29 NOTE — PROGRESS NOTES
[facility]  Progress Note    Lynn Raymundo Patient Status:  Inpatient    1945 MRN [id number]   [facility] 4SW-A Attending Doug Tucker MD   [facility] Day # 4 PCP Romel Garcia MD       SUBJECTIVE:  Complaining of a little more (CHLORASEPTIC) 1.4 % oral liquid spray 1-5 spray 1-5 spray Oral Q2H PRN   morphINE sulfate (PF) 4 MG/ML injection 1 mg 1 mg Intravenous Q2H PRN   Or      morphINE sulfate (PF) 4 MG/ML injection 2 mg 2 mg Intravenous Q2H PRN   Enoxaparin Sodium (LOVENOX) 40

## 2018-09-29 NOTE — PLAN OF CARE
Patient transferred from ICU around 1530. A/o x 4, 2L > 92%, a-fib on tele monitor w/ controlled rates. Abdominal binder in place, redness noted to groin, dressing on sacrum. All needs met, patient updated. Will monitor.

## 2018-09-29 NOTE — PROGRESS NOTES
[facility]  Progress Note    Angela Umana Patient Status:  Inpatient    1945 MRN [id number]   [facility] 4SW-A Attending Althea Castano MD   [facility] Day # 4 PCP Rissa Henry MD     Subjective:  Angela Umana is a(n) 72 year HGB  6.2*   < >  8.3*  8.3*  8.1*   HCT  20.4*   --    --   27.6*  26.8*   MCV  77.9*   --    --   79.8*  80.5*   MCH  23.7*   --    --   24.0*  24.3*   MCHC  30.4*   --    --   30.1*  30.2*   RDW  18.0*   --    --   17.9*  18.5*   NEPRELIM  4.12   -- lumpectomy and follow-up radiation therapy  9. History of hypothyroidism, status post thyroidectomy, on repletion therapy  10. Essential hypertension  11. Hyperlipidemia  12.  Iron deficiency anemia     Plan:   Transition back on home PO metoprolol and wean

## 2018-09-29 NOTE — PROGRESS NOTES
[facility]  Progress Note     Lynda Dhaliwal Patient Status:  Inpatient    1945 MRN [id number]   [facility] 4SW-A Attending Lilibeth Ridley MD   [facility] Day # 4 PCP Yaw Gibbs MD     Chief Complaint: medical management    S 5. 0*  4.5*       Estimated Creatinine Clearance: 71.5 mL/min (based on SCr of 0.64 mg/dL). No results for input(s): PTP, INR in the last 168 hours. No results for input(s): TROP, CK in the last 168 hours.          Imaging: Imaging data reviewed in Epi

## 2018-09-29 NOTE — PLAN OF CARE
Pt. Sat up in chair for 5 hours and tolerated well. O2 at [facility]. Diet advanced. Does IS and deep breathing exercises. Ambulates with walker. Voiding qs per commode. Transferred to [facility] [phone] in bariatric bed with belongings.   Accompanied by nurse and friend

## 2018-09-30 NOTE — PLAN OF CARE
Alert and oriented. VSS. Weaned down to 1L NC. Ambulated to commode with walker, SOB with activity. Controlled afib on monitor. PRN Morphine given for abd pain. Abd incision dressing dry and intact, binder in place.  Pt safety maintained, will continue to

## 2018-09-30 NOTE — PROGRESS NOTES
[facility]  Progress Note    Jae Lebron Patient Status:  Inpatient    1945 MRN [id number]   [facility] 4SW-A Attending Jimena Bolton MD   [facility] Day # 5 PCP Michael Ortez MD     Subjective:  Jae Lebron is a(n) 72 year --   3.46*  3.33*  4.03   HGB  6.2*   < >  8.3*  8.1*  9.8*   HCT  20.4*   --   27.6*  26.8*  32.9*   MCV  77.9*   --   79.8*  80.5*  81.6   MCH  23.7*   --   24.0*  24.3*  24.3*   MCHC  30.4*   --   30.1*  30.2*  29.8*   RDW  18.0*   --   17.9*  18.5*  18 right upper extremity lymphedema in association with a distant history of breast CA treated with lumpectomy and follow-up radiation therapy  9. History of hypothyroidism, status post thyroidectomy, on repletion therapy  10. Essential hypertension  11.  Hype

## 2018-09-30 NOTE — PROGRESS NOTES
[facility]  Progress Note     Mariano Hendricks Patient Status:  Inpatient    1945 MRN [id number]   [facility] 4SW-A Attending Izella Boas, MD   [facility] Day # 5 PCP Jorge Alberto Mobley MD     Chief Complaint: medical management    S ALT  <6*  <6*  <6*   --    BILT  0.6  1.5  2.0   --    TP  4.7*  5.0*  4.5*   --        Estimated Creatinine Clearance: 71.5 mL/min (based on SCr of 0.64 mg/dL). No results for input(s): PTP, INR in the last 168 hours.     No results for input(s): TROP

## 2018-09-30 NOTE — SLP NOTE
SPEECH DAILY NOTE - INPATIENT    ASSESSMENT & PLAN   ASSESSMENT  Pt seen for meal assess/dysphagia therapy to monitor po tolerance of recommended diet and ensure adherence to aspiration precautions. Pt alert and sitting upright in chair.   Denies any signi FOLLOW UP  Follow Up Needed: Yes  SLP Follow-up Date: 10/01/18  Number of Visits to Meet Established Goals: (1-2)    Session: 1    If you have any questions, please contact Luis E Carrillo.  Chiquis Merrill M.S.,CCC-SLP  Speech Language Patholog

## 2018-10-01 NOTE — CM/SW NOTE
Interdisciplinary Rounds: 10/01/18  Admitted: 9/25/2018 LOS: 6  Disciplines in attendance: charge nurse, staff nurse, CM, Rene 56 and discharge plan reviewed.     Outcome/issues identified: anticipate d/c today    Discharge Plan: updated therapy note [phone]  Pager [phone]

## 2018-10-01 NOTE — SLP NOTE
SPEECH DAILY NOTE - INPATIENT    ASSESSMENT & PLAN   ASSESSMENT  Pt seen for meal assess/dysphagia therapy to monitor po tolerance of recommended diet and ensure adherence to aspiration precautions. Pt alert and sitting upright in chair.   Pt observed self M.S.,[facility]-SLP  Speech Language Pathologist

## 2018-10-01 NOTE — PROGRESS NOTES
[facility]  Progress Note    Amy Antonio Patient Status:  Inpatient    1945 MRN [id number]   [facility] 2NE-A Attending James Das MD   [facility] Day # 6 PCP Abe Delaney MD     Subjective:  Amy Antonio is a(n) 72 year 77.9*   --   79.8*  80.5*  81.6  81.0   MCH  23.7*   --   24.0*  24.3*  24.3*  24.5*   MCHC  30.4*   --   30.1*  30.2*  29.8*  30.3*   RDW  18.0*   --   17.9*  18.5*  18.8*  19.1*   NEPRELIM  4.12   --   6.21  6.99*   --    --    WBC  5.7   --   8.2  9.3 obesity  6. Chronic atrial fibrillation  7. Chronic lower extremity lymphedema  8. Chronic right upper extremity lymphedema in association with a distant history of breast CA treated with lumpectomy and follow-up radiation therapy  9.  History of hypothyroi

## 2018-10-01 NOTE — PROGRESS NOTES
[facility] Note: Route Optimization for Acetaminophen (OFIRMEV)    Patient is currently on Acetaminophen (OFIRMEV) 1000 mg IV every 6 hours as needed.    The patient meets the criteria to convert to the oral equivalent as established by the IV to Oral con

## 2018-10-01 NOTE — PROGRESS NOTES
[facility]  Afebrile. Abdomen soft, midline incision intact with staples, no redness or drainage. Bowel sounds present, pt admits passing flatus. Joaquin's negative bilaterally. Plan to discharge to [facility] tomorrow. Pt receiving Lovenox daily.   Shoul

## 2018-10-01 NOTE — PROGRESS NOTES
[facility]  Progress Note     Moe Stockton Patient Status:  Inpatient    1945 MRN [id number]   [facility] 4SW-A Attending Dustin Mello MD   [facility] Day # 6 PCP Jennie Khan MD     Chief Complaint: medical management    S --    --    AST  8*  11*  15   --    --    ALT  <6*  <6*  <6*   --    --    BILT  0.6  1.5  2.0   --    --    TP  4.7*  5.0*  4.5*   --    --        Estimated Creatinine Clearance: 54.5 mL/min (based on SCr of 0.84 mg/dL).     No results for input(s): PTP

## 2018-10-01 NOTE — PLAN OF CARE
PLAN OF CARE - SHIFT NOTE      Patient is ANOx4, on 1-2L NC with plan to sara reid A Fib, cont B/B, up with SBA + walker.  L & R arm prec - L arm PICC, R side breast cancer in past. Patient has had a decreased appetite however ate 100% of her dinner this e oxygenation Progressing        SAFETY ADULT - FALL    • Free from fall injury Progressing

## 2018-10-01 NOTE — CM/SW NOTE
10/01/18 1500   Discharge disposition   Expected discharge disposition Skilled Nurs   Name of Bebeto Zabala       Patient has been accepted to The [address] [place] [address] [phone] when medically clear.     Tay Rosado RN,

## 2018-10-02 LAB
ALBUMIN: 2.4 G/DL
ALKALINE PHOSPHATATE(ALK PHOS): 159 IU/L
BILIRUBIN TOTAL: 0.9 MG/DL
BUN: 26 MG/DL
CALCIUM: 7.9 MG/DL
CHLORIDE: 106 MEQ/L
CREATININE, SERUM: 0.92 MG/DL
GLOBULIN: 3.8 G/DL
GLUCOSE: 120 MG/DL
HEMATOCRIT: 31.4 %
HEMOGLOBIN: 9.8 G/DL
PLATELETS: 275 K/UL
POTASSIUM, SERUM: 3.8 MEQ/L
PROTEIN, TOTAL: 6.2 G/DL
RED BLOOD COUNT: 4.11 X 10-6/U
SGOT (AST): 17 IU/L
SGPT (ALT): 10 IU/L
SODIUM: 140 MEQ/L
WHITE BLOOD COUNT: 8.1 X 10-3/U

## 2018-10-02 NOTE — PLAN OF CARE
PLAN OF CARE - SHIFT NOTE      Patient is ANOx4, on 1L NC (goal O2 > 89%), tele A Fib, cont B/B last BM 10/1 passing flatus, up with 1 + walker to commode/chair. CXR done earlier today showed increasing BL Pleural Effusions & atelectasis/consolidation. MUSCULOSKELETAL - ADULT    • Return mobility to safest level of function Progressing    • Maintain proper alignment of affected body part Progressing        PAIN - ADULT    • Verbalizes/displays adequate comfort level or patient's stated pain goal Pr

## 2018-10-02 NOTE — PROGRESS NOTES
[facility]  Progress Note    Zarina Patel Patient Status:  Inpatient    1945 MRN [id number]   [facility] 2NE-A Attending Deneen Bergeron MD   [facility] Day # 7 PCP Cynthia Knight MD     Assessment and Plan:     1.  Patient is st neoplasm of the cervix     Osteoarthritis     Type II diabetes mellitus, uncontrolled ([facility] [address]     Essential hypertension     Obesity, unspecified     Malignant neoplasm of thyroid gland ([facility] [address]     Encounter for long-term (current) use of other medications 273 lb 13 oz (124.2 kg)  09/11/18 : 283 lb 9.6 oz (128.6 kg)  09/06/18 : 280 lb (127 kg)  08/03/18 : 268 lb 11.9 oz (121.9 kg)  06/12/18 : 284 lb (128.8 kg)  05/01/18 : (!) 320 lb 15.8 oz (145.6 kg)      Physical Exam:  General: alert, oriented, no apparen Facility-Administered Medications:  acetaminophen (TYLENOL EXTRA STRENGTH) tab 1,000 mg 1,000 mg Oral Q6H PRN   simethicone (MYLICON) chewable tab 80 mg 80 mg Oral TID CC and HS   Insulin Aspart Pen (NOVOLOG) 100 UNIT/ML flexpen 1-5 Units 1-5 Units Subcuta from Last 1 Encounters:  No data found for PF        Haresh Sun  10/2/2018  1:38 PM

## 2018-10-02 NOTE — PROGRESS NOTES
[facility]  Progress Note    Alejandro Chisholm Patient Status:  Inpatient    1945 MRN [id number]   [facility] 2NE-A Attending Mena Olivas MD   [facility] Day # 7 PCP Ángel Centeno MD       SUBJECTIVE:  Comfortable  No pain  Tolerat Topical [email or web address]   phenol (CHLORASEPTIC) 1.4 % oral liquid spray 1-5 spray 1-5 spray Oral Q2H PRN   morphINE sulfate (PF) 4 MG/ML injection 1 mg 1 mg Intravenous Q2H PRN   Or      morphINE sulfate (PF) 4 MG/ML injection 2 mg 2 mg Intravenous Q2H PRN

## 2018-10-02 NOTE — PROGRESS NOTES
[facility]  Progress Note     Kolton Getting Patient Status:  Inpatient    1945 MRN [id number]   [facility] 4SW-A Attending Nancy Willams MD   [facility] Day # 7 PCP Kain Castañeda MD     Chief Complaint: medical management    S <6*   --    --   11*   BILT  1.5  2.0   --    --   2.9*   TP  5.0*  4.5*   --    --   5.0*       Estimated Creatinine Clearance: 52 mL/min (based on SCr of 0.88 mg/dL). No results for input(s): PTP, INR in the last 168 hours.     No results for input(s):

## 2018-10-02 NOTE — CM/SW NOTE
PRATIK s/w Abiyulissa Carreon with [facility], they do not have a bed tonight for patient but will have one tomorrow. RN informed. PRATIK/MATHIEU will follow up tomorrow.

## 2018-10-03 NOTE — PLAN OF CARE
1145: Spoke to BONY Massey with Dr Humera Ballard, and to patient to either restart eliquis at discharge or lovenox for 7 days and anticoagulant per cardiology.  Also, per Carole Crew okay to order not to continue hydrocodone at discharge as patient states she

## 2018-10-03 NOTE — CM/SW NOTE
Case discussed in rounds. Pt to d/c today and transfer to [facility]. PRATIK confirmed with Alia Salazar at [facility] that room will be ready at 2pm. Unit RN to call [phone] with nurse report.  PRATIK arranged for ambulance at 2pm.

## 2018-10-03 NOTE — PROGRESS NOTES
[facility]  Progress Note    Susana Nicholson Patient Status:  Inpatient    1945 MRN [id number]   [facility] 2NE-A Attending Lyndsey Landrum MD   [facility] Day # 8 PCP Arcenio Staples MD     Subjective:  Susana Nicholson is a(n) 72 year 30.1*  30.2*  29.8*  30.3*  29.9*   RDW  17.9*  18.5*  18.8*  19.1*  19.3*   NEPRELIM  6.21  6.99*   --    --   5.44   WBC  8.2  9.3  7.7  7.3  8.2   PLT  225.0  231.0  251.0  257.0  206.0     Recent Labs   Lab  09/29/18   0415   10/01/18   0422  10/02/18 with a distant history of breast CA treated with lumpectomy and follow-up radiation therapy  9. History of hypothyroidism, status post thyroidectomy, on repletion therapy  10. Essential hypertension  11. Hyperlipidemia  12.  Iron deficiency anemia     Plan:

## 2018-10-03 NOTE — CM/SW NOTE
10/03/18 1400   Discharge disposition   Expected discharge disposition Skilled Nurs   Name of [address] [facility]   Discharge transportation [facility]

## 2018-10-03 NOTE — PHYSICAL THERAPY NOTE
PHYSICAL THERAPY TREATMENT NOTE - INPATIENT    Room Number: 2762/5897-I     Session: 1   Number of Visits to Meet Established Goals: 5    Presenting Problem: 9/25 - Abdominal hysterectomy, total BSO, removal of pelvic mass    Problem List  Active Problems incisions)    WEIGHT BEARING RESTRICTION  Weight Bearing Restriction: None                PAIN ASSESSMENT   Rating: Unable to rate  Location: stomach discomfort  Management Techniques: Activity promotion; Body mechanics;Repositioning    BALANCE reach.  RN informed of session. Mobility Guidelines updated in Pt room for nursing staff.        THERAPEUTIC EXERCISES  Lower Extremity Ankle pumps  Quad sets     Upper Extremity Elbow flex/ext,  - open/close and RUE: horizontal abduction, LUE: shoul

## 2018-10-03 NOTE — PLAN OF CARE
CARDIOVASCULAR - ADULT    • Maintains optimal cardiac output and hemodynamic stability Progressing    • Absence of cardiac arrhythmias or at baseline Progressing        Diabetes/Glucose Control    • Glucose maintained within prescribed range Progressing Dustin Belcher, RN precepting [place], agree with the above. Report called to Bin Wayne, Nurse, at [facility]. [address] had to call and wait for a bariatric stretcher to brought for patient.   [facility] given copy of chart and dc paperwork, patient given

## 2018-10-03 NOTE — PROGRESS NOTES
[facility] HOSPITALIST  Progress Note     Valrie Felty Patient Status:  Inpatient    1945 MRN [id number]   [facility] 4SW-A Attending Isaias Dan MD   [facility] Day # 8 PCP Chikis Madird MD     Chief Complaint: medical management    S 2.9*   --    TP  5.0*  4.5*   --    --   5.0*   --     < > = values in this interval not displayed. Estimated Creatinine Clearance: 52 mL/min (based on SCr of 0.88 mg/dL). No results for input(s): PTP, INR in the last 168 hours.     No results for

## 2018-10-05 NOTE — H&P
[address]    History and Physical    Fonnie Melina Patient Status:  No patient class for patient encounter    1945 MRN [id number]   Location 650 [facility] , [address] [place] [address] Attending No att. right arm   • Shortness of breath    • Unspecified sinusitis (chronic)      Past Surgical History:  9/25/2018: ABDOMINAL HYSTERECTOMY TOTAL BSO STAGING; Bilateral      Comment:  Performed by Deneen Bergeron MD at [facility] 109: BIOPSY/REMOVAL, LYM equal, round, and reactive to light. No scleral icterus. Neck: Neck supple. No thyromegaly present. Cardiovascular: Normal rate, regular rhythm and normal heart sounds.     Pulmonary/Chest: Effort normal and breath sounds normal. No respiratory distress routine  O2:room air  Activity:OOB with assist; fall precautions  Diet:regular  Meds    Current Outpatient Medications:   •  Enoxaparin Sodium 40 MG/0.4ML Subcutaneous Solution, Inject 0.4 mL (40 mg total) into the skin daily for 7 days. , Disp: 2.8 mL, Rfl

## 2018-10-07 PROBLEM — J90 PLEURAL EFFUSION: Status: ACTIVE | Noted: 2018-01-01

## 2018-10-07 PROBLEM — R18.0 ASCITES, MALIGNANT: Status: ACTIVE | Noted: 2018-01-01

## 2018-10-07 NOTE — ED INITIAL ASSESSMENT (HPI)
From [place] at [facility]. Had an exploratory laparotomy by Dr Michelle Soriano on 10/25, with hysterectomy and resection of ileus. Pt c/o of increasing SOB since Wednesday. Usually on O2 at night but has needed it during the daytime.  Afebrile at [facility], pt diamond

## 2018-10-07 NOTE — ED PROVIDER NOTES
Patient Seen in: [facility] Emergency Department    History   Patient presents with:  Dyspnea DAKOTAH SOB (respiratory)  Rash Skin Problem (integumentary)    Stated Complaint:     HPI    22-year-old female comes the hospital complaint of having difficul RT.LUMPECTOMY 1996  No date: MIMI BIOPSY STEREOTACTIC NODULE 2 SITE BILAT      Comment:  07/2010 STEREO BX. BN.  No date: RADIATION RIGHT  1995: THYROIDECTOMY      Comment:  Total        Social History    Tobacco Use      Smoking status: Never Smoker      S other components within normal limits   PRO BETA NATRIURETIC PEPTIDE - Abnormal; Notable for the following components:    Pro-Beta Natriuretic Peptide 3,756 (*)     All other components within normal limits   URINALYSIS WITH CULTURE REFLEX - Abnormal; Nota sapheno-femoral junction, and posterior tibial veins. PATIENT STATED HISTORY: (As transcribed by Technologist)  Bilateral leg swelling. FINDINGS:  SAPHENOFEMORAL JUNCTION:  No reflux. THROMBI:  None visible.  COMPRESSION:  Normal compressibility, phasic within the lower pole posterior calyx, infundibulum and portion of the right renal pelvis. There is also some increased density within midpole infundibulum. The findings most consistent with staghorn calculus.   Small are staghorn calculus is identified w there is a small left pleural effusion measuring 3.7 cm in AP diameter. There is right basilar airspace disease likely related to atelectasis. Underlying right lung masses are obscured by the pleural and parenchymal process. OTHER:  Negative.        CONCL previous CT examination. Metastatic disease should be considered. There is an enlarging large right pleural effusion which has worsened compared to the 10/1/2018 examination. Airspace disease is also seen in the perihilar right upper and mid lung field. the cephalad aspect of the right atrium. Magnified heart and crowded pulmonary vessels. Low lung volumes. Bilateral pleural effusions and bibasilar atelectasis are increased. Left infrahilar mass again demonstrated.   Degenerative spurring of thoracic s patient achieving goals; and the potential problems that might occur during recuperation.   I discussed reasonable alternatives to the procedure, including risks, benefits, steps to prevent infection and side effects related to the alternatives, and risks r centimeters      CONCLUSION: Successful placement of a dual lumen PICC via the left cephalic vein    Dictated by: Bulmaro English MD on 9/26/2018 at 15:09     Approved by: Bulmaro English MD              Medications   Piperacillin Sod-Tazobactam So Loni Renteria

## 2018-10-08 NOTE — CONSULTS
[facility]  Report of Consultation    Kimberly Diaz Patient Status:  Inpatient    1945 MRN [id number]   [facility] 4NW-A Attending Moiz Salas MD   Hosp Day # 1 PCP Lennie Cantu MD     Reason for Consultation: Large right Comment:  07/2010 AVEL RESENDIZ BN.  No date: RADIATION RIGHT  1995: THYROIDECTOMY      Comment:  Total  Family History   Problem Relation Age of Onset   • Breast Cancer Self    • Arthritis Father    • Other (Other) Father    • Other (CAD) Father    • Other ( and face: Negative for hearing loss, tinnitus, nasal congestion, snoring, sore throat, hoarseness and voice change. Respiratory: Negative for cough, sputum, hemoptysis, chest pain, wheezing, dyspnea on exertion, or stridor.   She does have severe dyspnea w Decreased breath sounds over the right hemithorax. No active wheezing at present   Chest wall: No tenderness or deformity. Heart: Irregularly irregular/atrial fibrillation by history, rate acceptable, normal S1S2, no murmur.    Abdomen: soft, obese, non- symptoms status as well as diagnosis of possible infection/tumor from either side. Infectious disease will be consulted. Gynecology oncology surgery is aware of her admission and will be seeing her as well. 2. Severe obesity  3.  Chronic atrial fibrillat

## 2018-10-08 NOTE — ANESTHESIA POSTPROCEDURE EVALUATION
Jelly 23 Patient Status:  Inpatient   Age/Gender 67year old female MRN [id number]   [facility] 2NE-A Attending No att. providers found   [address] Day # 8 PCP Chikis Madrid MD       Anesthesia Post-op Note    Procedure(s):

## 2018-10-08 NOTE — PHYSICAL THERAPY NOTE
PT attempted to see the pt for an eval this pm, however pt currently off the floor receiving a thoracenthesis and paracenthesis; along with US doppler of BLE to r/o DVT. PT will re-attempt as time allows and pt medically appropriate.

## 2018-10-08 NOTE — H&P
[facility] HOSPITALIST  History and Physical     Jesse Gomez Patient Status:  Emergency    1945 MRN [id number]   Location [address] [facility] Attending Delores Nissen, MD   Hosp Day # 0 PCP Zak Hartman MD     Chief Complaint: [place] [facility] 1996  No date: MIMI BIOPSY STEREOTACTIC NODULE 2 SITE BILAT      Comment:  07/2010 STEREO BX. BN.  No date: RADIATION RIGHT  1995: THYROIDECTOMY      Comment:  Total    Social History:  reports that  has never smoked.  she has never used smokeless tobacco. S was completed. Pertinent positives and negatives noted in the HPI.     Physical Exam:    BP 96/57   Pulse 81   Temp 99 °F (37.2 °C) (Oral)   Resp 25   Ht 5' 5\" (1.651 m)   Wt 275 lb (124.7 kg)   LMP 12/14/2012   SpO2 98%   BMI 45.76 kg/m²   General: No TROP  <0.046       Imaging: Imaging data reviewed in Epic. ASSESSMENT / PLAN:     1. 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 Dr Princess Bedolla in ED  2. Cellulitis of abdomen   1. Likely from contact with fluid from fistula  2. IV Abx  3.

## 2018-10-08 NOTE — DIETARY NOTE
NUTRITION INITIAL ASSESSMENT    Pt is at moderate nutrition risk. Pt does not meet malnutrition criteria.     NUTRITION DIAGNOSIS/PROBLEM:    Increased nutrient needs related to increased demands of  Catabolic process as evidenced by recent exlap and  surgi Fluid Accumulation: ascites    NUTRITION PRESCRIPTION:  Calories: 9792-7919 calories/day (30-35 calories per kg/IBW)  Protein:  grams protein/day (1.5-2 grams protein per kg/IBW)  Fluid: ~1 ml/kcal or per MD discretion    MONITOR AND EVALUATE/NUTRITI

## 2018-10-08 NOTE — PROGRESS NOTES
[facility] Pharmacy Note: Antimicrobial Weight Dose Adjustment for: piperacillin/tazobactam ([place])    Amy Antonio is a 67year old female who has been prescribed piperacillin/tazobactam (ZOSYN) 3.375g once.   CrCl is estimated creatinine clearance is 53.2 mL/m

## 2018-10-08 NOTE — PROCEDURES
[facility]  Procedure Note    Chio Dempsey Patient Status:  Inpatient    1945 MRN [id number]   [facility] 4NW-A Attending Cynthia Sam MD   Hosp Day # 1 PCP Roseanne Torres MD     Procedure: US paracentesis    Pre-Procedure Ramses Memory

## 2018-10-08 NOTE — PROGRESS NOTES
[facility] HOSPITALIST  Progress Note     Melissa yN Patient Status:  Inpatient    1945 MRN [id number]   [facility] 4NW-A Attending Sharlene Dean MD   Hosp Day # 1 PCP Galen Rocha MD     Chief Complaint:     S: Patient   C/o troub 1.2   TP  5.0*  5.6*  5.0*       Estimated Creatinine Clearance: 49.2 mL/min (based on SCr of 0.93 mg/dL).     Recent Labs   Lab  10/08/18   0914   PTP  18.4*   INR  1.47*       Recent Labs   Lab  10/07/18   1801   TROP  <0.046            Imaging: Imaging d 10/8/2018  [id number]    Addendum  Patient seen and examined pete.  10am this morning  Ill appearing  Chest: Diminished B/L  CVS: S1, S2, RRR  ABD: Soft, tneder, ND, BS+, midline incision with erythema and discharge  EXT: No c/c    Imaging: Reviewed  Agree wit

## 2018-10-08 NOTE — CONSULTS
INFECTIOUS DISEASE CONSULT NOTE    Jesse Gomez Patient Status:  Inpatient    1945 MRN [id number]   [facility] 4NW-A Attending Curtis Orlando MD   Hosp Day # 1 PCP Zak Hartman Ti France MD  repeat 5 yrs  1996: LUMPECTOMY RIGHT      Comment:  RT.LUMPECTOMY 1996  No date: MIMI BIOPSY STEREOTACTIC NODULE 2 SITE BILAT      Comment:  07/2010 STEREO BX. BN.  No date: RADIATION RIGHT  1995: THYROIDECTOMY      Comment:  Total  Famil rectal suppository 10 mg, 10 mg, Rectal, Daily PRN  •  FLEET ENEMA (FLEET) 7-19 GM/118ML enema 133 mL, 1 enema, Rectal, Once PRN  •  Piperacillin Sod-Tazobactam So (ZOSYN) 4.5 g in dextrose 5 % 100 mL ADD-vantage, 4.5 g, Intravenous, Q8H    Review of Syste 1.2   TP  5.0*  5.6*  5.0*     Recent Labs   Lab  10/06/18   1900  10/07/18   1745   COLORUR  Mami*  Yellow   CLARITY  Hazy*  Hazy*   SPECGRAVITY  1.018  1.018   GLUUR  Negative  Negative   BILUR  Negative  Negative   KETUR  Negative  Negative   BLOODURIN collecting system within the lower pole posterior calyx, infundibulum and portion of the right renal pelvis. There is also some increased density within midpole infundibulum. The findings most consistent with staghorn calculus.   Small are staghorn calcul in AP diameter there is a small left pleural effusion measuring 3.7 cm in AP diameter. There is right basilar airspace disease likely related to atelectasis. Underlying right lung masses are obscured by the pleural and parenchymal process.  OTHER:  Jami Aus infrahilar mass.      Dictated by: Linda Coppola MD on 10/08/2018 at 9:00     Approved by: Linda Coppola MD            Xr Chest Ap Portable  (cpt=71045)    Result Date: 10/7/2018  PROCEDURE:  XR CHEST AP PORTABLE  (CPT=71045)  TECHNIQUE:  AP chest radiograp right lung may reflect atelectasis or pneumonia. 3.  Left lower lobe lung mass again identified. Multiple lung masses were seen on a previous CT. The findings are highly concerning for metastatic disease.      Dictated by: Leonides Jonas MD on 10/07/2

## 2018-10-08 NOTE — PROGRESS NOTES
NURSING ADMISSION NOTE      Patient admitted via cart. Oriented to room. Safety precautions initiated. Bed in low position. Call light in reach. Pt aox4. obese pt. Abdomen distended,bs present,passing gas. Had FERNANDA last Sept with abdominal incision/stap

## 2018-10-08 NOTE — IMAGING NOTE
Report given to [facility] RN R Thora 1.5 L and for Para 1.8 Litre removed. Dressing dry and intact on the r Mid quadrant para and the R thor site.  Vital documented and remain stable

## 2018-10-08 NOTE — CONSULTS
[facility] dosing service    Initial Pharmacokinetic Consult for Vancomycin Dosing     Trina Fairchild is a 67year old female who is being treated for cellulitis. Pharmacy has been asked to dose Vancomycin by Dr. Princess Valverde. Sr.  Patient has an ID consu

## 2018-10-08 NOTE — PLAN OF CARE
HEMATOLOGIC - ADULT    • Maintains hematologic stability Progressing        Patient/Family Goals    • Patient/Family Short Term Goal Progressing        SKIN/TISSUE INTEGRITY - ADULT    • Incision(s), wounds(s) or drain site(s) healing without S/S of infect

## 2018-10-08 NOTE — PROGRESS NOTES
[facility] Pharmacy Progress Note:  Anticoagulation Weight Dose Adjustment for enoxaparin ([place])    Jae Lebron is a 67year old female who has been prescribed enoxaparin (LOVENOX) for VTE prophylaxis.       Estimated Creatinine Clearance: 53.2 mL/min (base

## 2018-10-09 NOTE — PROGRESS NOTES
Back from paracentesis and thoracentesis,removed 1.5 liters from thoracentesis and 1.8 l from paracentesis,A/O X3,started on regular diet,Needs   Attended. stool is negative for c-diff.

## 2018-10-09 NOTE — PROCEDURES
Place midline catheter in LUE. Vein not fully compressible prior to insertion. Easily flushes. Initially + blood return. After additional flushes, no blood return noted. Place wire in line. Able to identify wire in vein with ultrasound.   Patient [facility]

## 2018-10-09 NOTE — PHYSICAL THERAPY NOTE
PT attempted to see pt for an eval, however pt currently awaiting a CT angio to r/o a PE per GUSTAVO Pyle. Will re-attempt following test if pt medically appropriate.

## 2018-10-09 NOTE — PLAN OF CARE
Dr. Kylie Hart made aware of the result of US Venous Doppler Bilateral legs. No new orders given. Will notify Oncology/ Dr. Leopoldo Bruns in the morning per Hospitalist. Patient alert and oriented X4. No complaints of pain at this time.  On 3LO2 via NC with oxygen s

## 2018-10-09 NOTE — PROGRESS NOTES
[facility] Oncology  Pt's abdomen soft, nondistended. Midline incision intact with staples draining purulent drainage from mid to lower aspect. Pt continues to experience SOB when speaking. CXR shoeing effusion and consolidation.   I spoke with pt regarding

## 2018-10-09 NOTE — PLAN OF CARE
Pt is alert and oriented x 4. Vitals stable. C/o pain in the Rt lung since Thoracentesis was done. Rt posterior middle back dressing intact, no subcutaneous emphysema noted. Notified  with no new orders.  Pt was on 4L Nasal cannula with O2 sats 100%,

## 2018-10-09 NOTE — PROGRESS NOTES
[facility]  Progress Note    Susana Nicholson Patient Status:  Inpatient    1945 MRN [id number]   [facility] 4NW-A Attending Patricia Owens MD   [facility] Day # 2 PCP Arcenio Staples MD     Subjective:  Susana Nicholson is a(n) 67 year ol NEPRELIM  16.60*  11.17*  7.17*   WBC  19.5*  14.3*  10.1   PLT  431.0  349.0  302.0     Recent Labs   Lab  10/07/18   1801  10/08/18   0547  10/08/18   2154  10/09/18   0511   GLU  138*  135*   --   106*   BUN  22*  24*   --   25*   CREATSERUM  0.86  0. white blood cells. No obvious organisms. The pH in the pleural space is not suggestive of a complicated pleural effusion. Tumor is still the most likely diagnosis.   In light of her pleuritic chest pain and my desire to better visualize the right pleural

## 2018-10-09 NOTE — PROGRESS NOTES
[address] [place] [address]  TEL: [phone]  FAX: [phone]    Jody Houser Patient Status:  Inpatient    1945 MRN [id number]   [facility] 4NW-A Attending Seb Mishra MD   [facility] [facility] Day # 2 PCP Susannah Elmore [facility] 107   --   106   CO2  26.0  28.0   --   27.0   ALKPHO  285*  220*   --   164*   AST  13*  12*   --   3*   ALT  12*  9*   --   6*   BILT  1.3  1.2   --   0.9   TP  5.6*  5.0*   --   4.6*       Results for Qasim Waggoner (MRN [id number]) as of 10/9/2018 15 in view of her history. pulm following. S/p thoracentesis, fluid does not seem c/w empyema. CT chest has been ordered     Case and plan d/w pt. D/w RN.  Will follow    Krista Graf

## 2018-10-09 NOTE — CM/SW NOTE
10/09/18 1500   CM/SW Screening   Referral Source    Information Source Nursing rounds   Patient's Mental Status Alert;Oriented   Patient's [address] [facility]   Patient lives with Alone   Shravan

## 2018-10-09 NOTE — PROCEDURES
[facility]  Procedure Note    Maurilio Fuentes Patient Status:  Inpatient    1945 MRN [id number]   [facility] Att[facility] Yeison Lester MD   Hosp Day # 2 PCP Noam Rboerson MD     Procedure: PICC    Pre-Procedu

## 2018-10-10 NOTE — PHYSICAL THERAPY NOTE
PT order received and chart reviewed. Pt is + for PE and plan is to start heparin.  Will f/u tomorrow

## 2018-10-10 NOTE — PROGRESS NOTES
[facility]  Progress Note    Angela Umana Patient Status:  Inpatient    1945 MRN [id number]   [facility] 4NW-A Attending Matt Garcia, [address] [facility] Day # 3 PCP Rissa Henry MD     Subjective:  Angela Umana is a(n) 67year old --    HGB  9.4*  8.6*  6.0*  8.5*   HCT  32.0*  28.8*  20.7*   --    MCV  82.1  80.9*  84.5   --    MCH  24.1*  24.2*  24.5*   --    MCHC  29.4*  29.9*  29.0*   --    RDW  20.0*  20.2*  19.9*   --    NEPRELIM  11.17*  7.17*  5.75   --    WBC  14.3*  10.1 longer acting anticoagulants as the patient will continue to need pleural fluid drainage on the right and quite possibly a repeat paracentesis at some point.   An IVC filter was discussed with the patient and will be held in reserve if she is intolerant of

## 2018-10-10 NOTE — PLAN OF CARE
HEMATOLOGIC - ADULT    • Maintains hematologic stability Progressing        RESPIRATORY - ADULT    • Achieves optimal ventilation and oxygenation Progressing        SKIN/TISSUE INTEGRITY - ADULT    • Incision(s), wounds(s) or drain site(s) healing without

## 2018-10-10 NOTE — PROGRESS NOTES
[address] [place] [address]  TEL: [phone]  FAX: [phone]    Cleo Lee Patient Status:  Inpatient    1945 MRN [id number]   [facility] 4NW-A Attending Tom Chan MD   [facility] Day # 3 PCP Ami Avila [place] 5.4*   ALB  1.8*   --   1.5*  2.5*   NA  144   --   142  143   K  3.3*  3.5*  3.9  3.0*   CL  107   --   106  110   CO2  28.0   --   27.0  24.0   ALKPHO  220*   --   164*  160*   AST  12*   --   3*  10*   ALT  9*   --   6*  11*   BILT  1.2   --   0.9  0.5 param abscess in previous CT and does not seem to have a leak clinically either  -follow temps and wbc  -follow surgical site     2. R sided pleural effusion- assume malignant in view of her history. pulm following.  S/p thoracentesis, fluid does not seem c

## 2018-10-10 NOTE — CM/SW NOTE
MSW sent updates to the [address] [place] [address] via [place].     Zeno Goodpasture, [facility]

## 2018-10-10 NOTE — OCCUPATIONAL THERAPY NOTE
Attempted to see pt this AM, pt with positive CTA for PE, pt will be held by OT until [facility] for 24 hours per dept protocol. OT will follow up tomorrow.

## 2018-10-10 NOTE — PROGRESS NOTES
[facility]PITAL[facility]  Progress Note     Frankey Sofia Patient Status:  Inpatient    1945 MRN [id number]   [facility] 4NW-A Attending [address] Day # 3 PCP Debra Zarate MD     Chief Complaint:     S: Patient     Chest [facility] 5.4*   ALB  1.8*   --   1.5*  2.5*   NA  144   --   142  143   K  3.3*  3.5*  3.9  3.0*   CL  107   --   106  110   CO2  28.0   --   27.0  24.0   ALKPHO  220*   --   164*  160*   AST  12*   --   3*  10*   ALT  9*   --   6*  11*   BILT  1.2   --   0.9  0.5 8. Hypothyroid  1. Continue levothyroxine   9. Chronic lymphedema of LE  10. Obesity   BMI 45   11. Hypokalemia- replace/ monitor   12. Ascites s/p paracentesis 10/8   1.8 liters removed  Await fluid path    GNR    13. hypomag- replace/ monitor   14.  Low 5' 5\" (1.651 m)   Wt 266 lb 12.8 oz (121 kg)   LMP 12/14/2012   SpO2 95%   BMI 44.40 kg/m²   Gen: NAD  Card: RRR  Pulm: Diminished    A/P  1. B/L PE  1. Heparin gtt  2. Echo pending  2. Right loculated pleural effusion  1. IR drainage in AM  3.  Peritoniti

## 2018-10-11 NOTE — OCCUPATIONAL THERAPY NOTE
OCCUPATIONAL THERAPY EVALUATION - INPATIENT     Room Number: 421/421-A  Evaluation Date: 10/11/2018  Type of Evaluation: Initial  Presenting Problem: PE    Physician Order: IP Consult to Occupational Therapy  Reason for Therapy: ADL/IADL Dysfunction and Sandrita Merlyn List  Principal Problem:    Pleural effusion  Active Problems:     Morbid obesity with BMI of 40.0-44.9, adult (HCC)    Cellulitis    Ascites, malignant    Acute saddle pulmonary embolism without acute cor pulmonale ([address]    Peritonitis ([address]      Past Medi for MD appointments. Pt reports she required assist with dressing and bathing, occasional assist with toileting. Pt reports she does not use a standard bed and only sits/sleeps in recliner lift chair. Pt has been at assisted living for 2.5 years, but still None    AM-PAC Score:  Score: 13  Approx Degree of Impairment: 63.03%  Standardized Score (AM-PAC Scale): 32.03  CMS Modifier (G-Code): CL    FUNCTIONAL TRANSFER ASSESSMENT  Supine to Sit : Minimum assistance  Sit to Stand: Supervision    Skilled Therapy P nor modifications of tasks    Clinical Decision Making LOW - Analysis of occupational profile, problem-focused assessments, limited treatment options    Overall Complexity LOW     OT Discharge Recommendations: Sub-acute rehabilitation(ELOS 12-14 days)

## 2018-10-11 NOTE — PLAN OF CARE
HEMATOLOGIC - ADULT    • Maintains hematologic stability Progressing        Patient/Family Goals    • Patient/Family Long Term Goal Progressing    • Patient/Family Short Term Goal Progressing        RESPIRATORY - ADULT    • Achieves optimal ventilation and

## 2018-10-11 NOTE — DIETARY NOTE
NUTRITION FOLLOW UP ASSESSMENT    Pt is at moderate nutrition risk. Pt does not meet malnutrition criteria.     NUTRITION DIAGNOSIS/PROBLEM:    Increased nutrient needs related to increased demands of  Catabolic process as evidenced by recent exlap and  nitza much.  She thinks she recently has been around 275#. ANTHROPOMETRICS:  Ht: 165.1 cm (5' 5\")  Wt: 123.7 kg (272 lb 11.2 oz). This is 216 % of IBW  BMI: Body mass index is 45.38 kg/m².   IBW: 57 kg  Usual Body Wt: unknown    WEIGHT HISTORY:   Wt Readings

## 2018-10-11 NOTE — PLAN OF CARE
Maintains hematologic stability Progressing      Achieve highest/safest level of mobility/gait Progressing      Achieves optimal ventilation and oxygenation Progressing      Incision(s), wounds(s) or drain site(s) healing without S/S of infection Progressi

## 2018-10-11 NOTE — PROGRESS NOTES
[facility]  Progress Note     Moe Stockton Patient Status:  Inpatient    1945 MRN [id number]   [facility] 4NW-A Attending [address] Day # 4 PCP Jennie Khan MD     Chief Complaint:     S: Patient     Says fee 3.8   CL  106  110  107   CO2  27.0  24.0  28.0   ALKPHO  164*  160*  174*   AST  3*  10*  9*   ALT  6*  11*  9*   BILT  0.9  0.5  0.6   TP  4.6*  5.1*  5.1*       Estimated Creatinine Clearance: 46.7 mL/min (based on SCr of 0.98 mg/dL).     Recent Labs PE      Quality:  · DVT Prophylaxis: Lovenox 60 mg daily   · CODE status: full  · Conklin: no    Plan of care:   Cont heparin gtt  cxr am  ? Need for pleural/ abd taps again  Lasix this evening   Bmp am  Pt wishes to remain full code at this time- would not

## 2018-10-11 NOTE — PROGRESS NOTES
[facility]  Progress Note    Mark Anthony Caal Patient Status:  Inpatient    1945 MRN [id number]   [facility] 4NW-A Attending Krishna Dean, [address] [facility] Day # 4 PCP Ramos Smith MD     Subjective:  Mark Anthony Caal is a(n) 67year old 75.4*       Cultures: Urine 10/6+ for E.  Coli  Ascitic fluid from paracentesis 10/8+ for gram-negative rods    Radiology:  Pleural fluid remains less than prethoracentesis though continues to be significant with right lower lobe atelectasis no significant pulmonary emboli-saphenous clot suspected pelvic origin  2. Large, partially loculated right-sided pleural effusion-from 9/26--  exudative sterile and benign  3. Recent FERNANDA-surgical wound/cellulitis on antibiotics  4.  Ascites with positive culture ID [email or web address]

## 2018-10-11 NOTE — PHYSICAL THERAPY NOTE
PHYSICAL THERAPY EVALUATION - INPATIENT      Room Number: 421/421-A  Evaluation Date: 10/11/2018  Type of Evaluation: Initial  Physician Order: PT Eval and Treat    Presenting Problem: pleural effusion, malignant ascites  Reason for Therapy: Mobility 26.8            Problem List  Principal Problem:    Pleural effusion  Active Problems:     Morbid obesity with BMI of 40.0-44.9, adult (HCC)    Cellulitis    Ascites, malignant    Acute saddle pulmonary embolism without acute cor pulmonale ([facility] [address]    Peritoni bathing, occasional assist with toileting. Pt reports she does not use a standard bed and only sits/sleeps in recliner lift chair. Pt has been at assisted living for 2.5 years, but still owns own home and is hopeful to one day return to her own home.     SUB hospital room?: A Little   -   Climbing 3-5 steps with a railing?: Total       AM-PAC Score:  Raw Score: 15   PT Approx Degree of Impairment Score: 57.7%   Standardized Score (AM-PAC Scale): 39.45   CMS Modifier (G-Code): CK    FUNCTIONAL ABILITY STATUS  G measures completed include AM-PAC and Elderly Mobility Scale. The AM-PAC '6-Clicks' Inpatient Basic Mobility Short Form was completed and this patient is demonstrating a 57.7% degree of impairment in mobility.  Research supports that patients with this leve

## 2018-10-11 NOTE — BH PROGRESS NOTE
[facility] Resource Referral Counselor Note    Susana Chalkyitsik Patient Status:  Inpatient    1945 MRN [id number]   [facility] 4NW-A Attending Niko Arias, [address] [facility] Day # 4 PCP Arcenio Staples MD       S(subjective) Patient s

## 2018-10-11 NOTE — PROGRESS NOTES
[address] [place] [address]  TEL: [phone]  FAX: [phone]    Lynda Isra Patient Status:  Inpatient    1945 MRN [id number]   [facility] 4NW-A Attending Gt Carrasquillo MD   [facility] Day # 4 PCP [place], [place] 106  110  107   CO2  27.0  24.0  28.0   ALKPHO  164*  160*  174*   AST  3*  10*  9*   ALT  6*  11*  9*   BILT  0.9  0.5  0.6   TP  4.6*  5.1*  5.1*       Results for Kuldeep Vergara (MRN [id number]) as of 10/9/2018 15:29   Ref.  Range 10/8/2018 14:51 10/8/2 seems to have less drainage, but may have a small infected seroma along medial incision. Will cont to monitor area     2. R sided pleural effusion- assume malignant in view of her history. pulm following. S/p thoracentesis, fluid does not seem c/w empyema.

## 2018-10-12 NOTE — PROGRESS NOTES
[facility]  Progress Note     Williams Bernstein Patient Status:  Inpatient    1945 MRN [id number]   [facility] 4NW-A Attending [address] Day # 5 PCP Swapnil Qureshi MD     Chief Complaint:     S: Patient     sarabjit a l 3.8  3.8  3.8   CL  106  110  107  106   CO2  27.0  24.0  28.0  30.0   ALKPHO  164*  160*  174*   --    AST  3*  10*  9*   --    ALT  6*  11*  9*   --    BILT  0.9  0.5  0.6   --    TP  4.6*  5.1*  5.1*   --        Estimated Creatinine Clearance: 44.4 mL/m levothyroxine   8. Chronic lymphedema of LE  9. Obesity   BMI 45   10. Hypokalemia- replace/ monitor   11. Ascites s/p paracentesis 10/8   1.8 liters removed  Await fluid path    GNR     May need another paracentesisi  abd seems more bloated today   12.  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  Gyn/onc  following     Ni Reza DO

## 2018-10-12 NOTE — PROGRESS NOTES
[facility]  Progress Note    Lynn Raymundo Patient Status:  Inpatient    1945 MRN [id number]   [facility] 4NW-A Attending Sarah Soto, [address] [facility] Day # 5 PCP Romel Garcia MD     Subjective:  Lynn Raymundo is a(n) 67year old 23*  21*   CREATSERUM  0.72  0.98  1.03*   GFRAA  97  67  63   GFRNAA  84  58*  54*   CA  5.4*  7.8*  8.0*   NA  143  143  143   K  3.0*  3.8  3.8  3.8   CL  110  107  106   CO2  24.0  28.0  30.0     No results for input(s): ABGPHT, BTJKVP5A, GVGTV0I, ABG

## 2018-10-12 NOTE — PHYSICAL THERAPY NOTE
PHYSICAL THERAPY TREATMENT NOTE - INPATIENT    Room Number: 421/421-A     Session: 1   Number of Visits to Meet Established Goals: 5    Presenting Problem: pleural effusion, malignant ascites    Problem List  Principal Problem:    Pleural effusion  Active Static Sitting: Good  Dynamic Sitting: Not tested           Static Standing: Fair -  Dynamic Standing: Fair -    ACTIVITY TOLERANCE  O2 Saturation: 93%  O2 Dev within reach;RN aware of session/findings; All patient questions and concerns addressed; In bed    ASSESSMENT     Pt seen for active flexibility and BLE strengthening, due to [facility] admission for pleural effusion, malignant ascites.     Results of th

## 2018-10-12 NOTE — PROGRESS NOTES
[address] [facility]  TEL: [phone]  FAX: [phone]    Shar Ray Patient Status:  Inpatient    1945 MRN [id number]   [facility] 4NW-A Attending Elvie Arora MD   [facility] Day # 5 PCP Megan Titus 77  97  67  63   GFRNAA  67  84  58*  54*   CA  7.3*  5.4*  7.8*  8.0*   ALB  1.5*  2.5*  1.8*   --    NA  142  143  143  143   K  3.9  3.0*  3.8  3.8  3.8   CL  106  110  107  106   CO2  27.0  24.0  28.0  30.0   ALKPHO  164*  160*  174*   --    AST  3*  1 PORTABLE (CPT=71045), 10/11/2018, 5:21. EDWARD , XR CHEST AP PORTABLE (CPT=71045), 10/10/2018, 4:58.     INDICATIONS: dyspnea    PATIENT STATED HISTORY: (As transcribed by Technologist)       FINDINGS: No significant change in overall moderate right pleural

## 2018-10-12 NOTE — PLAN OF CARE
HEMATOLOGIC - ADULT    • Maintains hematologic stability Progressing        RESPIRATORY - ADULT    • Achieves optimal ventilation and oxygenation Progressing          A&O times 4. VSS. Heparin drip infusing at 19ml/hr.   Next draw this AM.  No complaints of

## 2018-10-13 PROBLEM — R18.8 OTHER ASCITES: Status: ACTIVE | Noted: 2018-01-01

## 2018-10-13 NOTE — PROGRESS NOTES
[address] [facility]  TEL: [phone]  FAX: [phone]    Jody Houser Patient Status:  Inpatient    1945 MRN [id number]   [facility] 4NW-A Attending Seb Mishra MD   [facility] Day # 6 PCP Susannah [place] 126*  173*   BUN  20  23*  21*  20   CREATSERUM  0.72  0.98  1.03*  0.95   GFRAA  97  67  63  69   GFRNAA  84  58*  54*  60   CA  5.4*  7.8*  8.0*  7.8*   ALB  2.5*  1.8*   --   2.5*   NA  143  143  143  136   K  3.0*  3.8  3.8  3.8  3.8  3.8   CL  110  10 PLAN:     1. Drainage from abd incision- purulent and ascitic fluid with significant amount of PMNs  -cont CTX based on cx, and metronidazole for now while waiting for anaer cx.  No param abscess in previous CT and does not seem to have a bowel leak clinica

## 2018-10-13 NOTE — PROGRESS NOTES
[facility]  Progress Note     Harpal Swanson Patient Status:  Inpatient    1945 MRN [id number]   [facility] 4NW-A Attending [address] Day # 6 PCP Arianne Arenas MD     Chief Complaint:     S: Patient seen and exa BILT  0.5  0.6   --   0.8   TP  5.1*  5.1*   --   5.8*       Estimated Creatinine Clearance: 48.2 mL/min (based on SCr of 0.95 mg/dL).     Recent Labs   Lab  10/08/18   0914   PTP  18.4*   INR  1.47*       Recent Labs   Lab  10/07/18   1801   TROP  <0.046

## 2018-10-13 NOTE — PROGRESS NOTES
[facility]  Progress Note    Osmin Moore Patient Status:  Inpatient    1945 MRN [id number]   [facility] 4NW-A Attending Clarisa Lyon, [address] [facility] Day # 6 PCP Alessia Garrett MD     Subjective:  Osmin Moore is a(n) 67year old RDW  19.9*   --   19.9*  19.8*   --    NEPRELIM  5.75   --   12.61*  7.73*   --    WBC  6.5   --   15.6*  11.3   --    PLT  234.0   --   357.0  311.0  369.0     Recent Labs   Lab  10/11/18   0542  10/12/18   0616  10/13/18   0600   GLU  166*  126*  173* unclear if the effusion is related to transdiaphragmatic flow of ascitic fluid or if infact sterile fluid and the GPR are contaminant.  Additionally, given her known malignancy and the loculated effusion, the effusion may be malignancy related and could als

## 2018-10-14 NOTE — PROGRESS NOTES
[facility]ITAL[facility]  Progress Note     Tomasjohn Myriam Patient Status:  Inpatient    1945 MRN [id number]   [facility] 4NW-A Attending Lizzie Evangel[address] Day # 7 PCP Rivka Fleming MD     Chief Complaint:     S: Patient seen and exa 3.8  3.8  3.8  3.8  3.3*  3.6   CL  110  107  106  100*  98*   --    CO2  24.0  28.0  30.0  30.0  28.0   --    ALKPHO  160*  174*   --   207*   --    --    AST  10*  9*   --   14*   --    --    ALT  11*  9*   --   16   --    --    BILT  0.5  0.6   --   0.8 DO

## 2018-10-14 NOTE — PROGRESS NOTES
[facility]  Progress Note    Angelina Chau Patient Status:  Inpatient    1945 MRN [id number]   [facility] 4NW-A Attending [place], [address] [facility] Day # 7 PCP Dulce Maria Gillette MD     Subjective:  Angelina Chau is a(n) 67year old 26.3*   MCV  84.5   --   82.5  81.8   --   83.0   MCH  24.5*   --   24.3*  24.3*   --   24.6*   MCHC  29.0*   --   29.5*  29.7*   --   29.7*   RDW  19.9*   --   19.9*  19.8*   --   19.9*   NEPRELIM  5.75   --   12.61*  7.73*   --    --    WBC  6.5   --   1 therapy  10. History of hypothyroidism     Plan  Unfortunately, due to lack of supplies, I am unable to perform chest tube placement today. Reviewed with multiple departments, anticipate shipment tomorrow of awilda chest tube kit.  The patient is stable and

## 2018-10-14 NOTE — PROGRESS NOTES
[address] [facility]  TEL: [phone]  FAX: [phone]    [facility]:  [facility]    1945 MRN [id number]   [facility] 4NW-A Attending Burke Waggoner MD   [address] [facility] Day # 7 PCP Ulises Pantoja Lab  10/10/18   0601  10/11/18   0542  10/12/18   0616  10/13/18   0600  10/13/18   1418  10/14/18   0710   GLU  137*  166*  126*  173*  171*   --    BUN  20  23*  21*  20  19   --    CREATSERUM  0.72  0.98  1.03*  0.95  0.91   --    GFRAA  97  67  63  6 ascites ascites   AMYLASE, OTHER BODY FLUID Latest Units: U/L 13    TOTAL PROTEIN, BODY FLUID Latest Units: g/dL 3.2        Microbiology    Reviewed in EMR,   Cdiff neg  Pleural fluid cx GPR  Ascitic fluid cx E coli I to pip tazo, jo ann to CTX  Drainage abd

## 2018-10-14 NOTE — PLAN OF CARE
Pt is alert and oriented, voices no complaints of pain or shortness of breath. Heparin drip infusing at 2100U/h, PTT is therapeutic. Plan for chest tube placement tomorrow, pt is in agreement.  Abdominal staples mostly dry with small area with scant serous

## 2018-10-15 PROBLEM — C55 UTERINE LEIOMYOSARCOMA (HCC): Status: ACTIVE | Noted: 2018-01-01

## 2018-10-15 NOTE — PHYSICAL THERAPY NOTE
PHYSICAL THERAPY TREATMENT NOTE - INPATIENT    Room Number: 421/421-A     Session: 2   Number of Visits to Meet Established Goals: 5    Presenting Problem: pleural effusion, malignant ascites  Reason for Therapy: Mobility Dysfunction and Discharge Plannin Problem:    Pleural effusion  Active Problems:     Morbid obesity with BMI of 40.0-44.9, adult (HCC)    Cellulitis    Other ascites    Acute saddle pulmonary embolism without acute cor pulmonale (HCC)    Peritonitis (HCC)    Leiomyosarcoma (HCC)      Past M TOLERANCE  O2 Saturation: 95%  Liters of O2:  2  No shortness of breath    AM-PAC '6-Clicks' INPATIENT SHORT FORM - BASIC MOBILITY  How much difficulty does the patient currently have. ..  -   Turning over in bed (including adjusting bedclothes, sheets and conservation;Patient education;Gait training;Strengthening;Transfer training;Balance training  Rehab Potential : Good  Frequency (Obs): 5x/week    CURRENT GOALS   Goal #1 Patient is able to demonstrate supine - sit EOB @ level: supervision      Goal #2 Hodan De La Rosa

## 2018-10-15 NOTE — CONSULTS
[address]    PATIENT'S NAME: ANTONIOTEODORO, [address] PHYSICIAN: Walter Alexandra. [facility] PHYSICIAN: Leana Faustin M.D.    PATIENT ACCOUNT#:   [de-identified]    LOCATION:  [address] [place] [address] [facility]  MEDICAL RECORD #:   [id number]       DATE OF CYRUS ascites and pleural effusion as, if the chemotherapy shows a response, she may stop developing ascites and pleural effusion, which may make it easier to control the infection. We will continue followup along with you.       Dictated By Rick Craig,

## 2018-10-15 NOTE — PROGRESS NOTES
[address] [place] [address]  TEL: [phone]  FAX: [phone]    Ashley Gravely Patient Status:  Inpatient    1945 MRN [id number]   [facility] 4NW-A Attending Jenny Castillo MD   [facility] Day # 8 PCP Tom Troy 10/13/18   0600  10/13/18   1418  10/14/18   0710  10/15/18   0547   GLU  137*  166*  126*  173*  171*   --    --    BUN  20  23*  21*  20  19   --    --    CREATSERUM  0.72  0.98  1.03*  0.95  0.91   --    --    GFRAA  97  67  63  69  73   --    --    GFR COUNTED Unknown 100 100   SOURCE, OTHER BODY FLUID Unknown ascites ascites   AMYLASE, OTHER BODY FLUID Latest Units: U/L 13    TOTAL PROTEIN, BODY FLUID Latest Units: g/dL 3.2        Microbiology    Reviewed in EMR,   Cdiff neg  Pleural fluid cx GPR  Ascit

## 2018-10-15 NOTE — PROGRESS NOTES
[facility]ITAL[facility]  Progress Note     Ashley Gravely Patient Status:  Inpatient    1945 MRN [id number]   [facility] 4NW-A Attending Kendra Galeano [address] Day # 8 PCP Mayank Joshi MD     Chief Complaint:     S: Patient    Making a 63  69  73   --    --    GFRNAA  84  58*  54*  60  63   --    --    CA  5.4*  7.8*  8.0*  7.8*  7.4*   --    --    ALB  2.5*  1.8*   --   2.5*   --    --    --    NA  143  143  143  136  136   --    --    K  3.0*  3.8  3.8  3.8  3.8  3.8  3.3*  3.6  3.6 ID  3. repeat thoracentesis with  pleurex chest tube    6. NC anemia  1. Monitor   7. Hypothyroid  1. Continue levothyroxine   8. Chronic lymphedema of LE  9. Chronic Afib  1. Rate controlled with BB  10. Volume overload  1. Lasix  11. Morbid obesity  1.  B

## 2018-10-15 NOTE — PROGRESS NOTES
No change overnight, denies pain. Heparin drip infusing , will turn off at 5am. Dressing changed to abdomen. Had Bm , loose. left PICC intact.

## 2018-10-15 NOTE — CONSULTS
[address] Report of Consultation      Patient Name: Veronica Aguayo   YOB: 1945  Medical Record Number: [id number]  Consulting Physician: Dinesh Sanchez. Timbo Deshpande M.D.    Referring Physician: Chichi Chaney DO    Date of Consul radiation; thyroid cancer s/p total thyroidectomy; diverticulitis; essential hypertension; hypercholesterolemia; infectious mononucleosis; osteoarthritis; hypothyroidism.      Past Surgical History   TAHBSO and partial small bowel resection (as above); dayna Intravenous Once   Acidophilus/Pectin (PROBIOTIC) CAPS 1 capsule 1 capsule Oral Daily   [COMPLETED] iohexol (OMNIPAQUE) 350 MG/ML injection 100 mL 100 mL Intravenous ONCE PRN   0.9%  NaCl infusion  Intravenous Once   [COMPLETED] calcium chloride 2 g in sod 3350 (MIRALAX) powder packet 17 g 17 g Oral Daily PRN   magnesium hydroxide (MILK OF MAGNESIA) 400 MG/5ML suspension 30 mL 30 mL Oral Daily PRN   bisacodyl (DULCOLAX) rectal suppository 10 mg 10 mg Rectal Daily PRN   FLEET ENEMA (FLEET) 7-19 GM/118ML enema Integumentary Skin is warm and dry. Neurologic Alert and oriented x 3; motor and sensory grossly intact. Psychiatric Mood and affect appropriate.     Laboratory   Recent Results (from the past 24 hour(s))   PTT, ACTIVATED    Collection Time: 10/14/18  5 includes the Dose Index Registry.      PATIENT STATED HISTORY:(As transcribed by Technologist)  eval for large right pleural effusion/infected peritoneal fluid      CONTRAST USED:  100cc of Omnipaque 350     FINDINGS:       CHEST:    LUNGS:  Stable bilatera and medial to ascites which has developed since prior CT scans and may represent a small subacute hematoma. No significant change in mesenteric free fluid . Duodenum diverticulum.   ABDOMINAL WALL:  Stable soft tissue stranding and gas in the right lower amount of pericholecystic fluid.         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       B- Uterus and cerv peritonitis: On antibiotics as per ID. 4.   Right loculated pleural effusion: Chest tube in place. Pulmonary managing and to decide need for pleurodesis. 5.   Anemia: Check iron, vitamin B12, and folate levels.        Electronically signed by:    Rafat Connor

## 2018-10-15 NOTE — PLAN OF CARE
Achieves optimal ventilation and oxygenation Not Progressing      Incision(s), wounds(s) or drain site(s) healing without S/S of infection Not Progressing      Maintains hematologic stability Progressing      Achieve highest/safest level of mobility/gait P

## 2018-10-15 NOTE — PROGRESS NOTES
[facility]  Progress Note    Cleo Lee Patient Status:  Inpatient    1945 MRN [id number]   [facility] 4NW-A Attending Humza Pruitt, [address] [facility] Day # 8 PCP Ami Avila MD     Subjective:  Cleo Lee is a(n) 67year old 7. 9*  7.6*   --   7.8*   HCT  20.7*   --   26.8*  25.6*   --   26.3*   MCV  84.5   --   82.5  81.8   --   83.0   MCH  24.5*   --   24.3*  24.3*   --   24.6*   MCHC  29.0*   --   29.5*  29.7*   --   29.7*   RDW  19.9*   --   19.9*  19.8*   --   19.9*   NEPR extremity lymphedema  9. Chronic right upper extremity lymphedema in association with a distant history of breast CA treated with lumpectomy and follow-up radiation therapy  10. History of hypothyroidism     Plan  Chest tube to be inserted today.   Plan to

## 2018-10-16 NOTE — PROGRESS NOTES
[facility]  Progress Note    Ashley Chavira Patient Status:  Inpatient    1945 MRN [id number]   [facility] 4NW-A Attending Candy Kunz, [address] [facility] Day # 9 PCP Mayank Joshi MD     Subjective:  Ashley Chavira is a(n) 67year old 25.6*   --   26.3*  24.6*   MCV  82.5  81.8   --   83.0  84.2   MCH  24.3*  24.3*   --   24.6*  25.0*   MCHC  29.5*  29.7*   --   29.7*  29.7*   RDW  19.9*  19.8*   --   19.9*  21.3*   NEPRELIM  12.61*  7.73*   --    --   6.58   WBC  15.6*  11.3   --   17. Record chest tube output every shift.   Consider MIST2 protocol      Fabio Lemons SR.  10/16/2018  11:01 AM

## 2018-10-16 NOTE — PLAN OF CARE
Awake & alert,resting in bed at this time. Denies having pain,no SOB. Maintained on 2L per NC. Chest tube intact & draining. Voids frequently due to IV lasix. Kept dry & clean.wound dressing changed to lower mid abdomen. Maintained on merrem every 8H with no adv

## 2018-10-16 NOTE — PROGRESS NOTES
IV Chemotherapy Education    Patient:  Ivana Jacobson  Date:  10/16/18  Diagnosis: Metastatic uterine leiomyosarcoma.       Drug names: Adriamycin / Sebastian Quintanilla   D1 Adriamycin  D1D8 Sebastian Quintanilla     Treatment Effects on Bone Marrow:  Chemotherapy action on cancer

## 2018-10-16 NOTE — DIETARY NOTE
NUTRITION FOLLOW UP ASSESSMENT    Pt is at moderate nutrition risk. Pt does not meet malnutrition criteria.     NUTRITION DIAGNOSIS/PROBLEM:    Increased nutrient needs related to increased demands of  Catabolic process as evidenced by recent exlap and  nitza enterocutaneous fistula. Pt states she has been eating - but mentions how she was trying to diet & lose wt for knee replacement. Pt has been having loose stools. No n/v. No stomach pain/cramping. No chew/swal difficulties.   Pt states she has been losing

## 2018-10-16 NOTE — PROGRESS NOTES
[facility]  Progress Note     Alejandro Chisholm Patient Status:  Inpatient    1945 MRN [id number]   [facility] 4NW-A Attending [address] Day # 9 PCP Ángel Centeno MD     Chief Complaint:     S: Patient     Hurts to K  3.8   < >  3.8  3.8   < >  3.7  3.6  4.0  4.0  3.7   CL  107   < >  100*   < >  105   --   103  102   CO2  28.0   < >  30.0   < >  31.0   --   30.0  30.0   ALKPHO  174*   --   207*   --    --    --   107   --    AST  9*   --   14*   --    --    --   6 1. Lasix has been IV BID   2. Will change back to po lasix   3. Replace lytes as needed   11. Morbid obesity  1. BMI 44    PLAN    CXR -  CONCLUSION:    Right pleural effusion is smaller.   Continued moderate effusion, right mid and lower lung consolidati

## 2018-10-16 NOTE — CM/SW NOTE
CM spoke with [facility]. Patient able to receive chemotherapy while at rehab. [facility] [address] would like to speak with [facility] APN re: details of chemotherapy @ # [phone]. Updates sent to [facility].     Richie Treviño, 10/16/18, 4:30 PM

## 2018-10-16 NOTE — PLAN OF CARE
Impaired Functional Mobility    • Achieve highest/safest level of mobility/gait Progressing        RESPIRATORY - ADULT    • Achieves optimal ventilation and oxygenation Progressing        SKIN/TISSUE INTEGRITY - ADULT    • Incision(s), wounds(s) or drain s

## 2018-10-16 NOTE — PROGRESS NOTES
[facility]    Progress Note    Maurilio Fuentes Patient Status:  Inpatient    1945 MRN [id number]   [facility] 4NW-A Attending Tiera Freeman, [address] Day # 9 PCP Noam Roberson MD     Subjective:  Maurilio Fuentes is a(n) 72 year oxide (MAG-OX) tab 400 mg 400 mg Oral Once   [COMPLETED] iohexol (OMNIPAQUE) 350 MG/ML injection 100 mL 100 mL Intravenous ONCE PRN   [COMPLETED] Potassium Chloride ER (K-DUR M20) CR tab 40 mEq 40 mEq Oral Once   [COMPLETED] magnesium oxide (MAG-OX) tab 40 (ALBUMINAR) 5 % solution 500 mL 500 mL Intravenous Once   [COMPLETED] Lidocaine HCl (XYLOCAINE) 1 % injection      [COMPLETED] Vancomycin HCl (VANCOCIN) 2,000 mg in sodium chloride 0.9% 500 mL IVPB 25 mg/kg (Adjusted) Intravenous Once   [COMPLETED] Otis benign     Hypothyroidism     Pure hypercholesterolemia     Malignant neoplasm of breast (female), unspecified site     Type 2 diabetes mellitus without complication (HCC)     Cellulitis of lower extremity     Anasarca     Atrial fibrillation ([facility] [address]     Mor hemoglobin less than 7, or symptomatic anemia     Discharge planning: will need to determine if patient can receive chemotherapy as an outpatient at the [facility], discussed with  [address] [facility] FNP-RACHEL Ashton [facility]

## 2018-10-16 NOTE — PROGRESS NOTES
[address] [facility]  TEL: [phone]  FAX: [phone]    Chio Garreteileen Patient Status:  Inpatient    1945 MRN [id number]   [facility] 4NW-A Attending Cynthia Sam MD   [facility] Day # 9 PCP Abigail Hsieh Labs   Lab  10/11/18   0542   10/13/18   0600   10/15/18   0547  10/15/18   2050  10/16/18   0556  10/16/18   1230   GLU  166*   < >  173*   < >  129*   --   114*  160*   BUN  23*   < >  20   < >  24*   --   20  19   CREATSERUM  0.98   < >  0.95   < >  0.8 to CTX  Drainage abd incision cx amp C producing E coli    Radiology: reviewed    ASSESSMENT/ PLAN:     1.  Drainage from abd incision in pt with stage IV uterine leiomyosarcoma with suspected malignant ascitis  -ascitic fluid with significant amount of PMN

## 2018-10-16 NOTE — CONSULTS
[facility] Pharmacy Note: Antimicrobial Weight Dose Adjustment for: meropenem (Nicolás Gates)    Frankey Sofia is a 67year old female who has been prescribed meropenem (MERREM) 500 mg every 8 hours.   CrCl is estimated creatinine clearance is 60.2 mL/min (based on SCr

## 2018-10-17 NOTE — PROGRESS NOTES
Per Dr. Adela Patten, orders for alteplase and dornase alpha to be administered to pt's chest tube. Notified Dr. Adela Patten of medication on unit.

## 2018-10-17 NOTE — PROGRESS NOTES
[facility]  Progress Note    Cleo Lee Patient Status:  Inpatient    1945 MRN [id number]   [facility] 4NW-A Attending Tonya Miranda MD   [facility] Day # 10 PCP Ami Avila MD     Subjective:  Cleo Lee is a(n) 67 year ol 10/16/18   1252  10/17/18   0517   RBC  3.25*  3.13*   < >  2.92*  3.14*  2.94*   HGB  7.9*  7.6*   < >  7.3*  7.9*  7.4*   HCT  26.8*  25.6*   < >  24.6*  26.3*  24.9*   MCV  82.5  81.8   < >  84.2  83.8  84.7   MCH  24.3*  24.3*   < >  25.0*  25.2*  25.2 protocol           Davi Lemons SR.  10/17/2018  10:48 AM

## 2018-10-17 NOTE — PROGRESS NOTES
Assumed care of patient. Pt resting in bed. Roseline Garrison NP here to see. Occupational therapy here to see.

## 2018-10-17 NOTE — OCCUPATIONAL THERAPY NOTE
Attampted to see pt this PM for OT session, pt refusing stating she is too tired from previous PT session earlier today, OT will follow up as schedule permits.

## 2018-10-17 NOTE — PROGRESS NOTES
THE [facility] Progress Note      Patient Name: Merrie Babinski   YOB: 1945  Medical Record Number: [id number]  Attending Physician: Juvencio Kang.  Toni Aly M.D.       SUBJECTIVE:  Yocasta Win is a(n) 67year old female with n 450.0 10(3)uL    MCV 83.8 81.0 - 100.0 fL    MCH 25.2 (L) 27.0 - 33.2 pg    MCHC 30.0 (L) 31.0 - 37.0 g/dL    RDW 21.7 (H) 11.5 - 16.0 %    RDW-SD 63.1 (H) 35.1 - 46.3 fL   CBC, PLATELET; NO DIFFERENTIAL    Collection Time: 10/17/18  5:17 AM   Result Value 40 mEq Oral Once   [COMPLETED] Potassium Chloride ER (K-DUR M20) CR tab 40 mEq 40 mEq Oral Q4H   [COMPLETED] magnesium oxide (MAG-OX) tab 800 mg 800 mg Oral Once   [COMPLETED] potassium chloride IVPB premix 40 mEq 40 mEq Intravenous Once   [COMPLETED] magn (PORCINE) drip 28320lkzwl/250mL infusion CONTINUOUS 200-3,000 Units/hr Intravenous Continuous   [COMPLETED] predniSONE (DELTASONE) tab 50 mg 50 mg Oral Q6H   [COMPLETED] DiphenhydrAMINE HCl (BENADRYL) cap 50 mg 50 mg Oral Once   [COMPLETED] Albumin Human ( discharge. Recommend apixban bid. Electronically signed by:    Michael Zhou M.D.   THE [facility]  Director, Bone and Soft Tissue Sarcoma Program  Section of Hematology/Oncology, [address] [facility]

## 2018-10-17 NOTE — PROGRESS NOTES
[address] [facility]  TEL: [phone]  FAX: [phone]    Efrencoco Manuel Patient Status:  Inpatient    1945 MRN [id number]   [facility] 4NW-A Attending Veronica Workman MD   [facility] Day # 10 PCP Clearance Xiong, 0600   10/16/18   0556  10/16/18   1230  10/17/18   0517   GLU  166*   < >  173*   < >  114*  160*  142*   BUN  23*   < >  20   < >  20  19  20   CREATSERUM  0.98   < >  0.95   < >  0.76  0.83  0.84   GFRAA  67   < >  69   < >  91  81  80   GFRNAA  58*   < (CPT=71045)    TECHNIQUE: AP chest radiograph was obtained. COMPARISON: EDSHAWN , CT CHEST+ABDOMEN+PELVIS(ALL CNTRST ONLY)(CPT=71260/09591), 10/13/2018, 2:44. EDWARD , XR CHEST AP PORTABLE (CPT=71045), 10/16/2018, 5:29.  EDWARD , XR CHEST AP PORTABLE (CPT S/p CT placement, new cx so far neg. -CXR seems worse today although pt clinically stable, await pulm eval    Case and plan d/w pt.  Will follow    Francesca Roberts

## 2018-10-17 NOTE — PROGRESS NOTES
[facility]  Progress Note     Jae Lebron Patient Status:  Inpatient    1945 MRN [id number]   [facility] 4NW-A Attending Benji Galvez MD    Hosp Day # 10 PCP Michael Ortez MD     Chief Complaint:     S: Patient     C/o sor 1.8*   --   2.5*   --   1.9*   --    --    NA  143   < >  136   < >  139  140  139   K  3.8   < >  3.8  3.8   < >  4.0  3.7  4.0  4.0   CL  107   < >  100*   < >  103  102  102   CO2  28.0   < >  30.0   < >  30.0  30.0  30.0   ALKPHO  174*   --   207*   -- levothyroxine   8. Chronic lymphedema of LE  9. Chronic Afib  1. Rate controlled with BB  2. Heparin gtt   10. Volume overload  Wt decreased   1. Lasix has been IV BID    2. back to po lasix   3. Replace lytes as needed   4. Cont daily wts   11.  Deconditio

## 2018-10-17 NOTE — PHYSICAL THERAPY NOTE
PHYSICAL THERAPY TREATMENT NOTE - INPATIENT    Room Number: 421/421-A     Session: 3   Number of Visits to Meet Established Goals: 5    Presenting Problem: pleural effusion, malignant ascites    History related to current admission:Pt is 67year old femal of 40.0-44.9, adult ([facility] [address]    Cellulitis    Other ascites    Acute saddle pulmonary embolism without acute cor pulmonale (HCC)    Peritonitis (HCC)    Leiomyosarcoma (HCC)    Secondary sarcoma of right lung (HCC)    Secondary [facility] of liver ([facility]    Harleen -  Dynamic Standing: Fair -    ACTIVITY TOLERANCE                         O2 WALK                    AM-PAC '6-Clicks' INPATIENT SHORT FORM - BASIC MOBILITY  How much difficulty does the patient currently have. ..  -   Turning over in bed (including adjusti temporary pain associated with activity. She demonstrates reduced trunk control for supine>sit, requiring minimal assistance to achieve seated EOB.       DISCHARGE RECOMMENDATIONS  PT Discharge Recommendations: Sub-acute rehabilitation(with ELOS 12-14 days)

## 2018-10-17 NOTE — PROGRESS NOTES
INTERVENTIONAL PULMONOLOGY BRIEF NOTE  Requested to evaluate chest tube to ensure patency. Chest tube was flushed with 20mL sterile saline via stopcock with ease and no resistance.  Chest tube tubing (to collection cannister) was also flushed with 10mL ster

## 2018-10-18 NOTE — PROGRESS NOTES
THE [facility] Progress Note      Patient Name: Merrie Babinski   YOB: 1945  Medical Record Number: [id number]      SUBJECTIVE:  Yocasta Win is a(n) 67year old female with newly diagnosed metastatic leiomyosarcoma of th Water for Injection (MIST2) syringe 30 mL 5 mg Intrapleural Q12H   [COMPLETED] magnesium oxide (MAG-OX) tab 800 mg 800 mg Oral Once   meropenem (MERREM) 1 g in sodium chloride 0.9% 100 mL MBP 1 g Intravenous Q8H   [COMPLETED] dextrose 5 % 100 mL with sodiu capsule Oral Daily   [COMPLETED] iohexol (OMNIPAQUE) 350 MG/ML injection 100 mL 100 mL Intravenous ONCE PRN   0.9%  NaCl infusion  Intravenous Once   [COMPLETED] calcium chloride 2 g in sodium chloride 0.9% 100 mL IVPB 2 g Intravenous Once   [COMPLETED] Po hydroxide (MILK OF MAGNESIA) 400 MG/5ML suspension 30 mL 30 mL Oral Daily PRN   bisacodyl (DULCOLAX) rectal suppository 10 mg 10 mg Rectal Daily PRN   FLEET ENEMA (FLEET) 7-19 GM/118ML enema 133 mL 1 enema Rectal Once PRN   [COMPLETED] furosemide (LASIX) i

## 2018-10-18 NOTE — PLAN OF CARE
HEMATOLOGIC - ADULT    • Maintains hematologic stability Progressing    • Free from bleeding injury Progressing        Pt. Noted to be pale this am. Had 1L output from chest tube overnight w/ MIST. CBC drawn in am and results of 7.1.  Pt. W/ another 2L outp

## 2018-10-18 NOTE — CM/SW NOTE
Interdisciplinary Rounds: 10/18/18  Admitted: 10/7/2018 LOS: 11  Disciplines in attendance: charge nurse, staff nurse, CM, [address] and discharge plan reviewed. Discharge to [facility].      Active issues needing resolution prior to discharge: [facility]

## 2018-10-18 NOTE — PROGRESS NOTES
[address] [place] [address]  TEL: [phone]  FAX: [phone]    Moe Stockton Patient Status:  Inpatient    1945 MRN [id number]   [facility] 4NW-A Attending Billy Snowden MD   [facility] Day # 6 PCP Jennie Khan, 10/13/18   0600   10/16/18   0556  10/16/18   1230  10/17/18   0517   GLU  173*   < >  114*  160*  142*   BUN  20   < >  20  19  20   CREATSERUM  0.95   < >  0.76  0.83  0.84   GFRAA  69   < >  91  81  80   GFRNAA  60   < >  79  71  70   CA  7.8*   < >  7. from drainage from incision with amp c producing E coli.  assume pocket along incision was communicating with ascitic fluid, therefore ascitic fluid may have had  an amp c producing E coli, cont meropenem  -new paracentesis attempted but not enough fluid an

## 2018-10-18 NOTE — PROGRESS NOTES
[facility]  Progress Note    Maurilio Fuentes Patient Status:  Inpatient    1945 MRN [id number]   [facility] 4NW-A Attending Avelino Abraham MD   [address] Day # 11 PCP Noam Roberson MD     Subjective:  Maurilio Fuentes is a(n) 67 year ol 7. 3*  7.9*  7.4*   HCT  25.6*   < >  24.6*  26.3*  24.9*   MCV  81.8   < >  84.2  83.8  84.7   MCH  24.3*   < >  25.0*  25.2*  25.2*   MCHC  29.7*   < >  29.7*  30.0*  29.7*   RDW  19.8*   < >  21.3*  21.7*  22.3*   NEPRELIM  7.73*   --   6.58   --    --

## 2018-10-18 NOTE — PROGRESS NOTES
[facility]  Progress Note     Ashley Gravely Patient Status:  Inpatient    1945 MRN [id number]   [facility] 4NW-A Attending Jm Siddiqui MD    [facility] Day # 11 PCP Mayank Joshi MD     Chief Complaint:     S: Patient    C/o r sh 139  140  139   K  3.8  3.8   < >  4.0  3.7  4.0  4.0   CL  100*   < >  103  102  102   CO2  30.0   < >  30.0  30.0  30.0   ALKPHO  207*   --   107   --    --    AST  14*   --   6*   --    --    ALT  16   --   9*   --    --    BILT  0.8   --   0.5   -- areas     Has had 2 liters of thick bloody drainage w/ the TPA. Has increased pain assoc w/ this . Cont Norco , add morphine w/ injection as needed   5. Check Hgb at 1500   6. NC anemia  1.  Monitor   2. check  Hgb   May need blood from Ct drainage amts   7

## 2018-10-18 NOTE — PLAN OF CARE
HEMATOLOGIC - ADULT    • Maintains hematologic stability Progressing    • Free from bleeding injury Progressing        RESPIRATORY - ADULT    • Achieves optimal ventilation and oxygenation Progressing        SKIN/TISSUE INTEGRITY - ADULT    • Incision(s),

## 2018-10-18 NOTE — PHYSICAL THERAPY NOTE
PHYSICAL THERAPY TREATMENT NOTE - INPATIENT    Room Number: 421/421-A     Session: 4   Number of Visits to Meet Established Goals: 5    Presenting Problem: pleural effusion, malignant ascites    History related to current admission:Pt is 67year old female eye; left eye cataract surgery Aug. 2018   • Disorder of thyroid    • Diverticulitis of colon (without mention of hemorrhage)(562.11)    • Edema     chronic LE   • Exposure to medical diagnostic radiation     last tx 1995   • High blood pressure    • High Sitting down on and standing up from a chair with arms (e.g., wheelchair, bedside commode, etc.): A Little   -   Moving from lying on back to sitting on the side of the bed?: A Lot   How much help from another person does the patient currently need. ..   - RECOMMENDATIONS  PT Discharge Recommendations: Sub-acute rehabilitation(with ELOS 12-14 days)     PLAN  PT Treatment Plan: Bed mobility; Endurance; Energy conservation;Patient education;Gait training;Strengthening;Transfer training;Balance training  Rehab Po

## 2018-10-19 NOTE — CONSULTS
[facility] dosing service    Initial Pharmacokinetic Consult for Vancomycin Dosing     Shar Ray is a 67year old female admitted on 10/7/2018 who is being treated for sepsis.   Pharmacy has been asked to dose Vancomycin by Dr. Elena Dodd    She is aller The ascites could be further characterized with paracentesis as clinically appropriate. Extensive regions of consolidation within the lungs may represent pneumonia and/or atelectasis. Moderate to large loculated right pleural effusion.   Mild to moderate

## 2018-10-19 NOTE — OCCUPATIONAL THERAPY NOTE
Pt w/ transfer to ICU this a.m., secondary to hypotension. Will require new orders for OT when medically stable.  PT, discussed w/ RN Madhuri Baker who st pt remains hypotensive today, but may be appropriate tomorrow to initiate re-eval.     Thank you for allowing

## 2018-10-19 NOTE — OCCUPATIONAL THERAPY NOTE
OCCUPATIONAL THERAPY TREATMENT NOTE - INPATIENT     Room Number: 421/421-A  Session: 1   Number of Visits to Meet Established Goals: 5    Presenting Problem: PE    History related to current admission: Pt is 67year old female admitted on 10/7/2018 from [place] Cellulitis    Other ascites    Acute saddle pulmonary embolism without acute cor pulmonale (HCC)    Peritonitis (HCC)    Leiomyosarcoma (HCC)    Secondary sarcoma of right lung (HCC)    Secondary sarcoma of liver (HCC)    Secondary malignancy of omentum (H LIVING ASSESSMENT  AM-PAC ‘6-Clicks’ Inpatient Daily Activity Short Form  How much help from another person does the patient currently need…  -   Putting on and taking off regular lower body clothing?: A Lot  -   Bathing (including washing, rinsing, drying greatly benefit from continued skilled OT services at the STEPHIE level of care upon D/C from the hospital. Pt has been assesed and is able to tolerate the demands of STEPHIE.  This would allow for increased safety and independence in the current deficit areas of:

## 2018-10-19 NOTE — PROGRESS NOTES
[facility]  Progress Note    Osmin Moore Patient Status:  Inpatient    1945 MRN [id number]   [facility] 4SW-A Attending Jaron Curtis MD   [facility] Day # 15 PCP Alessia Garrett MD     STATUS UPDATE: The patient had a large volume d Conjunctivae/corneas clear. No scleral icterus. No conjunctival     hemorrhage. Nose: Nares normal.   Throat: Lips, mucosa, and tongue normal.  No thrush noted. Neck: Soft, supple neck; trachea midline, no adenopathy, no thyromegaly.    Lungs: Decreas with yesterday evening's chest x-ray. This morning's film is comparable to yesterday morning. Medications reviewed. IMPRESSION:        1. New hypotension and dropping hemoglobin-etiology unclear.   Bleeding is obviously a consideration and a site w

## 2018-10-19 NOTE — PROGRESS NOTES
[facility]  Progress Note     Amy Antonio Patient Status:  Inpatient    1945 MRN [id number]   [facility] 4NW-A Attending Marce Santillan MD    [facility] Day # 12 PCP Abe Delaney MD     Chief Complaint:       10/19 transfer to [address] [place] [address] 10/16/18   0556  10/16/18   1230  10/17/18   0517  10/19/18   0554   GLU  173*   < >  114*  160*  142*  110*   BUN  20   < >  20  19  20  23*   CREATSERUM  0.95   < >  0.76  0.83  0.84  1.11*   GFRAA  69   < >  91  81  80  57*   GFRNAA  60   < >  79  71  7 4. Ascites/Peritonitis s/p paracentesis 10/8  1. Continue abx per ID  2. s/p Paracentesis  X1,     Did not  Need 10/15    3. Ct abd today   5. Recent FERNANDA 9/2108  1. leiomyosarcoma  Stage 4   2.  Heme following   Has liver spot, no chemo until infection u need another.   Check hemoccult w/ next stool    BP 94/45 (BP Location: Left arm)   Pulse 116   Temp 98 °F (36.7 °C) (Temporal)   Resp 24   Ht 165.1 cm (5' 5\")   Wt 264 lb 4.8 oz (119.9 kg)   LMP 12/14/2012   SpO2 (!) 88%   BMI 43.98 kg/m²    Gen: NAD, pal

## 2018-10-19 NOTE — PROGRESS NOTES
[facility]  TEL: [phone]  FAX: [phone]    Phoebe Coello Patient Status:  Inpatient    1945 MRN [id number]   [facility] 4NW-A Attending Jose Martin Kam MD   [facility] Day # 12 PCP Regina Quintero, 209.0  235.0   NEUT   --    --    --    --    --   72   --    LYMPH   --    --    --    --    --   9   --    MON   --    --    --    --    --   5   --    EOS   --    --    --    --    --   2   --     < > = values in this interval not displayed.      Recent Units: mg/dL 58     PH 7.37    Microbiology    Reviewed in EMR,   Cdiff neg  Pleural fluid cx clostridium, repeat pleural fluid cx neg to date  Ascitic fluid cx E coli I to pip tazo, jo ann to CTX  Drainage abd incision cx amp C producing E coli    Radiology:

## 2018-10-19 NOTE — PLAN OF CARE
HEMATOLOGIC - ADULT    • Maintains hematologic stability Not Progressing        Pt. W/ persistent hypotension following Liter bolus and 2 units PRBCs overnight. Chest tube output less than 100 overnight. No evidence of bleeding otherwise. PtDakota Sidhu

## 2018-10-19 NOTE — PLAN OF CARE
HEMATOLOGIC - ADULT    • Maintains hematologic stability Not Progressing          RESPIRATORY - ADULT    • Achieves optimal ventilation and oxygenation Progressing        SKIN/TISSUE INTEGRITY - ADULT    • Incision(s), wounds(s) or drain site(s) healing wi

## 2018-10-19 NOTE — PROGRESS NOTES
[facility] Pharmacy Note:  Renal Adjustment for meropenem (Teola Shadow)    Lynda Dhaliwal is a 67year old female who has been prescribed meropenem (MERREM) 1 g every 8 hrs. CrCl is estimated creatinine clearance is 41.2 mL/min (A) (based on SCr of 1.11 mg/dL (H)).

## 2018-10-19 NOTE — PROGRESS NOTES
[facility] Progress Note      Patient Name: Veronica Aguayo   YOB: 1945  Medical Record Number: [id number]  Attending Physician: Dinesh Sanchez.  Timbo Deshpande M.D.       SUBJECTIVE:  Phoebe Coello is a(n) 67year old female with n Status Issued     Expiration Date 183443904847     Blood Type Barcode 9500    MAGNESIUM    Collection Time: 10/19/18  5:54 AM   Result Value Ref Range    Magnesium 1.7 (L) 1.8 - 2.5 mg/dL   COMP METABOLIC PANEL (14)    Collection Time: 10/19/18  5:54 AM 0. 71 0.10 - 1.00 x10(3) uL    Eosinophil Absolute Manual 0.28 0.00 - 0.30 x10(3) uL    Basophil Absolute Manual 0.00 0.00 - 0.10 x10(3) uL    Metamyelocyte Absolute Manual 0.57 (H) <0.01 x10(3) uL    Neutrophils % Manual 72 %    Band % 8 %    Lymphocyte % 10/19/18 12:47 PM   Result Value Ref Range    Blood Product [id number]     Unit Number [id number]     UNIT ABO O     UNIT RH NEG     Product Status Issued     Expiration Date [id number]     Blood Type Barcode 9500        Medications    [COMPLETED] sod 800 mg 800 mg Oral Once   [COMPLETED] potassium chloride IVPB premix 40 mEq 40 mEq Intravenous Once   [COMPLETED] magnesium oxide (MAG-OX) tab 400 mg 400 mg Oral Once   [COMPLETED] iohexol (OMNIPAQUE) 350 MG/ML injection 100 mL 100 mL Intravenous ONCE PRN Albumin Human (ALBUMINAR) 5 % solution 500 mL 500 mL Intravenous Once   [COMPLETED] Lidocaine HCl (XYLOCAINE) 1 % injection      [COMPLETED] Vancomycin HCl (VANCOCIN) 2,000 mg in sodium chloride 0.9% 500 mL IVPB 25 mg/kg (Adjusted) Intravenous Once   [COMP

## 2018-10-19 NOTE — PHYSICAL THERAPY NOTE
Physical Therapy    Pt w/ transfer to ICU this a.m., secondary to hypotension. Will require new orders for PT when medically stable.   Discussed w/ rn Eyal Sullivan who st pt remains hypotensive today, but may be appropriate tomorrow to initiate re-eval.

## 2018-10-20 NOTE — PROCEDURES
[facility]  Procedure Note    Cleo Lee Patient Status:  Inpatient    1945 MRN [id number]   [facility] Att[facility] Tonya Miranda MD   [facility] Day [facility] PCP Ami Avila MD     Procedure: IVC Filter placemen

## 2018-10-20 NOTE — PROGRESS NOTES
[facility]  Progress Note    Maurilio Fuentes Patient Status:  Inpatient    1945 MRN [id number]   [facility] 4SW-A Attending Avelino Abraham MD   [facility] Day # 15 PCP Noam Roberson MD     Subjective:  Maurilio Fuentes is a(n) 67 year ol 21.2*  26.4*   MCV  84.2   < >  86.5   < >  87.0   --   85.8  84.1   MCH  25.0*   < >  26.3*   < >  27.2   --   27.5  27.4   MCHC  29.7*   < >  30.5*   < >  31.2   --   32.1  32.6   RDW  21.3*   < >  19.8*   < >  19.2*   --   18.8*  19.9*   NEPRELIM  6.5 lymphedema in association with a distant history of breast CA treated with lumpectomy and follow-up radiation therapy  11. History of hypothyroidism     Plan  · Trend hemoglobin.   Pt has received 2 units pRBCs on 10/18, 1 unit 10/19 and 1 unit 10/20  · Rig

## 2018-10-20 NOTE — H&P
[address] [place] [address] Main Patient Status:  Inpatient    1945 MRN [id number]   Location [address] [facility] Attending Chikis Tamayo MD   [facility] Day # 12 PCP Jennie Khan MD     Admitting Diagnosis:   PE Maternal Grandmother        Allergies/Medications:    Allergies:    Radiology Contrast *    ITCHING    Comment:Itching in throat and skin  Adhesive Tape           OTHER (SEE COMMENTS)    Comment:rash    Medications:  No current outpatient medications on armen representative the potential benefits, risks, and side effects of this procedure, the likelihood of the patient achieving goals; and the potential problems that might occur during recuperation.   I discussed reasonable alternatives to the procedure, includi

## 2018-10-20 NOTE — PROGRESS NOTES
Pharmacy Note: Renal dose adjustment   Valrie Felty was originally prescribed Merrem which was renally dose adjusted at the time of the original order per P&T approved renal dosing protocol. Renal function has now improved.     Estimated Creatinine Leta

## 2018-10-20 NOTE — PROGRESS NOTES
STAN HOSPITALIST  Progress Note     Lynn Raymundo Patient Status:  Inpatient    1945 MRN [id number]   [facility] 4NW-A Attending Janusz Lugo MD    [address] Day # 15 PCP Romel Garcia MD     Chief Complaint:      S: Patient feels Monica Maya < >  23*  22*  23*   CREATSERUM  0.76   < >  1.11*  1.01  0.70   GFRAA  91   < >  57*  64  100   GFRNAA  79   < >  50*  56*  87   CA  7.1*   < >  6.4*  6.7*  6.7*   ALB  1.9*   --   1.6*   --   2.5*   NA  139   < >  138  138  139   K  4.0   < >  4.3  4.3 infection under control    6. Right sided pleural effusion s/p thoracentesis 10/8  1. Pulm following  2. Culture from 10/08 with clostridium - abx per ID  3. repeat thoracentesis with  chest tube placed 10/15   On suction  4.  TPA injestion  X2 lead to 3 li

## 2018-10-20 NOTE — PROGRESS NOTES
[address] [facility]  TEL: [phone]  FAX: [phone]    Lynn Raymundo Patient Status:  Inpatient    1945 MRN [id number]   [facility] 4NW-A Attending Yoon Patel MD   [address] [facility] Day # 15 PCP Romel Garcia, PLT  232.0   < >  209.0   < >  113.0*  158.0   --   144.0*   NEUT   --    --   72   --    --    --    --    --    LYMPH   --    --   9   --    --    --    --    --    MON   --    --   5   --    --    --    --    --    EOS   --    --   2   --    --    -- date  Ascitic fluid cx E coli I to pip tazo, jo ann to CTX  Drainage abd incision cx amp C producing E coli    Radiology: reviewed      ASSESSMENT/ PLAN:     1. hypotension  - suspect related to vol depletion but agree with bcx and empiric vanco for now to r/

## 2018-10-20 NOTE — PLAN OF CARE
HEMATOLOGIC - ADULT    • Maintains hematologic stability Not Progressing        Impaired Activities of Daily Living    • Achieve highest/safest level of independence in self care Not Progressing        Impaired Functional Mobility    • Achieve highest/safe

## 2018-10-21 NOTE — PHYSICAL THERAPY NOTE
PHYSICAL THERAPY RE-EVALUATION - INPATIENT     Room Number: 451/451-A  Evaluation Date: 10/21/2018  Type of Evaluation: Re-evaluation  Physician Order: PT Eval and Treat    Presenting Problem: Jadiel PE dx on 10/10; LLE thrombus; s/p IVC filter placed on noted procedure  8/2-8/4/18: KAMRYN  2/24-5/1/18: pneumonia, respiratory distress. 10/15/2018 - chest tube placed, draining  10/20/18 IVC filter placed. Therapy significant labs:  10/21/2018 Hgb = 8.8, Hct = 28.8.         Problem List  Principal Problem: facility(currently at [place] [address] following recent admission)   Home Layout: One level                Lives With: Staff 24 hours  Drives: No  Patient Owned Equipment: Rolling walker(rollator, electric w/c)  Patient Regularly Uses: Glasses    Prior Level of Indepen the following:    Right Hip flexion  3-/5  Left Hip flexion  3-/5  Right Knee extension  3/5  Left Knee extension  3/5    BALANCE  Static Sitting: Fair +  Dynamic Sitting: Fair -  Static Standing: Not tested  Dynamic Standing: Not tested    ADDITIONAL TEST energy conservation    Transfers  Sit to stand = deferred for today due to pt weakness and safety concern. Stand to sit = deferred for today due to pt weakness and safety concern.       Skilled Therapy Provided: Above functional ability scores are FIM def back with certain positions. Functional outcome measures completed include: The -PAC '6-Clicks' Inpatient Basic Mobility Short Form was completed and this patient is demonstrating a 81.38% degree of impairment in mobility.  Research supports that

## 2018-10-21 NOTE — PROGRESS NOTES
STAN HOSPITALIST  Progress Note     Marianoarlene Hendricks Patient Status:  Inpatient    1945 MRN [id number]   [facility] 4NW-A Attending Patricio Trores MD    [facility] Day # 14 PCP Jorge Alberto Mobley MD     Chief Complaint:      S: SOB unchanged.  Dec 10/21/18   0514   GLU  110*  101*  118*  108*   BUN  23*  22*  23*  21*   CREATSERUM  1.11*  1.01  0.70  0.64   GFRAA  57*  64  100  103   GFRNAA  50*  56*  87  89   CA  6.4*  6.7*  6.7*  7.0*   ALB  1.6*   --   2.5*  2.1*   NA  138  138  139  141   K  4.3 with clostridium - abx per ID  3. repeat thoracentesis with  chest tube placed 10/15   On suction  4. TPA injestion  X2 lead to 3 liter vol loss, hypotension, 2 units PRBC's. TPA dc'ed  5. Ct scan chest , possible PNA?  Started on abx per ID including IV va

## 2018-10-21 NOTE — PROGRESS NOTES
10/21/18 1232   Clinical Encounter Type   Visited With Patient   Routine Visit Introduction   Continue Visiting Yes   Patient's Supportive Strategies/Resources  provided emotional support.    Patient Spiritual Encounters   Spiritual Needs Chaplai

## 2018-10-21 NOTE — PLAN OF CARE
Pt. Received one of packed cells this am.  Tolerated well. Repeat hgb was 8.4. Sent stool of occult blood and it came back positive. Had 4-5 formed to soft stools today. Pt. Asked for Immodium and she received one dose.     Is dyspnic on exertion and

## 2018-10-21 NOTE — PROGRESS NOTES
[facility]  Progress Note    Osmin Moore Patient Status:  Inpatient    1945 MRN [id number]   [facility] 4SW-A Attending Jaron Curtis MD   [facility] Day # 15 PCP Alessia Garrett MD     Subjective:  Osmin Moore is a(n) 67 year ol 28.8*   MCV  84.2   < >  86.5   < >  85.8   < >  85.0  86.2  87.3   MCH  25.0*   < >  26.3*   < >  27.5   < >  27.2  27.4  26.7*   MCHC  29.7*   < >  30.5*   < >  32.1   < >  32.0  31.7  30.6*   RDW  21.3*   < >  19.8*   < >  18.8*   < >  20.1*  20.7*  20. pulmonary metastases   7. Severe obesity  8. Chronic atrial fibrillation  9. Chronic lower extremity lymphedema  10.  Chronic right upper extremity lymphedema in association with a distant history of breast CA treated with lumpectomy and follow-up radiation

## 2018-10-22 PROBLEM — Z51.5 PALLIATIVE CARE ENCOUNTER: Status: ACTIVE | Noted: 2018-01-01

## 2018-10-22 NOTE — PHYSICAL THERAPY NOTE
Attempted to see pt. Pt is starting a dose of tPA with close monitoring of drainage due to recent drop in Hgb. Pt therefore requesting that we hold inpt PT until her response to the treatment is stable.   I fully agree with this request and will continue

## 2018-10-22 NOTE — DIETARY NOTE
NUTRITION FOLLOW UP ASSESSMENT    Pt is at moderate nutrition risk. Pt does not meet malnutrition criteria.     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 Pt states she has been eating - but mentions how she was trying to diet & lose wt for knee replacement. Pt has been having loose stools. No n/v.  No s

## 2018-10-22 NOTE — PROGRESS NOTES
10/22/18 0957   Clinical Encounter Type   Visited With Health care provider   Routine Visit Follow-up   Continue Visiting Yes   Patient Spiritual Encounters   Spiritual Interventions Spiritual care hand-off, follow up request from patient RN.

## 2018-10-22 NOTE — PROGRESS NOTES
[address] [facility]  TEL: [phone]  FAX: [phone]    Amy Antonio Patient Status:  Inpatient    1945 MRN [id number]   [facility] 4NW-A Attending Leon Pagan MD   [facility] Day # 13 PCP Abe Delaney, 8.59*   --    --    --    --    WBC  9.9   < >  14.2*   < >  11.4   < >  8.2  9.1  8.3   PLT  232.0   < >  209.0   < >  144.0*   < >  131.0*  197.0  199.0   NEUT   --    --   72   --    --    --    --    --    --    LYMPH   --    --   9   --    --    -- 7.37    Microbiology    Reviewed in EMR,   Cdiff neg  Pleural fluid cx clostridium, repeat pleural fluid cx neg to date  Ascitic fluid cx E coli I to pip tazo, jo ann to CTX  Drainage abd incision cx amp C producing E coli    Radiology: reviewed      ASSESSME

## 2018-10-22 NOTE — PROGRESS NOTES
[facility] HOSPITALIST  Progress Note     Chio Dempsey Patient Status:  Inpatient    1945 MRN [id number]   [facility] 4NW-A Attending Evin Lubin MD    [facility] Day # 15 PCP Roseanne Torres MD     Chief Complaint:      S:  eating small amts BUN  23*   < >  23*  21*  17   CREATSERUM  1.11*   < >  0.70  0.64  0.51*   GFRAA  57*   < >  100  103  111   GFRNAA  50*   < >  87  89  96   CA  6.4*   < >  6.7*  7.0*  7.1*   ALB  1.6*   --   2.5*  2.1*   --    NA  138   < >  139  141  140   K  4.3   < 3. ID feels should attempt drainage again   5. Recent FERNANDA 9/2108  1. leiomyosarcoma  Stage 4   2. Onc following      3. Cancer progressing on recent ct scan    6. Right sided pleural effusion s/p thoracentesis 10/8  1. Pulm following  2.  Culture from 10

## 2018-10-22 NOTE — CONSULTS
[facility]  [id number]  Hospital Day #15  Date of Consult: 10/22/18       Reason for Consultation: Consult requested for evaluation of palliative care needs and goals of care discussion. • THYROIDECTOMY  1995    Total       Social History:   Living Situation Prior to Hospitalization: living in assisted living    Functional Status: requires help with ADLs at this time    Yazdanism/Cultural Information:  Yazdanism Affiliation:unknown    Al [facility] Palliative Care team will plan on revisiting with patient either prior to discharge to discuss goals of care or if patient is unable to positively respond to continued medical interventions.     A total of 30 minutes were spent on this consult; >50% was

## 2018-10-22 NOTE — PROGRESS NOTES
[facility]  Progress Note    Russ Jackson Patient Status:  Inpatient    1945 MRN [id number]   [facility] 4SW-A Attending Tomás Vigil MD   [facility] Day # 15 PCP Clearance MD Tyrese     Subjective:  Russ Jackson is a(n) 67 year ol 24.3*   < >  21.2*   < >  29.2*  31.3*  30.8*   MCV  84.2   < >  86.5   < >  85.8   < >  88.5  88.9  90.3   MCH  25.0*   < >  26.3*   < >  27.5   < >  27.0  27.0  26.7*   MCHC  29.7*   < >  30.5*   < >  32.1   < >  30.5*  30.4*  29.5*   RDW  21.3*   < >  1 hypothyroidism     Plan  · Hemoglobin stable. Pt has received 2 units pRBCs on 10/18, 1 unit 10/19 and 1 unit 10/20  · Right chest tube draining serosanguinous fluid but still with significant effusion. Patient is agreed to retry TPA/dornase.   We will

## 2018-10-22 NOTE — CM/SW NOTE
SW was told that pt had been waiting to talk to a sw/cm by Dorene Gunn NP. SW met with pt. She was in pain and did not want to talk today. SW contact info left with pt. SW/CM to follow up with her tomorrow.     Mary Curtis, 10/22/18, 3:07 PM

## 2018-10-22 NOTE — PROGRESS NOTES
Pharmacy Dosing Service: Vancomycin    Amy Antonio is a 67year old female for whom pharmacy has been dosing vancomycin for possible sepsis. The patient is on day 4 of vancomycin treatment. The goal trough is 15-20 mcg/mL.     Labs:  Lab Results   Com

## 2018-10-22 NOTE — OCCUPATIONAL THERAPY NOTE
Attempted to see patient for OT evaluation, however patient just started on TPA and requests not to be seen. Will follow-up as time permits.

## 2018-10-23 NOTE — PROGRESS NOTES
[facility]  Progress Note    Jae Lebron Patient Status:  Inpatient    1945 MRN [id number]   [facility] 4SW-A Attending Bethany Booker MD   [facility] Day # 12 PCP Michael Ortez MD     Subjective:  Jae Lebron is a(n) 67 year ol 10/23/18   0535   RBC  2.81*   < >  2.47*   < >  3.41*  3.37*  3.29*   HGB  7.4*   < >  6.8*   < >  9.1*  9.1*  8.8*   HCT  24.3*   < >  21.2*   < >  30.8*  30.3*  29.7*   MCV  86.5   < >  85.8   < >  90.3  89.9  90.3   MCH  26.3*   < >  27.5   < >  26.7* therapy  11.  History of hypothyroidism     Plan  · Hemoglobin stable.  Pt received 2 units pRBCs on 10/18, 1 unit 10/19 and 1 unit 10/20  · Right chest tube draining serosanguinous fluid but still with significant effusion.     Will restart TPA/dornase inf

## 2018-10-23 NOTE — OCCUPATIONAL THERAPY NOTE
OCCUPATIONAL THERAPY EVALUATION - INPATIENT     Room Number: 451/451-A  Evaluation Date: 10/23/2018  Type of Evaluation: Initial  Presenting Problem: hypotension    Physician Order: IP Consult to Occupational Therapy  Reason for Therapy: ADL/IADL Dysfuncti labs:  10/21/2018 Hgb = 8.8, Hct = 28.8. Problem List  Principal Problem:    Pleural effusion  Active Problems:     Morbid obesity with BMI of 40.0-44.9, adult (HCC)    Cellulitis    Other ascites    Acute saddle pulmonary embolism without acute cor p height toilet; Toilet riser  Shower/Tub and Equipment: Walk-in shower  Other Equipment: None    Occupation/Status: retired  Hand Dominance: Right  Drives: No  Patient Regularly Uses: Glasses    Prior Level of Function: Prior to hospital admission 9/25/18-10 TOLERANCE  Pulse: 82  Heart Rate Source: Monitor  Resp: 19  BP: 103/71  BP Location: Left arm  BP Method: Automatic  Patient Position: Lying    O2 SATURATIONS  SPO2 on Room Air at Rest: 98        Ambulation oxygen flow (liters per minute): 0    ACTIVITIES demonstrating a  63.03% degree impairment in activities of daily living. Research supports that patients with this level of impairment often benefit from STEPHIE upon d/c.     In this OT evaluation patient presents with the following performance deficits: decre commode    Functional Transfer Goals  Patient will perform all functional transfers:  with min assist

## 2018-10-23 NOTE — PLAN OF CARE
Received the pt from ICU at 1400 hours. Pt is alert and oriented x4. Vitals stable. C/o pain in the Rt chest tube site but declined pain meds. Chest tube is draining dark red output. About 360 ml drained from 2pm to 5.10 pm. Blood pressure is stable.  Remai

## 2018-10-23 NOTE — PROGRESS NOTES
[facility]    Progress Note    Denisha Jurado Patient Status:  Inpatient    1945 MRN [id number]   [facility] 4NW-A Attending Aj Valdez MD   [facility] Day # 16 PCP Bigg Ko MD     Subjective:  Denisha Jurado is a(n) 67 yea Intrapleural Once   meropenem (MERREM) 1 g in sodium chloride 0.9% 100 mL MBP 1 g Intravenous Q8H   [COMPLETED] 0.9%  NaCl infusion  Intravenous Once   [COMPLETED] Magnesium Sulfate IVPB premix SOLN 2 g 2 g Intravenous Once   Loperamide HCl (IMODIUM) cap 2 injection 100 mL 100 mL Intravenous ONCE PRN   [COMPLETED] Potassium Chloride ER (K-DUR M20) CR tab 40 mEq 40 mEq Oral Once   [COMPLETED] magnesium oxide (MAG-OX) tab 400 mg 400 mg Oral Once   [COMPLETED] Lidocaine HCl (XYLOCAINE) 2 % injection 20 mL 20 mL mEq 40 mEq Oral Q4H   [COMPLETED] morphINE sulfate (PF) 4 MG/ML injection 4 mg 4 mg Intravenous Once   [COMPLETED] Piperacillin Sod-Tazobactam So (ZOSYN) 4.5 g in dextrose 5 % 100 mL ADD-vantage 4.5 g Intravenous Once   atorvastatin (LIPITOR) tab 10 mg 10 ([facility] [address]     Gout     Idiopathic gout of right knee     Primary osteoarthritis of right knee     Cellulitis     Acute renal failure (ARF) (HCC)     Lymphedema     DM type 2 with diabetic peripheral neuropathy (HCC)     Metabolic alkalosis     Respiratory acid

## 2018-10-23 NOTE — CM/SW NOTE
Met and spoke with patient at ICU bedside. Pt confirms [facility] choice for [facility] [address]. Pt expressing concerns that [facility] won't be able to do her chemo regimen (as yet to be confirmed as pt not currently stable) \"if I do get strong enough to get it\".   C

## 2018-10-23 NOTE — PLAN OF CARE
TPA and Dornase instilled into chest tube. Clamped per order. With 45 minutes left of clamping, she c/o of pain 7/10. Norco given with little relief. CT unclamped after 2 hours. Approx. 800 cc's of dark red blood from chest tube.   To be repeated this

## 2018-10-23 NOTE — PHYSICAL THERAPY NOTE
Attempted to see pt. Pt currently refusing. She is wiped out from working with OT, then transferring rooms, and just finished lunch. Today the pt received the second of 6 tPA infusions she is scheduled to have over the next 48 hours.   Pt continues with

## 2018-10-23 NOTE — PROGRESS NOTES
[address] [facility]  TEL: [phone]  FAX: [phone]    Ples Quirino Patient Status:  Inpatient    1945 MRN [id number]   [facility] 4NW-A Attending Judith Elliott MD   [facility] Day # 12 HOLLAND Montiel, --    --    --    --    --    --    MON  5   --    --    --    --    --    --    EOS  2   --    --    --    --    --    --     < > = values in this interval not displayed.      Recent Labs   Lab  10/20/18   0525  10/21/18   0514  10/22/18   0518  10/23/18 in pt with stage IV uterine leiomyosarcoma  -ascitic fluid with significant amount of PMNs and e coli in cx. new cx from drainage from incision with amp c producing E coli.  assume pocket along incision was communicating with ascitic fluid, therefore asciti

## 2018-10-23 NOTE — PLAN OF CARE
HEMATOLOGIC - ADULT    • Maintains hematologic stability Progressing    • Free from bleeding injury Progressing          RESPIRATORY - ADULT    • Achieves optimal ventilation and oxygenation Progressing          SKIN/TISSUE INTEGRITY - ADULT    • Incision(

## 2018-10-23 NOTE — PROGRESS NOTES
[facility]PITAL[facility]  Progress Note     Mahendra Garnica Patient Status:  Inpatient    1945 MRN [id number]   [facility] 4NW-A Attending Macario Cummins MD    [facility] Day # 16 PCP Jackie Camara MD     Chief Complaint:      S:   C/o pain w/ deep 5561  10/21/18   0514  10/22/18   0518  10/23/18   0535   GLU  118*  108*  113*  102*   BUN  23*  21*  17  15   CREATSERUM  0.70  0.64  0.51*  0.57   GFRAA  100  103  111  107   GFRNAA  87  89  96  93   CA  6.7*  7.0*  7.1*  6.9*   ALB  2.5*  2.1*   --   1 following      3. Cancer progressing on recent ct scan    6. Right sided pleural effusion s/p thoracentesis 10/8  1. Pulm following  2. Culture from 10/08 with clostridium -   3. repeat thoracentesis with  chest tube placed 10/15     4.  TPA injestion  X2 l

## 2018-10-24 NOTE — PROGRESS NOTES
Received pt from med/onc at 1230. Pt aox4, maintaining O2 sats on 2L NC. Tele-afib, controlled. Briefed, incontinent at times, BM per bedpan. PRN norco for pain at chest tube site. Chest tube to -20 suction.  Pt noted to be hypotensive this evening, Hemoglo

## 2018-10-24 NOTE — PHYSICAL THERAPY NOTE
PHYSICAL THERAPY TREATMENT NOTE - INPATIENT    Room Number: 338/652-U     Session: 1   Number of Visits to Meet Established Goals: 5    Presenting Problem: Jadiel PE dx on 10/10; LLE thrombus; s/p IVC filter placed on 10/20/18; bleeding from unknown etiology draining  10/20/18 IVC filter placed.     Therapy significant labs:  10/24/2018 Hgb = 7.8, Hct = 27.1        Problem List  Principal Problem:    Pleural effusion  Active Problems:     Morbid obesity with BMI of 40.0-44.9, adult (HCC)    Cellulitis    Other sore.\"    Patient’s self-stated goal is not verbalized.     OBJECTIVE  Precautions: Limb alert - right;Low vision(LUE PICC; right Chest tube to suction)    WEIGHT BEARING RESTRICTION  Weight Bearing Restriction: None                PAIN ASSESSMENT   Rating attempt standing due to fear of falling, but was convinced to stand to RW with two present and bed behind her to sit if needed. Pt performed sit>stand with min A to RW but required increased time to achieve upright posture.  She stood ~3min, then noted feel will roll left and right with mod indep. Ongoing 10/24/18     Goal #4 Compliance with activity recommendations.        Goal #5 Pt able to tolerate standing at RW at supervision x1 for 5min     Goal #6 Pt is able to demonstrate sit>stand at supervision x1

## 2018-10-24 NOTE — PROGRESS NOTES
[facility]PITAL[facility]  Progress Note     Susana [place] Patient Status:  Inpatient    1945 MRN [id number]   [facility] 4NW-A Attending  Ashleigh Cano MD    Hosp Day # 16 PCP Arcenio Staples MD     Chief Complaint:      S: feels pain w/ TPA inf Labs   Lab  10/20/18   0525  10/21/18   0514  10/22/18   0518  10/23/18   0535  10/24/18   0635   GLU  118*  108*  113*  102*  109*   BUN  23*  21*  17  15  18   CREATSERUM  0.70  0.64  0.51*  0.57  0.62   GFRAA  100  103  111  107  104   GFRNAA  87  89  9 4. Ascites/Peritonitis s/p paracentesis 10/8  1. Continue abx per ID  2. s/p Paracentesis  X1  3. Repeat paracentesis  10/25 to assess  5. leiomyosarcoma  Stage 4  s/p [facility] 9/2108  1. Onc following      2.  Cancer progressing on recent ct scan  - infecti multiple occasions.      Mindy Sparks MD

## 2018-10-24 NOTE — OCCUPATIONAL THERAPY NOTE
OCCUPATIONAL THERAPY TREATMENT NOTE - INPATIENT     Room Number: 542/636-T  Session: 1   Number of Visits to Meet Established Goals: 7    Presenting Problem: hypotension    History related to current admission:   Pt is 67year old female admitted on 10/7/2 Pleural effusion  Active Problems:     Morbid obesity with BMI of 40.0-44.9, adult (HCC)    Cellulitis    Other ascites    Acute saddle pulmonary embolism without acute cor pulmonale (HCC)    Peritonitis (HCC)    Leiomyosarcoma (HCC)    Secondary sarcoma of RESTRICTION  Weight Bearing Restriction: None                PAIN ASSESSMENT  Rating: Unable to rate  Location: c/o indigestion  Management Techniques:  Activity promotion;Repositioning;Breathing techniques     ACTIVITY TOLERANCE                         O2 her head feels heavy, requesting to lay back in bed. Pt performed sit>supine with supervision. Pt required min assist for repositioning in bed. Pt educated on and performed chin tuck exercises for neck strengthening.  Pt was left in bed with questions answe

## 2018-10-24 NOTE — PROGRESS NOTES
[facility]  Progress Note    Familia Contreras Patient Status:  Inpatient    1945 MRN [id number]   [facility] 4NW-A Attending Leroy Tinsley MD   [facility] Day # 16 PCP Pascual Navarro MD     Subjective:  Familia Contreras is a(n) 67 year ol 8. 6*  7.8*   HCT  21.2*   < >  29.7*  28.9*  27.1*   MCV  85.8   < >  90.3  90.3  94.4   MCH  27.5   < >  26.7*  26.9*  27.2   MCHC  32.1   < >  29.6*  29.8*  28.8*   RDW  18.8*   < >  20.2*  20.0*  19.9*   NEPRELIM  8.59*   --   5.05  4.93   --    WBC  1 the face of TPA.      Will restart TPA/dornase infusions with a goal of 4 treatments over 48 hours pending this evening's hemogram.   · Continued supportive care.   · Unfortunately the patient's underlying disease process-metastatic leiomyosarcoma-remains he

## 2018-10-24 NOTE — PLAN OF CARE
Pt is alert and oriented  X 4. Vitals stable. Pt hemoglobin this morning is 7.8 gm. Notified  with orders to go ahead with TPA  To chest tube. TPA instilled at 1005 by [facility] ([address] [facility] . C/o pain in the Rt lower chest after TPA.  2 tabs N

## 2018-10-24 NOTE — PROGRESS NOTES
[address] [facility]  TEL: [phone]  FAX: [phone]    Lynn Raymundo Patient Status:  Inpatient    1945 MRN [id number]   [facility] 4NW-A Attending Yoon Patel MD   [facility] Day # 16 PCP Romel Garcia, --    --    --    --    --    MON  5   --    --    --    --    --    --    EOS  2   --    --    --    --    --    --     < > = values in this interval not displayed.      Recent Labs   Lab  10/20/18   0525  10/21/18   2218  10/22/18   0518  10/23/18   0535 pt with stage IV uterine leiomyosarcoma  -ascitic fluid with significant amount of PMNs and e coli in cx. new cx from drainage from incision with amp c producing E coli.  assume pocket along incision was communicating with ascitic fluid, therefore ascitic f

## 2018-10-25 NOTE — PLAN OF CARE
SKIN/TISSUE INTEGRITY - ADULT    • Incision(s), wounds(s) or drain site(s) healing without S/S of infection Not Progressing          Patient/Family Goals    • Patient/Family Long Term Goal Progressing    • Patient/Family Short Term Goal Progressing

## 2018-10-25 NOTE — PROGRESS NOTES
[facility]  Progress Note    Phoebe Coello Patient Status:  Inpatient    1945 MRN [id number]   [facility] 5NW-A Attending Marily Hwang MD   [facility] Day # 25 PCP Regina Quintero MD     Subjective:  Phoebe Coello is a(n) 67 year ol 19.7*   --   18.9*   NEPRELIM  5.05  4.93   --    --    --   5.36   WBC  8.1  7.6  7.8  9.8   --   8.2   PLT  190.0  194.0  206.0  217.0   --   187.0     Recent Labs   Lab  10/23/18   0535  10/24/18   0635  10/25/18   0530   GLU  102*  109*  105*   BUN  15 bloody. · Continued supportive care. · Unfortunately the patient's underlying disease process-metastatic leiomyosarcoma-remains her biggest limiting factor.      · Patient now a DNR. · Attempt at paracentesis planned for later today.         Yisel Julio

## 2018-10-25 NOTE — PALLIATIVE CARE NOTE
[address] [facility] Follow Up      Mahendraneela Bailonma  [id number]       Patient seen and evaluated, no family at bedside.      ROS: denies complaints     Physical Exam:  GEN:  NAD  Neuro:  Awake and alert, oriented x3  Respiratory:  Respir

## 2018-10-25 NOTE — OCCUPATIONAL THERAPY NOTE
OCCUPATIONAL THERAPY TREATMENT NOTE - INPATIENT     Room Number: 552/715-S  Session: 2   Number of Visits to Meet Established Goals: 7    Presenting Problem: hypotension    History related to current admission: Pt is 67year old female admitted on 10/7/201 Pleural effusion  Active Problems:     Morbid obesity with BMI of 40.0-44.9, adult (HCC)    Cellulitis    Other ascites    Acute saddle pulmonary embolism without acute cor pulmonale (HCC)    Peritonitis (HCC)    Leiomyosarcoma (HCC)    Secondary sarcoma of 0  Location: denies at rest  Management Techniques:  Activity promotion;Repositioning     ACTIVITY TOLERANCE           BP: 108/45  BP Location: Left arm(forearm)  BP Method: Automatic  Patient Position: Lying    O2 SATURATIONS           Ambulation oxygen fl assist to reposition in bed. Pt was left in bed with questions answered, needs met and call light within reach. Pt's RN/PCT was made aware of pt's present position and condition.   Patient End of Session: In bed;Needs met;Call light within reach;RN aware of PROGRESSING  Patient will perform toileting: with min assist and with bedside commode PROGRESSING     Functional Transfer Goals  Patient will perform all functional transfers:  with min assist PROGRESSING    Additional Goals  Patient will be supervision fo

## 2018-10-25 NOTE — PROGRESS NOTES
[facility]  Progress Note     Phoebe Coello Patient Status:  Inpatient    1945 MRN [id number]   [facility] 4NW-A Attending  Luis Egan MD    Hosp Day # 25 PCP Regina Quintero MD     Chief Complaint:      S: awaiting paracentesis --    --    --    --    --    --    --    --    --    --    INR   --   1.41*   --   1.43*   --   1.30*   --   1.28*   --    --    --    --    --    --   1.35*    < > = values in this interval not displayed.        Recent Labs   Lab  10/21/18   0514   10/23/ 4. Repeat paracentesis to check for resolution of abscess   3. Bilat PE /LLE thrombus     1. heparin gtt on hold due to acute blood loss  2. IVC filter placed 10/19   4. Ascites/Peritonitis s/p paracentesis 10/8, repeat 10/25  For cultures   1.  Continue no    Lea Gilbert NP     Addendum:    Pt seen and examined this am.   She seems comfortable.      General: NADD  CVS: IR IR   RS: diminished on bases   abd : soft NT  Ext: + lymphedema , ++ edema in arms     Labs reviewed    Plan as stated above;  L;asi

## 2018-10-25 NOTE — PHYSICAL THERAPY NOTE
PHYSICAL THERAPY TREATMENT NOTE - INPATIENT    Room Number: 625/424-A     Session: 2   Number of Visits to Meet Established Goals: 5    Presenting Problem: Jadiel PE dx on 10/10; LLE thrombus; s/p IVC filter placed on 10/20/18; bleeding from unknown etiology placed, draining  10/20/18 IVC filter placed.     Therapy significant labs:  10/24/2018 Hgb = 7.8, Hct = 27.1  10/25/18 Hgb = 7.5, Hct = 24.9      Problem List  Principal Problem:    Pleural effusion  Active Problems: Morbid obesity with BMI of 40.0-44. was a prerequisite. \"    Patient’s self-stated goal is recover to her baseline. OBJECTIVE  Precautions: Chest tube; Limb alert - right;Low vision    WEIGHT BEARING RESTRICTION  Weight Bearing Restriction: None                PAIN ASSESSMENT   Ratin with min A to both sides to facilitate brief change. Pt rolled to R side independently, and achieved EOB sitting with minimal assist near the end of transfer. Pt performed sit>stand with CGA to RW. She ambulated forward and back 3ft x2.  Pt marched in place Comments: Goals established on 10/21/2018  Goals updated 10/24/18  Ongoing 10/25/18

## 2018-10-25 NOTE — PROGRESS NOTES
[address] [facility]  TEL: [phone]  FAX: [phone]    Moe Stockton Patient Status:  Inpatient    1945 MRN [id number]   [facility] 4NW-A Attending Billy Snowden MD   [facility] Day # 25 PCP Jennie Khan, < >  190.0  194.0  206.0  217.0   --   187.0   NEUT  72   --    --    --    --    --    --    --    LYMPH  9   --    --    --    --    --    --    --    MON  5   --    --    --    --    --    --    --    EOS  2   --    --    --    --    --    --    -- coli I to pip tazo, jo ann to CTX  Drainage abd incision cx amp C producing E coli    Radiology: reviewed  CXR stable    ASSESSMENT/ PLAN:     1.  infected malignant ascitis in pt with stage IV uterine leiomyosarcoma  -ascitic fluid with significant amount of

## 2018-10-25 NOTE — PLAN OF CARE
HEMATOLOGIC - ADULT    • Maintains hematologic stability Progressing    • Free from bleeding injury Progressing        Impaired Activities of Daily Living    • Achieve highest/safest level of independence in self care Progressing        Impaired Functional [facility])     Essential hypertension     Obesity, unspecified     Malignant neoplasm of thyroid gland ([facility] [address]     Encounter for long-term (current) use of other medications     Essential hypertension, benign     Hypothyroidism     Pure hypercholesterolemia     M

## 2018-10-26 NOTE — PROGRESS NOTES
[facility]  Progress Note    Samra Priest Patient Status:  Inpatient    1945 MRN [id number]   [facility] 5NW-A Attending Virgie Kyle MD   [facility] Day # 23 PCP Carlitos Owens MD     Subjective:  Samra Priest is a(n) 67 year ol 26.9*   < >  27.0  27.4  28.5   MCHC  29.8*   < >  30.1*  30.7*  32.0   RDW  20.0*   < >  18.9*  19.1*  18.0*   NEPRELIM  4.93   --   5.36   --   5.12   WBC  7.6   < >  8.2  8.6  7.6   PLT  194.0   < >  187.0  196.0  187.0    < > = values in this interval 10/18, 1 unit 10/19, 1 unit 10/20 and 2 units on 10/24. 1 unit 10/25 overnight  · Continued right chest tube drainage but it appears serosanguineous rather than bloody.   · Reviewed limited options with the patient for management of her loculated right effu

## 2018-10-26 NOTE — PROGRESS NOTES
Pt is alert and oriented x4. Chest tube -20 continous suction. Pt has discomfort at chest tube insertion site. 02 2L NC baseline in RA, lungs sound clear. Bottom and groin excorated, received order for magic butt paste. Remains on calorie count.  L picc in p

## 2018-10-26 NOTE — DIETARY NOTE
Nutrition Short Note- Calorie Count    MD ordered calorie count, which began 10/25. Pt was on clear liquid diet until late afternoon, so only dinner ticket recorded.     Day 1 (10/25)    Breakfast- clear liquids    Lunch- clear liquids    Dinner-  600 kcal,

## 2018-10-26 NOTE — PLAN OF CARE
Patient/Family Goals    • Patient/Family Long Term Goal Progressing    • Patient/Family Short Term Goal Progressing        RESPIRATORY - ADULT    • Achieves optimal ventilation and oxygenation Progressing        SKIN/TISSUE INTEGRITY - ADULT    • Incision(

## 2018-10-26 NOTE — PROGRESS NOTES
[facility]  Progress Note     Jae Lebron Patient Status:  Inpatient    1945 MRN [id number]   [facility] 4NW-A Attending  Rene Barry MD    Hosp Day # 23 PCP Michael Ortez MD     Chief Complaint:      S: tired og being here < >  15  18  22*  20   CREATSERUM  0.64   < >  0.57  0.62  0.83  0.67   GFRAA  103   < >  107  104  81  102   GFRNAA  89   < >  93  90  71  88   CA  7.0*   < >  6.9*  7.3*  6.9*  7.0*   ALB  2.1*   --   1.8*   --   1.6*   --    NA  141   < >  144  144  14 9/2108  1. Onc following      2. Cancer progressing on recent ct scan  - infection needs to be resolved to receive chemo   6. Right sided pleural effusion s/p thoracentesis 10/8  1. Pulm following  2. Culture from 10/08 with clostridium -   3.  repeat thor

## 2018-10-26 NOTE — PHYSICAL THERAPY NOTE
PHYSICAL THERAPY TREATMENT NOTE - INPATIENT    Room Number: 732/778-H     Session: 3   Number of Visits to Meet Established Goals: 5    Presenting Problem: Jadiel PE dx on 10/10; LLE thrombus; s/p IVC filter placed on 10/20/18; bleeding from unknown etiology draining  10/20/18 IVC filter placed.     Therapy significant labs:  10/24/2018 Hgb = 7.8, Hct = 27.1  10/25/18 Hgb = 7.5, Hct = 24.9  10/26/18 Hgb 8.3, Hct 25.9    Pt remains with chest tube on suction 10/26/18      Problem List  Principal Problem:    Ple good on TV. [place], that tiny girl is going to try and eat 25 hamburgers? ! What, and fries too? \"    Patient’s self-stated goal is get better. OBJECTIVE  Precautions: Limb alert - right;Limb alert - left; Chest tube; Low vision    WEIGHT BEARING RESTRICTION performed sit>stand at [facility] with CGA and ambulated forward and back 3ft x2. She performed standing exercises with UE support on RW. Pt resumed sitting with CGA. She performed seated UE with OT.  Pt performed sit<>stand to RW x3 further times with verbal cues recommendations.        Goal #5 Pt able to tolerate standing at RW at supervision x1 for 5min      Goal #6 Pt is able to demonstrate sit>stand at supervision x1 to RW      Goal Comments: Goals established on 10/21/2018; updated 10/24/18  Ongoing 10/26/18

## 2018-10-26 NOTE — PROGRESS NOTES
Pt is having serosanguinous fluid  from her chest tube, small amount.  She also was yesterday, monitoring closely

## 2018-10-26 NOTE — PROGRESS NOTES
[address] [facility]  TEL: [phone]  FAX: [phone]    Angelina Cahu Patient Status:  Inpatient    1945 MRN [id number]   [facility] 4NW-A Attending Shama Rolon MD   [facility] Day # 23 PCP Dulce Maria Gillette, 108*   < >  102*  109*  105*  98   BUN  21*   < >  15  18  22*  20   CREATSERUM  0.64   < >  0.57  0.62  0.83  0.67   GFRAA  103   < >  107  104  81  102   GFRNAA  89   < >  93  90  71  88   CA  7.0*   < >  6.9*  7.3*  6.9*  7.0*   ALB  2.1*   --   1.8* resulting in anemia, CT chest remains in place, cxr stable    Case and plan d/w pt.  Will follow    Yola Saucedo

## 2018-10-26 NOTE — OCCUPATIONAL THERAPY NOTE
OCCUPATIONAL THERAPY TREATMENT NOTE - INPATIENT     Room Number: 773/157-W  Session: 3   Number of Visits to Meet Established Goals: 7    Presenting Problem: hypotension    History related to current admission: Pt is 67year old female admitted on 10/7/201 40.0-44.9, adult ([facility] [address]    Cellulitis    Other ascites    Acute saddle pulmonary embolism without acute cor pulmonale (HCC)    Peritonitis (HCC)    Leiomyosarcoma (HCC)    Secondary sarcoma of right lung ([facility] [address]    Secondary sarcoma of liver ([facility] [address]    Chela Craig denies  Management Techniques:  Activity promotion;Repositioning     ACTIVITY TOLERANCE                         O2 SATURATIONS        SPO2 Ambulation on Oxygen: 97  Ambulation oxygen flow (liters per minute): 3    ACTIVITIES OF DAILY LIVING ASSESSMENT  AM-P assist to reposition HOB so pt can scoot self toward HOB. Pt refuses to participate in self-care activities with setup in bed. Pt was left in bed with questions answered, needs met and call light within reach.  Pt's RN/PCT was made aware of pt's present pos education; Compensatory technique education  Rehab Potential : Good  Frequency (Obs): 5x/week      OT Goals:  ADL Goals   Patient will perform lower body dressing:  with min assist PROGRESSING  Patient will perform toileting: with min assist and with bedsid

## 2018-10-26 NOTE — DIETARY NOTE
NUTRITION FOLLOW UP ASSESSMENT     Pt is at moderate nutrition risk.  Pt does not meet malnutrition criteria.     NUTRITION DIAGNOSIS/PROBLEM:  Increased nutrient needs related to increased demands of Catabolic process as evidenced by recent exlap and  surg likes milk, encouraged milk at all meals for additional protein. No n/v. Loose stools persist.  Pt \" staying away from gassy foods\". Pt has pleural effusion & bilat pulm emboli. 72/F admitted with pleural effusion.  Recent Abdominal Surgery on 9.25 LEO, GERA  Pager # [phone]

## 2018-10-27 NOTE — PROCEDURES
Jelly 23 Patient Status:  Inpatient    1945 MRN [id number]   [facility] 5NW-A Attending Berta Amor MD   Hosp Day # 21 PCP Chikis Madrid MD         Procedure Report    Pre-Operative Diagnosis: Symptomatic R

## 2018-10-27 NOTE — PROGRESS NOTES
[facility]  Progress Note    Moe Stockton Patient Status:  Inpatient    1945 MRN [id number]   [facility] 5NW-A Attending Yvrose Irizarry MD   [address] Day # 21 PCP Jennie Khan MD     Subjective:  Moe Stockton is a(n) 67 year ol 1.28*  1.35*   PTT  31.0  36.5*   --        Cultures: Pleural fluid 10/25 thus far no growth recent blood cultures remain negative    Radiology:  Chest x-ray overall improved aeration decreasing left effusion right improved from 10/22 minimal change from y [place]     Other ascites     Acute saddle pulmonary embolism without acute cor pulmonale (HCC)     Peritonitis (HCC)     Leiomyosarcoma (HCC)     Secondary sarcoma of right lung (HCC)     Secondary sarcoma of liver (HCC)     Secondary malignancy of omentu

## 2018-10-27 NOTE — PLAN OF CARE
Patient/Family Goals    • Patient/Family Short Term Goal Progressing        RESPIRATORY - ADULT    • Achieves optimal ventilation and oxygenation Progressing        Pt A/O x 3, no distress noted. Pt has no complaints of pain.  Scheduled medications given as

## 2018-10-27 NOTE — IMAGING NOTE
Dr Roxanna Interiano explained procedure to patient. Patient signed consent. Time out performed. Assisted patient in CT table, kept comfortable and secured during the procedure. Noted pre-existing chest chest tube secured prior to starting the procedure.  Patient to

## 2018-10-27 NOTE — PROGRESS NOTES
[facility]  Progress Note     Angelina Chau Patient Status:  Inpatient    1945 MRN [id number]   [facility] 5NW-A Attending Koby Hickey MD   Hosp Day # 21 PCP Dulce Maria Gillette MD     Chief Complaint: pleural effusion     S: Pa < >  143  143  145*   K  4.1   < >  4.4   < >  4.4  4.0  4.0   CL  109   < >  113*   < >  111  112*  112*   CO2  30.0   < >  28.0   < >  26.0  24.0  25.0   ALKPHO  70   --   82   --   85   --    --    AST  10*   --   10*   --   9*   --    --    ALT  7*   - s/p [facility] 9/2108  1. Onc following      2. Plan for possible palliative chemo post infectious/acute issues  7. Acute/ chronic anemia with  acute blood loss anemia  1. S/p transfusion  10/18,19, 10/20 , 10/24    2. Stool occult blood +   3. Hgb stable   8.

## 2018-10-27 NOTE — PROCEDURES
[facility]  Pre-Procedure Note    Name: Valrie Felty  MRN#: [id number]  : 1945    Procedure:  CT guided chest tube placement    Indication:  Symptomatic Pleural Effusions  Metastatic leiomyosarcoma.  Request for second right chest tube    Al

## 2018-10-27 NOTE — PHYSICAL THERAPY NOTE
Physical Therapy    Attempted to treat pt 2x today, but refusing secondary to being NPO for plan to go for IR placement of chest tube above loculated R effusion.   Obtained appropriate commode for pt, attempted to have pt participate in minimal oob activity

## 2018-10-28 NOTE — PROGRESS NOTES
[facility]  Progress Note     Lynda Dhaliwal Patient Status:  Inpatient    1945 MRN [id number]   [facility] 5NW-A Attending Patricia Loyola MD   [facility] Day # 21 PCP Yaw Gibbs MD     Chief Complaint: Afib RVR yesterday afterno 1.8*   --   1.6*   --    --    --   1.7*   NA  144   < >  143  143  145*   --   144   K  4.4   < >  4.4  4.0  4.0  4.0  4.1   CL  113*   < >  111  112*  112*   --   111   CO2  28.0   < >  26.0  24.0  25.0   --   28.0   ALKPHO  82   --   85   --    --    -- chronic anemia with  acute blood loss anemia  1. Hgb stable   8. Hypothyroid  1. Continue levothyroxine   9. Chronic lymphedema of LE  10. Chronic Afib  1. Rate controlled with BB  2.  Heparin gtt  Stopped  due to anemia , resume within the next 24 hours

## 2018-10-28 NOTE — PROGRESS NOTES
[facility]  Progress Note    Frankey Sofia Patient Status:  Inpatient    1945 MRN [id number]   [facility] 5NW-A Attending Denis Breaux MD   [facility] Day # 21 PCP Debra Zarate MD     Subjective:  Frankey Sofia is a(n) 67 year ol Cultures: reviewed     Radiology:  Without much change from prior to new CT       Medications reviewed     Assessment and Plan:   Patient Active Problem List:     Disorder of bursae and tendons in shoulder region     Venous (peripheral) insufficiency anemia     Goals of care, counseling/discussion     Palliative care encounter       1. Bilateral pulmonary emboli-suspected pelvic origin-currently off anticoagulation secondary to bleeding and transfusion requirements. Status post IVC filter  2.  Large, p

## 2018-10-28 NOTE — PHYSICAL THERAPY NOTE
PHYSICAL THERAPY TREATMENT NOTE - INPATIENT    Room Number: 033/446-T     Session: 4   Number of Visits to Meet Established Goals: 5    Presenting Problem: Jadiel PE dx on 10/10; LLE thrombus; s/p IVC filter placed on 10/20/18; bleeding from unknown etiology liver ([address]    Secondary malignancy of omentum ([address]    Hypotension due to hypovolemia    Hypovolemic shock (HCC)    Acute blood loss anemia    Goals of care, counseling/discussion    Palliative care encounter      Past Medical History  Past Medical Histor TOLERANCE           BP: (!) 84/42  BP Location: Radial  BP Method: Automatic  Patient Position: Lying    O2 WALK                    AM-PAC '6-Clicks' INPATIENT SHORT FORM - BASIC MOBILITY  How much difficulty does the patient currently have. ..  -   Turning addressed    ASSESSMENT   Mobility limited by hypotension this session; however, pt demonstrates good motivation to participate in therapy.  Pt continues to present below baseline with impairments including decreased activity tolerance, decreased balance, d

## 2018-10-28 NOTE — PROGRESS NOTES
Bob Wilkes  QXN#:[id number]  :1945      Subjective: In bed. Breathing only marginally better. SOB when getting on commode. Objective:  New right chest tube site is clean, dry and intact.       Vital Signs:  Blood pressure 100/52, pul

## 2018-10-29 NOTE — PROGRESS NOTES
[facility]  Progress Note      Mark Anthony Caal Patient Status:  Inpatient    1945 MRN [id number]   [facility] 5NW-A Attending Gennaro Louis MD   [facility] Day # 25 PCP Ramos Smith MD     67year-old female with loculated right pleur

## 2018-10-29 NOTE — PROGRESS NOTES
Spoke to [place] , informed her of blood pressure of 85/52, she said take on left forearm, pt states she feels fine,

## 2018-10-29 NOTE — PROGRESS NOTES
[facility]  Progress Note     Susana Gottliebdmont Patient Status:  Inpatient    1945 MRN [id number]   [facility] 5NW-A Attending Dion Grissom MD   [facility] Day # 25 PCP Arcenio Staples MD     Chief Complaint: Afib RVR yesterday afterno 88   --   92  92   CA  6.9*   < >  6.9*   < >  6.8*   --   7.0*  6.8*   ALB  1.8*   --   1.6*   --    --    --   1.7*   --    NA  144   < >  143   < >  145*   --   144  142   K  4.4   < >  4.4   < >  4.0  4.0  4.1  4.2   CL  113*   < >  111   < >  112* drainage  6. leiomyosarcoma  Stage 4  s/p [facility] 9/2108  1.  Onc following      2. Plan for possible palliative chemo post infectious/acute issues  7.   Acute/ chronic anemia with  acute blood loss anemia  1. Hgb  Lower again  2. Transfuse today    8.  Hypothy

## 2018-10-29 NOTE — PROGRESS NOTES
[facility]  Progress Note    Chi Summers Patient Status:  Inpatient    1945 MRN [id number]   [facility] 5NW-A Attending Portillo Desai MD   Hosp Day # 25 PCP Deedee Ordaz MD     1.  Bilateral pulmonary emboli-suspected pel Essential hypertension     Obesity, unspecified     Malignant neoplasm of thyroid gland ([facility] [address]     Encounter for long-term (current) use of other medications     Essential hypertension, benign     Hypothyroidism     Pure hypercholesterolemia     Malignant n 93/45, pulse 85, temperature 97.9 °F (36.6 °C), temperature source Oral, resp. rate 20, height 5' 5\" (1.651 m), weight 283 lb 3.2 oz (128.5 kg), last menstrual period 12/14/2012, SpO2 99 %, not currently breastfeeding.     Intake/Output:    Intake/Output S --   0.5   --    --    --   0.7   --    TP  4.6*   --   4.1*   --    --    --   4.2*   --     < > = values in this interval not displayed.      @MG@    Lab Results   Component Value Date    INR 1.35 (H) 10/25/2018    INR 1.28 (H) 10/22/2018    INR 1.30 (H) Metoclopramide HCl (REGLAN) injection 10 mg 10 mg Intravenous Q8H PRN   PEG 3350 (MIRALAX) powder packet 17 g 17 g Oral Daily PRN   magnesium hydroxide (MILK OF MAGNESIA) 400 MG/5ML suspension 30 mL 30 mL Oral Daily PRN   bisacodyl (DULCOLAX) rectal supp

## 2018-10-29 NOTE — PROGRESS NOTES
[address] [facility]  TEL: [phone]  FAX: [phone]    Shar Ray Patient Status:  Inpatient    1945 MRN [id number]   [facility] 4NW-A Attending Elvie Arora MD   [address] [facility] Day # 25 PCP Ashley Cunningham, 0.83   < >  0.68   --   0.59  0.58   GFRAA  107   < >  81   < >  101   --   106  106   GFRNAA  93   < >  71   < >  88   --   92  92   CA  6.9*   < >  6.9*   < >  6.8*   --   7.0*  6.8*   ALB  1.8*   --   1.6*   --    --    --   1.7*   --    NA  144   < > malignant ascitis in pt with stage IV uterine leiomyosarcoma  -ascitic fluid with significant amount of PMNs and e coli in cx. new cx from drainage from incision with amp c producing E coli.  assume pocket along incision was communicating with ascitic fluid

## 2018-10-29 NOTE — PROGRESS NOTES
Pt is alert and oriented x 4. Pt is on 022L NC  sats >98%. Pt with two chest tubes in place both on right side, the newer chest tube is on her right chest, the older chest tube on on right flank/back.  The newer chest tube had 350 cc output NOC, the older c

## 2018-10-29 NOTE — DIETARY NOTE
Nutrition Short Note- Calorie Count     MD ordered calorie count, which began 10/25.  Pt was on clear liquid diet until late afternoon, so only dinner ticket recorded.     Day 1 (10/25)     Breakfast- clear liquids     Lunch- clear liquids     Dinner-  600

## 2018-10-30 NOTE — PROGRESS NOTES
Patient is alert,oriented. No reports of pain. Right side with two chest tubes observed. Lower chest tube flushed this shift per MD orders. Tele is A-fib. Right hand elevated related to edema. PICC site is C/D/I,will continue to monitor.

## 2018-10-30 NOTE — DIETARY NOTE
Nutrition Short Note- Calorie Count     MD ordered calorie count, which began 10/25.  Pt was on clear liquid diet until late afternoon, so only dinner ticket recorded.     Day 1 (10/25)     Breakfast- clear liquids     Lunch- clear liquids     Dinner-  415

## 2018-10-30 NOTE — PLAN OF CARE
HEMATOLOGIC - ADULT    • Maintains hematologic stability Progressing    • Free from bleeding injury Progressing        Impaired Activities of Daily Living    • Achieve highest/safest level of independence in self care Progressing        Impaired Functional 40.0-44.9, adult ([facility] [address]     Gout     Idiopathic gout of right knee     Primary osteoarthritis of right knee     Cellulitis     Acute renal failure (ARF) (HCC)     Lymphedema     DM type 2 with diabetic peripheral neuropathy (HCC)     Metabolic alkalosis

## 2018-10-30 NOTE — PROGRESS NOTES
[facility]  Progress Note     Amy Antonio Patient Status:  Inpatient    1945 MRN [id number]   [facility] 5NW-A Attending Amrit Inman MD   [facility] Day # 21 PCP Abe Delaney MD     Chief Complaint: Afib RVR yesterday afterno 20   CREATSERUM  0.83   < >  0.68   --   0.59  0.58   GFRAA  81   < >  101   --   106  106   GFRNAA  71   < >  88   --   92  92   CA  6.9*   < >  6.8*   --   7.0*  6.8*   ALB  1.6*   --    --    --   1.7*   --    NA  143   < >  145*   --   144  142   K  4. anemia from CT drainage  Using left lower arm for cuff readings   6. leiomyosarcoma  Stage 4  s/p [facility] 9/2108  1.  Onc following      2.  Plan for possible palliative chemo post infectious/acute issues  7.   Acute/ chronic anemia with  acute blood loss anemi

## 2018-10-30 NOTE — PROGRESS NOTES
[facility]  Progress Note    Manisha Miguel Patient Status:  Inpatient    1945 MRN [id number]   [facility] 5NW-A Attending Barbra Gross MD   [address] Day # 21 PCP Rivka Fleming MD     Subjective:  Manisha Miguel is a(n) 67 year ol 91.5   MCH  27.7  27.7  28.0   --    --   27.4   MCHC  30.2*  30.6*  30.4*   --    --   30.0*   RDW  18.6*  19.0*  18.7*   --    --   18.6*   NEPRELIM  4.04  4.53  4.25   --    --    --    WBC  6.4  6.9  6.5   --    --   7.4   PLT  175.0  181.0  171.0   -- further drainage procedures but we will review her CT scan and advise.         Sienna Lemons SR.  10/30/2018  4:19 PM

## 2018-10-30 NOTE — PHYSICAL THERAPY NOTE
PHYSICAL THERAPY TREATMENT NOTE - INPATIENT    Room Number: 469/603-J     Session: 5   Number of Visits to Meet Established Goals: 5    Presenting Problem: Jadiel PE dx on 10/10; LLE thrombus; s/p IVC filter placed on 10/20/18; bleeding from unknown etiology liver ([address]    Secondary malignancy of omentum ([address]    Hypotension due to hypovolemia    Hypovolemic shock (HCC)    Acute blood loss anemia    Goals of care, counseling/discussion    Palliative care encounter      Past Medical History  Past Medical Histor applicable    ACTIVITY TOLERANCE                        O2 WALK                    AM-PAC '6-Clicks' INPATIENT SHORT FORM - BASIC MOBILITY  How much difficulty does the patient currently have. ..  -   Turning over in bed (including adjusting bedclothes, she strength. Pt will continue to benefit from skilled PT services in order to progress towards functional goals and maximize functional independence. Subacute rehab is recommended for 12-14 days.   After this period of rehabilitation patient should achieve

## 2018-10-30 NOTE — PHYSICAL THERAPY NOTE
Chart reviewed and discussed with RN. Attempted to see pt for there ex in bed only, however first attempt pt occupied with nursing staff for bed pan and perianal cleaning. Second attempt, pt with APN. Will attempt to see pt when time allows.  CM    Discusse

## 2018-10-30 NOTE — PROGRESS NOTES
10/30/18 5690   Clinical Encounter Type   Visited With Patient   Routine Visit ( responded to consult regarding advance directives.)      located existing [facility] within patient's electronic medical record dated Nov. 7, 2014, naming Rao Grimm

## 2018-10-30 NOTE — PROGRESS NOTES
[address] [facility]  TEL: [phone]  FAX: [phone]    Kimberly Diaz Patient Status:  Inpatient    1945 MRN [id number]   [facility] 4NW-A Attending Moiz Salas MD   [facility] Day # 21 HOLLAND Cantu, 105*   < >  101*   --   96  104*   BUN  22*   < >  20   --   16  20   CREATSERUM  0.83   < >  0.68   --   0.59  0.58   GFRAA  81   < >  101   --   106  106   GFRNAA  71   < >  88   --   92  92   CA  6.9*   < >  6.8*   --   7.0*  6.8*   ALB  1.6*   --    - leiomyosarcoma  -ascitic fluid with significant amount of PMNs and e coli in cx. new cx from drainage from incision with amp c producing E coli.  assume pocket along incision was communicating with ascitic fluid, therefore ascitic fluid may have had  an amp

## 2018-10-31 NOTE — PLAN OF CARE
HEMATOLOGIC - ADULT    • Maintains hematologic stability Progressing    • Free from bleeding injury Progressing        Impaired Activities of Daily Living    • Achieve highest/safest level of independence in self care Progressing        Impaired Functional adult [facility])     Gout     Idiopathic gout of right knee     Primary osteoarthritis of right knee     Cellulitis     Acute renal failure (ARF) (HCC)     Lymphedema     DM type 2 with diabetic peripheral neuropathy (HCC)     Metabolic alkalosis     Respirator

## 2018-10-31 NOTE — PROGRESS NOTES
Truong Winglisa  [facility]#:[id number]  :1945      Subjective: In bed without complaint. Objective:  New right chest tube site is clean, dry and intact.       Vital Signs:  Blood pressure 100/55, pulse 89, temperature 98 °F (36.7 °C), temperatur

## 2018-10-31 NOTE — PROGRESS NOTES
[facility]  Progress Note    Jody Houser Patient Status:  Inpatient    1945 MRN [id number]   [facility] 5NW-A Attending Edd Barber MD   [facility] Day # 25 PCP Susannah Elmore MD     Assessment and Plan:     1. Bilateral pulmonary Swelling of limb     Cervicalgia     Screening for malignant neoplasm of the cervix     Osteoarthritis     Type II diabetes mellitus, uncontrolled (HCC)     Essential hypertension     Obesity, unspecified     Malignant neoplasm of thyroid gland ([address]     E Device: Nasal cannula  O2 Flow Rate (L/min): 2 L/min  Pulse Oximetry Type: Continuous  Oximetry Probe Site Changed: Yes  Pulse Ox Probe Location: Left hand  Blood pressure 100/55, pulse 89, temperature 98 °F (36.7 °C), temperature source Oral, resp.  rate 1 25.0   --   28.0  26.0  25.0   ALKPHO  85   --    --    --   98   --   116   AST  9*   --    --    --   10*   --   10*   ALT  7*   --    --    --   7*   --   <6*   BILT  0.5   --    --    --   0.7   --   0.8   TP  4.1*   --    --    --   4.2*   --   4.6* Intravenous Q6H PRN   Metoclopramide HCl (REGLAN) injection 10 mg 10 mg Intravenous Q8H PRN   PEG 3350 (MIRALAX) powder packet 17 g 17 g Oral Daily PRN   magnesium hydroxide (MILK OF MAGNESIA) 400 MG/5ML suspension 30 mL 30 mL Oral Daily PRN   bisacodyl (D

## 2018-10-31 NOTE — DIETARY NOTE
NUTRITION FOLLOW UP ASSESSMENT     Pt is at moderate nutrition risk.  Pt does not meet malnutrition criteria.     NUTRITION DIAGNOSIS/PROBLEM:  Increased nutrient needs related to increased demands of Catabolic process as evidenced by recent exlap and  surg to monitor daily for skylar count.     10/22-  Pt continuing to eat well. Pt wt up 5 kg since last evaluation.       10/16 - Pt occupied with RN at time of visit. PO remains good % with good sized meals.  Not ordering milk at all meals as discussed. Avtar Rubi Addresses: Yes     NUTRITION RELATED PHYSICAL FINDINGS:     1. Body Fat/Muscle Mass: Morbid obesity     2.  Fluid Accumulation: Lymphedema     NUTRITION PRESCRIPTION:  Calories: 4149-7048 calories/day (30-35 calories per kg/IBW)  Protein:  grams prote

## 2018-10-31 NOTE — PROGRESS NOTES
Pt complaining of SOB, increased work of breathing. Chest tubes unclamped, back to suction. Pulfuentes SALDANA paged. O2 sats WNL. Per Dr. Munoz Gravely- clamp lower chest tube, keep upper chest tube to suction.

## 2018-10-31 NOTE — PROGRESS NOTES
[facility]  Progress Note     Denisha Jurado Patient Status:  Inpatient    1945 MRN [id number]   [facility] 5NW-A Attending Aj Valdez MD   Hosp Day # 25 PCP Bigg Ko MD     Chief Complaint:  SOB   S:     Frustrated w 105*   < >  96  104*  157*   BUN  22*   < >  16  20  24*   CREATSERUM  0.83   < >  0.59  0.58  0.71  0.71   GFRAA  81   < >  106  106  98  98   GFRNAA  71   < >  92  92  85  85   CA  6.9*   < >  7.0*  6.8*  7.1*   ALB  1.6*   --   1.7*   --   1.8*   NA  14 left lower arm for cuff readings   6. leiomyosarcoma  Stage 4  s/p [facility] 9/2108  1.  Onc following      2. Plan for possible palliative chemo post infectious/acute issues Dr Samir Jiang to see pt today  3.  Ct scan shows progression of her cancer   7.   Acute/ ch    · [place]: no     Estimated date of discharge: TBD  Discharge is dependent on: course  At this point Ms. Gume Hagan is expected to be discharge to: TBD     Plan of care discussed with patient and RN. Keshia Rosa NP       ADDENDUM:  Pt seen and examined.

## 2018-10-31 NOTE — PROGRESS NOTES
[facility]  TEL: [phone]  FAX: [phone]    Trinajohn Abkarmarshall Patient Status:  Inpatient    1945 MRN [id number]   [facility] 4NW-A Attending Osvaldo Fiore MD   [facility] Day # 25 PCP Maya Lord, 10/31/18   0553   GLU  105*   < >  96  104*  157*   BUN  22*   < >  16  20  24*   CREATSERUM  0.83   < >  0.59  0.58  0.71  0.71   GFRAA  81   < >  106  106  98  98   GFRNAA  71   < >  92  92  85  85   CA  6.9*   < >  7.0*  6.8*  7.1*   ALB  1.6*   --   1. visible pulmonary arterial thrombus or attenuation. ROSA: Large right perihilar/infrahilar mass adjacent to the large anterior mediastinal mass measures approximately 10.7 x 8.3 cm was 8.9 x 7.1 cm.   MEDIASTINUM: Large anterior mediastinal mass measures paracentesis  - cont unasyn for coverage for clostridium in pleural fluid   -she wants to try chemo, palliative care notes reviewed.  Plan will be for chemo once infection resolved     2. R sided pleural effusion  - assume malignant in view of her history,

## 2018-11-01 NOTE — PROGRESS NOTES
[address] [facility]  TEL: [phone]  FAX: [phone]    Mariano Hendricks Patient Status:  Inpatient    1945 MRN [id number]   [facility] 4NW-A Attending Tano Rosales MD   [address] [facility] Day # 22 PCP Jorge Alberto Mobley, 11/01/18   0614   GLU  96  104*  157*  139*   BUN  16  20  24*  30*   CREATSERUM  0.59  0.58  0.71  0.71  0.84   GFRAA  106  106  98  98  80   GFRNAA  92  92  85  85  70   CA  7.0*  6.8*  7.1*  7.5*   ALB  1.7*   --   1.8*  2.0*   NA  144  142  142  144 but not enough fluid and incision seems to have minimal drainage now.  CT 10/19 noted but not enough fluid per US on 10/25 for new paracentesis  - cont unasyn for coverage for clostridium in pleural fluid      2. R sided pleural effusion  - assume malignant

## 2018-11-01 NOTE — PROGRESS NOTES
[facility]  Progress Note     Jody Houser Patient Status:  Inpatient    1945 MRN [id number]   [facility] 5NW-A Attending Edd Barber MD   Hosp Day # 25 PCP Susannah Elmore MD     Chief Complaint:  SOB   S:     Feels breath CO2  28.0  26.0  25.0  27.0   ALKPHO  98   --   116  119   AST  10*   --   10*  10*   ALT  7*   --   <6*  8*   BILT  0.7   --   0.8  0.7   TP  4.2*   --   4.6*  4.8*          Imaging: Imaging data reviewed in Epic.     Medications:   • fosaprepitant (EMEN lymphedema of LE, RUE    Reduced today    10. Chronic Afib  1. Rate controlled with BB  2. BB  3. lovenox daily    4. No AC meds due to bleeding w/ TPA   11. Volume overload  due to acute on chronic diastolic HF -   1. cont daily lasix PO   2.  Edema reduce

## 2018-11-01 NOTE — PROGRESS NOTES
[facility]  Progress Note    Je More Patient Status:  Inpatient    1945 MRN [id number]   [facility] 4NW-A Attending Yajaira Escobar MD   [facility] Day # 25 PCP Barak Armijo MD     Subjective:  Je More is a(n) 67 year ol hours.    Cultures: Last fluid collection negative-reviewed    Radiology:  Chest x-ray today with right densities about the same- left pleural effusion seems about the same no new focal infiltrate on the left  CT scans reviewed some improvement in the uppe lung ([address]     Secondary sarcoma of liver ([address]     Secondary malignancy of omentum ([address]     Uterine leiomyosarcoma ([address]     Hypotension due to hypovolemia     Hypovolemic shock (HCC)     Acute blood loss anemia     Goals of care, counseling/discussion clinical course long-term may require Pleurx.       Elizabeth Urbina MD  11/1/2018  1:10 PM

## 2018-11-01 NOTE — PROGRESS NOTES
[facility]  Progress Note      Melissa Ny Patient Status:  Inpatient    1945 MRN [id number]   [facility] 5NW-A Attending Jessica Ward MD   [address] Day # 22 PCP Galen Rocha MD     67year-old female with metastatic leiomyosar

## 2018-11-01 NOTE — PROGRESS NOTES
[facility] Progress Note      Patient Name: Stef Leo   YOB: 1945  Medical Record Number: [id number]  Attending Physician: Rani Salcedo.  Salo Zhao M.D.       SUBJECTIVE:  Kimberly Diaz is a(n) 67year old female with n 33.2 pg    MCHC 30.2 (L) 31.0 - 37.0 g/dL    RDW 18.6 (H) 11.5 - 16.0 %    RDW-SD 62.2 (H) 35.1 - 46.3 fL    Neutrophil Absolute Prelim 8.21 (H) 1.30 - 6.70 x10 (3) uL    Neutrophil Absolute 8.21 (H) 1.30 - 6.70 x10(3) uL    Lymphocyte Absolute 1.08 0.90 - Intravenous Once   [COMPLETED] alteplase (ACTIVASE) 2 mg in Sterile Water for Injection 2.2 mL IV push 2 mg Intravenous Once   [COMPLETED] magnesium oxide (MAG-OX) tab 400 mg 400 mg Oral Once   Vitamins A & D 30 g, Alum & Mag Hydroxide-Simeth (MAALOX) 30 m [COMPLETED] Heparin Sodium (Porcine) 5000 UNIT/ML injection      [COMPLETED] magnesium oxide (MAG-OX) tab 800 mg 800 mg Oral Once   morphINE sulfate (PF) 4 MG/ML injection 1 mg 1 mg Intravenous Q12H PRN   [COMPLETED] sodium chloride 0.9% IV bolus 500 mL Q6H   [COMPLETED] Potassium Chloride ER (K-DUR M20) CR tab 40 mEq 40 mEq Oral Once   [COMPLETED] magnesium oxide (MAG-OX) tab 400 mg 400 mg Oral Once   [COMPLETED] furosemide (LASIX) injection 40 mg 40 mg Intravenous Once   Acidophilus/Pectin (PROBIOTIC) C 3350 (MIRALAX) powder packet 17 g 17 g Oral Daily PRN   magnesium hydroxide (MILK OF MAGNESIA) 400 MG/5ML suspension 30 mL 30 mL Oral Daily PRN   bisacodyl (DULCOLAX) rectal suppository 10 mg 10 mg Rectal Daily PRN   FLEET ENEMA (FLEET) 7-19 GM/118ML enema improve without improvement of her underlying malignancy. Will discuss with ID risks of proceeding with chemotherapy now. Patient is aware of the infectious risks if we do give chemotherapy now.  Patient has had an echocardiogram and has a PICC line in plac

## 2018-11-01 NOTE — CONSULTS
[facility]  Report of Inpatient Wound Care Consultation    Melissa Ny Patient Status:  Inpatient    1945 MRN [id number]   [facility] 5NW-A Attending Jessica Ward MD   [address] Day # 25 PCP Galen Rocha MD     Reason for [facility] or use drugs.       Allergies:  @ALLERGY    Laboratory Data:    Recent Labs   Lab  10/28/18   0630  10/29/18   0630   10/30/18   0545  10/31/18   0553  11/01/18   0614   WBC  6.9  6.5   --   7.4  6.8  10.2   HGB  7.6*  7.0*   < >  7.1*  7.1*  7.0*   HCT  24

## 2018-11-01 NOTE — OCCUPATIONAL THERAPY NOTE
OCCUPATIONAL THERAPY TREATMENT NOTE - INPATIENT     Room Number: 069/564-E  Session: 4   Number of Visits to Meet Established Goals: 7    Presenting Problem: hypotension    History related to current admission: Pt is 67year old female admitted on 10/7/201 medical history (procedures, illnesses, hospitalizations of past 6 months):  [facility] admission: 9/25/18-10/3/18 for above noted procedure  8/2-8/4/18: KAMRYN  2/24-5/1/18: pneumonia, respiratory distress.   10/15/2018 - chest tube placed, draining  10/ BILAT      07/2010 STEREO BX. BN.   • RADIATION RIGHT     • THYROIDECTOMY  1995    Total       SUBJECTIVE  \"I need to use the bed pan. \" Pt encouraged to perform t/f to bedside commode instead, \"I guess I could try. \"    Patient self-stated goal is none about ~5 min while OT/PT prepared lines for room mobility and transfers. Pt performed sit>stand from EOB using RW with CG assist. Pt performed t/f to bedside commode with CG assist and verbal cues for hand placement and safety.  Pt performed toileting with education  Rehab Potential : Good  Frequency (Obs): 5x/week      OT Goals:  ADL Goals   Patient will perform lower body dressing:  with min assist PROGRESSING  Patient will perform toileting: with min assist and with bedside commode PROGRESSING     Functio

## 2018-11-01 NOTE — PHYSICAL THERAPY NOTE
PHYSICAL THERAPY TREATMENT NOTE - INPATIENT    Room Number: 582/662-I     Session: 6  Number of Visits to Meet Established Goals: 10    Presenting Problem: Jadiel PE dx on 10/10; LLE thrombus; s/p IVC filter placed on 10/20/18; bleeding from unknown etiology liver ([address]    Secondary malignancy of omentum ([address]    Hypotension due to hypovolemia    Hypovolemic shock (HCC)    Acute blood loss anemia    Goals of care, counseling/discussion    Palliative care encounter      Past Medical History  Past Medical Histor Standing: Fair -  Dynamic Standing: Poor +    ACTIVITY TOLERANCE           BP: 136/80  BP Location: Left arm  BP Method: Automatic  Patient Position: Sitting    O2 WALK        SPO2 Ambulation on Oxygen: 97  Ambulation oxygen flow (liters per minute): 3 static stance for prolonged time for kathryn care and to don brief. Pt t/f commode to chair c CGA. Pt c/o being unable to scoot back in chair. Pt sit>stand c min A and cued to sit deeper into chair c good carry over. Pt performed seated therex for BLE.  Pt le sit>stand at supervision x1 to RW  Progressing       Goal Comments: Goals established on 10/21/2018; updated ; Ongoing 11/1/18

## 2018-11-02 NOTE — PROGRESS NOTES
Pt chest tube unclamped as ordered to suction by Dr Alexander Chavira. New markings written for closer outpt on the 2 chest tubes.

## 2018-11-02 NOTE — PHYSICAL THERAPY NOTE
PHYSICAL THERAPY TREATMENT NOTE - INPATIENT    Room Number: 230    Session: 7  Number of Visits to Meet Established Goals: 10    Presenting Problem: Jadiel PE dx on 10/10; LLE thrombus; s/p IVC filter placed on 10/20/18; bleeding from unknown etiology - [facility])    Secondary malignancy of omentum ([address]    Hypotension due to hypovolemia    Hypovolemic shock (HCC)    Acute blood loss anemia    Goals of care, counseling/discussion    Palliative care encounter      Past Medical History  Past Medical History:   D Static Sitting: Fair -  Dynamic Sitting: Fair -           Static Standing: Not tested  Dynamic Standing: Not tested    ACTIVITY TOLERANCE           BP: 120/76        Patient Position: Sitting    O2 WALK                    AM-PAC '6-Clicks' INPATIENT SHORT concerns addressed    ASSESSMENT   Pt able to tolerate seated static and dynamic activity for BUE, BLE. Session modified 2/2 low Hg and pt feeling LH/dizzy. Subacute rehab is recommended for 12-14 days.   After this period of rehabilitation patient should

## 2018-11-02 NOTE — PLAN OF CARE
HEMATOLOGIC - ADULT    • Maintains hematologic stability Progressing    • Free from bleeding injury Progressing        RESPIRATORY - ADULT    • Achieves optimal ventilation and oxygenation Progressing          A&O times 4. Vitals stable, afebrile.   No com

## 2018-11-02 NOTE — PROGRESS NOTES
[address] [facility]  TEL: [phone]  FAX: [phone]    Chi Summers Patient Status:  Inpatient    1945 MRN [id number]   [facility] 4NW-A Attending Noemi Urbina MD   [facility] Day # 32 PCP Deedee Ordaz, 70  62   CA  7.0*   < >  7.1*  7.5*  7.3*   ALB  1.7*   --   1.8*  2.0*   --    NA  144   < >  142  144  145*   K  4.1   < >  4.2  3.9  3.9   CL  111   < >  108  110  110   CO2  28.0   < >  25.0  27.0  28.0   ALKPHO  98   --   116  119   --    AST  10*   -

## 2018-11-02 NOTE — PROGRESS NOTES
[facility]  Progress Note    Mariano Hendricks Patient Status:  Inpatient    1945 MRN [id number]   [facility] 4NW-A Attending Nawaf Gordon MD   [facility] Day # 32 PCP Jorge Alberto Mobley MD     Subjective:  Mariano Hendricks is a(n) 67 year ol Screening for malignant neoplasm of the cervix     Osteoarthritis     Type II diabetes mellitus, uncontrolled ([address]     Essential hypertension     Obesity, unspecified     Malignant neoplasm of thyroid gland ([address]     Encounter for long-term (current) use right-sided pleural effusion-from 9/26-- exudative with clostridium 10/8/18 -now status post second tube placed in the loculated collection cephalad-minimal drainage from the lower tube and it is now clamped-noted increased dyspnea with upper tube clamped

## 2018-11-02 NOTE — PROGRESS NOTES
[facility] Progress Note      Patient Name: Jaimee Reddy   YOB: 1945  Medical Record Number: [id number]  Attending Physician: Ines Johnston.  Darnell Weller M.D.       SUBJECTIVE:  Maurilio Fuentes is a(n) 67year old female with n %    .0 150.0 - 450.0 10(3)uL    MCV 93.2 81.0 - 100.0 fL    MCH 27.7 27.0 - 33.2 pg    MCHC 29.7 (L) 31.0 - 37.0 g/dL    RDW 18.5 (H) 11.5 - 16.0 %    RDW-SD 63.3 (H) 35.1 - 46.3 fL    Neutrophil Absolute Prelim 8.89 (H) 1.30 - 6.70 x10 (3) uL    N SOSY 480 mcg 480 mcg Subcutaneous [email or web address]   furosemide (LASIX) injection 40 mg 40 mg Intravenous Once   [COMPLETED] Fosaprepitant Dimeglumine (EMEND) 150 mg in sodium chloride 0.9 % 150 mL IVPB 150 mg Intravenous Once   [COMPLETED] Ondansetron HCl (ZOFR 400 mg 400 mg Oral Once   Vitamins A & D 30 g, Alum & Mag Hydroxide-Simeth (MAALOX) 30 mL, Zinc Oxide (DESITIN) 40 % 30 g  Magic butt cream  Topical Daily PRN   [COMPLETED] furosemide (LASIX) injection 20 mg 20 mg Intravenous Once   [COMPLETED] Albumin Hum injection 1 mg 1 mg Intravenous Q12H PRN   [COMPLETED] sodium chloride 0.9% IV bolus 500 mL 500 mL Intravenous Once   [COMPLETED] 0.9%  NaCl infusion  Intravenous Once   [COMPLETED] sodium chloride 0.9% IV bolus 500 mL 500 mL Intravenous Once   [COMPLETED] Acidophilus/Pectin (PROBIOTIC) CAPS 1 capsule 1 capsule Oral Daily   [COMPLETED] iohexol (OMNIPAQUE) 350 MG/ML injection 100 mL 100 mL Intravenous ONCE PRN   [COMPLETED] calcium chloride 2 g in sodium chloride 0.9% 100 mL IVPB 2 g Intravenous Once   [COM ENEMA (FLEET) 7-19 GM/118ML enema 133 mL 1 enema Rectal Once PRN   [COMPLETED] furosemide (LASIX) injection 40 mg 40 mg Intravenous Once       Assessment and Plan  1. Metastatic leiomyosarcoma: Received doxorubicin yesterday and tolerated well.  Patient h

## 2018-11-02 NOTE — PROGRESS NOTES
[facility]  Progress Note     Kimberly Diaz Patient Status:  Inpatient    1945 MRN [id number]   [facility] 5NW-A Attending Hipolito Rangel MD   [address] Day # 32 PCP Lennie Cantu MD     Chief Complaint:  SOB   S:  Ate too many ch 7.3*   ALB  1.7*   --   1.8*  2.0*   --    NA  144   < >  142  144  145*   K  4.1   < >  4.2  3.9  3.9   CL  111   < >  108  110  110   CO2  28.0   < >  25.0  27.0  28.0   ALKPHO  98   --   116  119   --    AST  10*   --   10*  10*   --    ALT  7*   --   < levothyroxine   9. Chronic lymphedema of LE, RUE        10. Chronic Afib  1. Rate controlled with BB  2. BB  3. lovenox daily    4. No AC meds due to bleeding w/ TPA   11.  Volume overload  due to acute on chronic diastolic HF -   1. cont daily lasix PO   2

## 2018-11-02 NOTE — PROGRESS NOTES
[facility]  Progress Note      Harpal Swanson Patient Status:  Inpatient    1945 MRN [id number]   [facility] 4NW-A Attending Emanuel Esparza MD   [facility] Day # 26 PCP Arianne Arenas MD       Subjective:   Resting in bed    Objective:

## 2018-11-02 NOTE — CM/SW NOTE
Interdisciplinary Rounds: 11/02/18  Admitted: 10/7/2018 LOS: 26  Disciplines in attendance: charge nurse, staff nurse, CM, [address] and discharge plan reviewed.  Plan to discharge to [facility] plan will need to be made known to STEPHIE prior to

## 2018-11-02 NOTE — OCCUPATIONAL THERAPY NOTE
OCCUPATIONAL THERAPY TREATMENT NOTE - INPATIENT     Room Number: 126/803-J  Session: 5   Number of Visits to Meet Established Goals: 7    Presenting Problem: hypotension    History related to current admission: Pt is 67year old female admitted on 10/7/201 medical history (procedures, illnesses, hospitalizations of past 6 months):  [place] hospital admission: 9/25/18-10/3/18 for above noted procedure  8/2-8/4/18: KAMRYN  2/24-5/1/18: pneumonia, respiratory distress.   10/15/2018 - chest tube placed, draining  10/ STEREOTACTIC NODULE 2 SITE BILAT      07/2010 STEREO BX. BN.   • RADIATION RIGHT     • THYROIDECTOMY  1995    Total       SUBJECTIVE  \"I just feel dizzy. I've never even been drunk in my life. \"    Patient self-stated goal is none stated    OBJECTIVE  Pre 10-15 reps each with supervision and good balance while sitting EOB. Pt tolerated sitting EOB for self-care. Total A to don and wash hair with shampoo cap. Pt required setup to wash face. Total A to comb hair, pt stating she is unable.  Pt performed bed mob Additional Goals  Patient will be supervision for [facility].  GOAL MET 11/1/18

## 2018-11-03 NOTE — PROGRESS NOTES
[facility]  Progress Note    Samra Priest Patient Status:  Inpatient    1945 MRN [id number]   [facility] 4NW-A Attending Kory Brownlee MD   [facility] Day # 32 PCP Carlitos Owens MD     Subjective:  Samra Priest is a(n) 67 year ol 1. 8*  2.0*   --    --    NA  144   < >  142  144  145*  146*   K  4.1   < >  4.2  3.9  3.9  4.2   CL  111   < >  108  110  110  109   CO2  28.0   < >  25.0  27.0  28.0  29.0   ALKPHO  98   --   116  119   --    --    AST  10*   --   10*  10*   --    -- remain unclear- to keep pigtail in place vs pleurx catheter which may be difficult to place given loculations  · Chemotherapy per oncology, remains on G-CSF with leukocytosis  · Continuing to follow carefully  · Overall prognosis remains poor  · Attempt OO

## 2018-11-03 NOTE — PROGRESS NOTES
Patient is alert,oriented. Chest tubes x2 in place and patent. Upper tube with 100 ml of fluid this shift. Lower tube with 0ml. No complaint of pain. IV antibiotic given per MD orders. Last h&h was 8. 2. Resting quietly at this time.

## 2018-11-03 NOTE — PROGRESS NOTES
[facility]  Progress Note     Chi Summers Patient Status:  Inpatient    1945 MRN [id number]   [facility] 5NW-A Attending Frankie Tucker MD   [facility] Day # 32 PCP Deedee Ordaz MD     Chief Complaint:  SOB   S:  Feels better to K  4.1   < >  4.2  3.9  3.9  4.2   CL  111   < >  108  110  110  109   CO2  28.0   < >  25.0  27.0  28.0  29.0   ALKPHO  98   --   116  119   --    --    AST  10*   --   10*  10*   --    --    ALT  7*   --   <6*  8*   --    --    BILT  0.7   --   0.8  0. 8. Hypothyroid  1. Continue levothyroxine   9. Chronic lymphedema of LE, RUE        10. Chronic Afib  1. Rate controlled with BB  2. BB  3. lovenox daily    4. No AC meds due to bleeding w/ TPA   11.  Volume overload  due to acute on chronic diastolic HF

## 2018-11-03 NOTE — IMAGING NOTE
2 right chest tubes remain in place with minimal drainage from lower tube. CXR not significantly changed. Continue management per pulmonary.

## 2018-11-03 NOTE — PROGRESS NOTES
Heme/Onc Progress Note - [facility]      Chief Complaint:    Follow up for metastatic uterine leiomyosarcoma. Interim History:      She has no new complaints this morning. She has no new pain. She had doxorubicin on 11/1/2018.  She was started on G-CSF yes 119   --    --    AST  10*   --   10*  10*   --    --    ALT  7*   --   <6*  8*   --    --    BILT  0.7   --   0.8  0.7   --    --    TP  4.2*   --   4.6*  4.8*   --    --     < > = values in this interval not displayed. Impression:     1.    Metast

## 2018-11-03 NOTE — PLAN OF CARE
Pt is alert and oriented x 4. Vitals stable. No c/o pain or discomfort.  wanted to clamp the Lower chest tube and to get out the pt from bed to chair. Pt does not want to sit in the chair as the chair is too small per pt. Sat up the pt in the b

## 2018-11-04 NOTE — PHYSICAL THERAPY NOTE
PHYSICAL THERAPY TREATMENT NOTE - INPATIENT    Room Number: 668/434-F     Session: 8  Number of Visits to Meet Established Goals: 10    Presenting Problem: Jadiel PE dx on 10/10; LLE thrombus; s/p IVC filter placed on 10/20/18; bleeding from unknown etiology BILAT      07/2010 STEREO BX. BN.   • RADIATION RIGHT     • THYROIDECTOMY  1995    Total       SUBJECTIVE  \"I don't know if it's these meds but I don't feel like myself. \"    Patient’s self-stated goal is to get stronger.     OBJECTIVE  Precautions: Chest position in bed. Educated pt on the importance of mobility/ROM while in hospital and to continue to move LE's and UE's while in bed-Ankle pumps and bicep curls encouraged. Pt agreeable.   Pt completed supine and seated (EOB) exercises listed below with mi Patient is able to participate in further mobility assessment.   Met 11/1/18      Goal #3 Pt will roll left and right with mod indep.    Ongoing 10/24/18  Met 11/1/18      Goal #4 Pt will ambulate 25' c RW and min A       Goal #5 Pt able to tolerate standi

## 2018-11-04 NOTE — PROGRESS NOTES
[facility]  Progress Note    Mahendra Garnica Patient Status:  Inpatient    1945 MRN [id number]   [facility] 4NW-A Attending Rodrigo Terrazas MD   [facility] Day # 28 PCP Jackie Camara MD     Subjective:  Mahendra Garnica is a(n) 67 year ol --    --    BILT  0.8  0.7   --    --    --    TP  4.6*  4.8*   --    --    --        No results for input(s): PTP, INR, PTT in the last 168 hours. Cultures: reviewed     Radiology:  CXR 11/3- slightly improved aeration on the right.   2 right chest t Lexie Kennedy MD, [address] [place] [address] Lung Associate

## 2018-11-04 NOTE — PROGRESS NOTES
[facility]  Progress Note      Lynda Dhaliwal Patient Status:  Inpatient    1945 MRN [id number]   [facility] 4NW-A Attending Santhosh Wei MD   [facility] Day # 28 PCP Yaw Gibbs MD       Subjective:   Resting in bed    Objective:

## 2018-11-04 NOTE — PROGRESS NOTES
[facility]  Progress Note     Susana [place] Patient Status:  Inpatient    1945 MRN [id number]   [facility] 5NW-A Attending Stuart Kaplan MD   [facility] Day # 29 PCP Arcenio Staples MD     Chief Complaint:  SOB   S:  Feels better to CL  108  110  110  109  109   CO2  25.0  27.0  28.0  29.0  33.0*   ALKPHO  116  119   --    --    --    AST  10*  10*   --    --    --    ALT  <6*  8*   --    --    --    BILT  0.8  0.7   --    --    --    TP  4.6*  4.8*   --    --    --           Imagin Afib  1. Rate controlled with BB  2. BB  3. lovenox daily    4. No AC meds due to bleeding w/ TPA   11. Volume overload  due to acute on chronic diastolic HF -   1. Daily weight   2. Weight up significantly  3.  Hypernatremia increasing, IV lasix today, res

## 2018-11-04 NOTE — PROGRESS NOTES
Heme/Onc Progress Note - [facility]      Chief Complaint:    Follow up for metastatic uterine leiomyosarcoma. Interim History:      She has no new complaints at this visit. She has no new pain. She had doxorubicin on 11/1/2018.  She was started on G-CSF on 4. 6*  4.8*   --    --    --          Impression:     1. Metastatic leiomyosarcoma:   Liver and Lung metastases  Doxorubicin given on 11/1/108  G-CSF started on 11/2/2018    Continue daily G-CSF. This is being give to prevent the heena.  Continue to give w

## 2018-11-04 NOTE — PLAN OF CARE
Pt is alert and oriented x 4. Vitals stable. No C/o pain or discomfort. PT gets Short of breath more on exertion. Remains on O2 2L nasal cannula with O2 sats 95%. Pt at one time desats in 85 % but picked up right away. Lower Chest tube remained clamped.  Rt

## 2018-11-04 NOTE — PLAN OF CARE
Achieve highest/safest level of mobility/gait Not Progressing      Incision(s), wounds(s) or drain site(s) healing without S/S of infection Not Progressing      Maintains hematologic stability Progressing      Free from bleeding injury Progressing      Mariella Curet

## 2018-11-05 NOTE — PROGRESS NOTES
[facility] Progress Note      Patient Name: Nhung Stanley   YOB: 1945  Medical Record Number: [id number]  Attending Physician: Kajal Goodwin M.D.       SUBJECTIVE:  Chi Summers is a(n) 67year old female with n 100.0 fL    MCH 27.5 27.0 - 33.2 pg    MCHC 29.2 (L) 31.0 - 37.0 g/dL    RDW 18.2 (H) 11.5 - 16.0 %    RDW-SD 63.2 (H) 35.1 - 46.3 fL    Neutrophil Absolute Prelim 24.86 (H) 1.30 - 6.70 x10 (3) uL   MANUAL DIFFERENTIAL    Collection Time: 11/05/18  5:36 AM injection 20 mg 20 mg Intravenous Once   [COMPLETED] predniSONE (DELTASONE) tab 50 mg 50 mg Oral Q6H   [COMPLETED] DiphenhydrAMINE HCl (BENADRYL) cap 50 mg 50 mg Oral Once   [COMPLETED] 0.9%  NaCl infusion  Intravenous Once   Ampicillin-Sulbactam Sodium (U (MIST2) syringe 30 mL 5 mg Intrapleural Q12H   [COMPLETED] alteplase (ACTIVASE) 10 mg in sodium chloride 0.9% (MIST2) syringe 30 mL 10 mg Intrapleural Once   [COMPLETED] dornase liam (PULMOZYME) 5 mg in Sterile Water for Injection (MIST2) syringe 30 mL 5 m [COMPLETED] magnesium oxide (MAG-OX) tab 400 mg 400 mg Oral Once   [COMPLETED] iohexol (OMNIPAQUE) 350 MG/ML injection 100 mL 100 mL Intravenous ONCE PRN   [COMPLETED] Potassium Chloride ER (K-DUR M20) CR tab 40 mEq 40 mEq Oral Once   [COMPLETED] magnesi 0.9% 500 mL IVPB 25 mg/kg (Adjusted) Intravenous Once   [COMPLETED] Potassium Chloride ER (K-DUR M20) CR tab 40 mEq 40 mEq Oral Q4H   [COMPLETED] morphINE sulfate (PF) 4 MG/ML injection 4 mg 4 mg Intravenous Once   [COMPLETED] Piperacillin Sod-Tazobactam S

## 2018-11-05 NOTE — PROGRESS NOTES
[facility]  Progress Note     Mahendra Garnica Patient Status:  Inpatient    1945 MRN [id number]   [facility] 5NW-A Attending Rodrigo Terrazas MD   [facility] Day # 34 PCP Jackie Camara MD     Chief Complaint:  SOB   S:   Was up to [facility] --    --    --    NA  142  144   < >  146*  148*  145*   K  4.2  3.9   < >  4.2  4.2  4.2   CL  108  110   < >  109  109  107   CO2  25.0  27.0   < >  29.0  33.0*  31.0   ALKPHO  116  119   --    --    --    --    AST  10*  10*   --    --    --    --    A BB  2. BB  3. lovenox daily    4. No AC meds due to bleeding w/ TPA   11. Volume overload  due to acute on chronic diastolic HF -   1. Daily weight   2. Bid lasix  Wt remains elevated    12. Deconditioned / depression-   15. Morbid obesity  1.  BMI 44   OCTAVIA

## 2018-11-05 NOTE — PROGRESS NOTES
Alert,oriented,Patient has no complaint of pain. Dressing to old chest tube is C/D/I. Right chest tube is patent. Using [email or web address] Lper N/C. Dressing to abdominal incision is C/D/I. IV antibiotics given per MD orders. Resting quietly at this time. Order for Wound Car

## 2018-11-05 NOTE — OCCUPATIONAL THERAPY NOTE
OCCUPATIONAL THERAPY TREATMENT NOTE - INPATIENT     Room Number: 592/409-D  Session: 6   Number of Visits to Meet Established Goals: 7    Presenting Problem: hypotension    History related to current admission: Pt is 67year old female admitted on 10/7/201 medical history (procedures, illnesses, hospitalizations of past 6 months):  [place] hospital admission: 9/25/18-10/3/18 for above noted procedure  8/2-8/4/18: KAMRYN  2/24-5/1/18: pneumonia, respiratory distress.   10/15/2018 - chest tube placed, draining  10/ STEREOTACTIC NODULE 2 SITE BILAT      07/2010 STEREO BX. BN.   • RADIATION RIGHT     • THYROIDECTOMY  1995    Total       SUBJECTIVE  \"I think it's because of my eyes\" that she feels dizzy    Patient self-stated goal is none stated    OBJECTIVE  [id number] [facility] [address]    ASSESSMENT   Pt is progressing towards OT goals.  Pt presents with deficits in endurance, pacing, use of adaptive techniques, balance, standing tolerance, cardiopulmonary activity tolerance impacting safety and independence in ADL/functional transf

## 2018-11-05 NOTE — PLAN OF CARE
RESPIRATORY - ADULT    • Achieves optimal ventilation and oxygenation Not Progressing          RESPIRATORY - ADULT    • Achieves optimal ventilation and oxygenation Not Progressing          HEMATOLOGIC - ADULT    • Free from bleeding injury Progressing

## 2018-11-05 NOTE — PHYSICAL THERAPY NOTE
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   D/w pt and PCT may require use of mara to return to bed if continues to fatigue while upright.       THERAPEUTIC EXERCISES  Lower Extremity Ankle pumps  Hip adduction squeezes  Knee extension     Upper Extremity See OT note     Position Sitting

## 2018-11-05 NOTE — PROGRESS NOTES
[address] [facility]  TEL: [phone]  FAX: [phone]    Manisha Miguel Patient Status:  Inpatient    1945 MRN [id number]   [facility] 4NW-A Attending Anjelica Waggoner MD   [address] [facility] Day # 34 PCP Rivka Fleming, 0. 71  0.71  0.84   < >  0.90  0.92  0.91   GFRAA  98  98  80   < >  74  72  73   GFRNAA  85  85  70   < >  64  62  63   CA  7.1*  7.5*   < >  7.3*  7.1*  7.0*   ALB  1.8*  2.0*   --    --    --    --    NA  142  144   < >  146*  148*  145*   K  4.2  3.9 candidate for decortication, so decision was to proceed with chemo  -cont unasayn. follow counts and temps     3. Leukocytosis- due to filgrastim. She is afebrile and otherwise stable. Counts expected to drop later this week.  Cont to monitor    Case and pl

## 2018-11-05 NOTE — DIETARY NOTE
NUTRITION FOLLOW UP ASSESSMENT     Pt is at LOW nutrition risk.  Pt does not meet malnutrition criteria.     NUTRITION DIAGNOSIS/PROBLEM:  Increased nutrient needs related to increased demands of Catabolic process as evidenced by recent exlap and  surgical RD to continue to encourage PO and monitor intake. 10/26- Calorie count initiated yesterday. Pt was on clear liquid diet until late afternoon so only dinner ticket was saved. See Igor count note for details.  Endorses good appetite this morning, eating 75-1 oz)  06/12/18 : 128.8 kg (284 lb)     NUTRITION:  Diet: General diet  Oral Supplements: None     FOOD/NUTRITION RELATED HISTORY:  Appetite: Good  Intake: %  Intake Meeting Needs: Marginal  Food Allergies: No  Cultural/Ethnic/Baptist Preferences Add

## 2018-11-05 NOTE — PROGRESS NOTES
[facility]  Progress Note    Trina Fairchild Patient Status:  Inpatient    1945 MRN [id number]   [facility] 4NW-A Attending Christine Hollingsworth MD   [facility] Day # 34 PCP Maya Lord MD     Assessment:     1. Bilateral pulmonary embol limb     Cervicalgia     Screening for malignant neoplasm of the cervix     Osteoarthritis     Type II diabetes mellitus, uncontrolled ([address]     Essential hypertension     Obesity, unspecified     Malignant neoplasm of thyroid gland ([address]     Encounter for Objective:  Oxygen Therapy  SpO2: 93 %  O2 Device: Nasal cannula  O2 Flow Rate (L/min): 2 L/min  Pulse Oximetry Type: Continuous  Oximetry Probe Site Changed: Yes  Pulse Ox Probe Location: Right hand  Blood pressure 105/64, pulse 111, temperature 97.7 °F --    --    AST  10*  10*   --    --    --    --    ALT  <6*  8*   --    --    --    --    BILT  0.8  0.7   --    --    --    --    TP  4.6*  4.8*   --    --    --    --      @MG@    Lab Results   Component Value Date    INR 1.35 (H) 10/25/2018    INR 1.28 mg 10 mg Intravenous Q8H PRN   PEG 3350 (MIRALAX) powder packet 17 g 17 g Oral Daily PRN   magnesium hydroxide (MILK OF MAGNESIA) 400 MG/5ML suspension 30 mL 30 mL Oral Daily PRN   bisacodyl (DULCOLAX) rectal suppository 10 mg 10 mg Rectal Daily PRN   FLEE

## 2018-11-05 NOTE — CM/SW NOTE
LOS # 29 days. Chest Tube x 1 in place to suction- oxygen 2 L per n/c. PT/OT continue to work with patient. Discharge plan is [facility] when medically cleared.     Evelin Timmons, 11/05/18, 11:44 AM

## 2018-11-05 NOTE — CONSULTS
[facility]  Report of Inpatient Wound Care Consultation    Cleo Lee Patient Status:  Inpatient    1945 MRN [id number]   [facility] 4NW-A Attending Tonya Miranda MD   [address] [facility] Day # 34 PCP Ami Avila MD     [facility] 7. 1*  7.0*   < >  8.2*  8.3*  7.6*   HCT  23.6*  23.2*   < >  28.2*  27.9*  26.0*   PLT  192.0  259.0   < >  309.0  231.0  189.0   CREATSERUM  0.71  0.71  0.84   < >  0.90  0.92  0.91   BUN  24*  30*   < >  35*  43*  46*   GLU  157*  139*   < >  129*  113*

## 2018-11-06 NOTE — PROGRESS NOTES
[address] [facility]  TEL: [phone]  FAX: [phone]    Kolton Getting Patient Status:  Inpatient    1945 MRN [id number]   [facility] [place] 4NW-A Attending Burke Waggoner MD   [facility] Day # 27 PCP Kain Castañeda, 11/04/18   0544  11/05/18   0536   GLU  157*  139*   < >  129*  113*  126*   BUN  24*  30*   < >  35*  43*  46*   CREATSERUM  0.71  0.71  0.84   < >  0.90  0.92  0.91   GFRAA  98  98  80   < >  74  72  73   GFRNAA  85  85  70   < >  64  62  63   CA  7.1* tube may not help at this point since still with a mod effusion but not able to drain through CT present there already. Not a candidate for decortication, so decision was to proceed with chemo  -cont unasyn. follow counts and temps. cxr seems stable    3.

## 2018-11-06 NOTE — PROGRESS NOTES
Alert,oriente,No complaint of pain this shift. Abdominal dressing is clean,dry, and intact. Resting comfortably.

## 2018-11-06 NOTE — PROGRESS NOTES
[facility]  Progress Note    Jody Houser Patient Status:  Inpatient    1945 MRN [id number]   [facility] 4NW-A Attending Edd Barber MD   [address] Day # 27 PCP Susannah Elmore MD     Assessment:     1. Bilateral pulmonary embol shoulder region     Venous (peripheral) insufficiency     Swelling of limb     Cervicalgia     Screening for malignant neoplasm of the cervix     Osteoarthritis     Type II diabetes mellitus, uncontrolled ([address]     Essential hypertension     Obesity, unspe year old female.    no dyuspnea at rest   Objective:  Oxygen Therapy  SpO2: (!) 77 %  O2 Device: Nasal cannula  O2 Flow Rate (L/min): 3 L/min  Pulse Oximetry Type: Continuous  Oximetry Probe Site Changed: Yes  Pulse Ox Probe Location: Right hand  Blood pres --    --    --    AST  10*  10*   --    --    --    --    ALT  <6*  8*   --    --    --    --    BILT  0.8  0.7   --    --    --    --    TP  4.6*  4.8*   --    --    --    --      @MG@    Lab Results   Component Value Date    INR 1.35 (H) 10/25/2018    IN injection 10 mg 10 mg Intravenous Q8H PRN   PEG 3350 (MIRALAX) powder packet 17 g 17 g Oral Daily PRN   magnesium hydroxide (MILK OF MAGNESIA) 400 MG/5ML suspension 30 mL 30 mL Oral Daily PRN   bisacodyl (DULCOLAX) rectal suppository 10 mg 10 mg Rectal Shaniqua Narayanan

## 2018-11-06 NOTE — PLAN OF CARE
Wound care evaluated abdominal incision as well as hospitalist, and both stated abdominal wound hasnt changed since last evaluation, no draiange noted.

## 2018-11-06 NOTE — PROGRESS NOTES
[facility] Hematology Oncology Group Progress Note      Patient Name: Yadira Sousa   YOB: 1945  Medical Record Number: [id number]      SUBJECTIVE:  Ashley Chavira is a(n) 67year old female with newly diagnosed metastatic leiomyosarcoma of th 0.00 - 0.10 x10(3) uL    Neutrophils % Manual 92 %    Band % 4 %    Lymphocyte % Manual 1 %    Monocyte % Manual 0 %    Eosinophil % Manual 3 %    Basophil % Manual 0 %    Total Cells Counted 100     RBC Morphology See morphology below (A) Normal    Platel Intravenous Q8H   [COMPLETED] magnesium oxide (MAG-OX) tab 400 mg 400 mg Oral Once   metoprolol Tartrate (LOPRESSOR) tab 25 mg 25 mg Oral 2x Daily(Beta Blocker)   [COMPLETED] sodium chloride 0.9% IV bolus 250 mL 250 mL Intravenous Once   [] Midazola Once   [COMPLETED] Magnesium Sulfate IVPB premix SOLN 2 g 2 g Intravenous Once   Loperamide HCl (IMODIUM) cap 2 mg 2 mg Oral QID PRN   [COMPLETED] sodium chloride 0.9% IV bolus 1,000 mL 1,000 mL Intravenous Once   [COMPLETED] magnesium oxide (MAG-OX) tab 4 Lidocaine HCl (XYLOCAINE) 2 % injection 20 mL 20 mL Infiltration Once   [COMPLETED] Potassium Chloride ER (K-DUR M20) CR tab 40 mEq 40 mEq Oral Once   [COMPLETED] magnesium oxide (MAG-OX) tab 400 mg 400 mg Oral Once   [COMPLETED] predniSONE (DELTASONE) tab Intravenous Once   atorvastatin (LIPITOR) tab 10 mg 10 mg Oral Nightly   Levothyroxine Sodium (SYNTHROID, LEVOTHROID) tab 225 mcg 225 mcg Oral QAM AC   acetaminophen (TYLENOL) tab 650 mg 650 mg Oral Q4H PRN   Or      HYDROcodone-acetaminophen (NORCO) 5-325

## 2018-11-06 NOTE — PLAN OF CARE
Patient received Albumin as ordered, BP 98/60 heart rate 100, checked with Dr Jennifer Rodriguez and okay to give lasix 40mg iv

## 2018-11-06 NOTE — PROGRESS NOTES
[facility]  Progress Note     Lynn Raymundo Patient Status:  Inpatient    1945 MRN [id number]   [facility] 5NW-A Attending Kary Cisneros MD   [address] Day # 27 PCP Romel Garcia MD     Chief Complaint:  SOB   S:    I feel so fat 7. 3*  7.1*  7.0*   ALB  1.8*  2.0*   --    --    --    --    NA  142  144   < >  146*  148*  145*   K  4.2  3.9   < >  4.2  4.2  4.2   CL  108  110   < >  109  109  107   CO2  25.0  27.0   < >  29.0  33.0*  31.0   ALKPHO  116  119   --    --    --    -- lymphedema of LE, RUE        10. Chronic Afib  1. Rate controlled with BB  2. BB  3. lovenox daily    4. No AC meds due to bleeding w/ TPA   11. Volume overload  due to acute on chronic diastolic HF -   1.  Daily weight   2.   wts increasing  Not responding

## 2018-11-06 NOTE — PLAN OF CARE
Patient resting in bed, o2 on at 2 liters per nasal cannula, with cpox at 100%, telemetry a-fib rate 112

## 2018-11-06 NOTE — PROGRESS NOTES
Name:Zenaida Chan  [facility]#:[id number]  :1945      Subjective:  Lying in bed. Stable shortness of breath. Objective:  Right chest tube site is clean, dry and intact. CXR without significant change.       Vital Signs:  Blood pressure 113/41, puls

## 2018-11-06 NOTE — CM/SW NOTE
Referral sent to [facility] for review.     Shaka Sanchez, 11/06/18, 10:11 AM

## 2018-11-07 NOTE — CM/SW NOTE
[facility] here reviewing case. [facility] may be able to administer chemotherapy at facility. Will discuss discharge plan when RML lets me know if they can provide services needed. The [place] informed me, Mrs Leonid Burns is too complicated for facility.   June Clancy o

## 2018-11-07 NOTE — PLAN OF CARE
Patient now resting in bed,asleep. C/o fatigued,patient states \"I feel the same today,nothing out of the ordinary. \"Was up in the chair for a good hour. SOB on exertion noted,O2 sats on the low 90s to mid 90's. Generalized edema noted radha on R arm. Arlen Riddles

## 2018-11-07 NOTE — PROGRESS NOTES
[facility]  Progress Note     Lynda Barney Patient Status:  Inpatient    1945 MRN [id number]   [facility] 5NW-A Attending [address] Day # 32 PCP Yaw Gibbs MD     Chief Complaint:  SOB   S:    Still feels bloat GFRAA  80   < >  72  73  60   GFRNAA  70   < >  62  63  52*   CA  7.5*   < >  7.1*  7.0*  6.7*   ALB  2.0*   --    --    --   2.3*   NA  144   < >  148*  145*  145*   K  3.9   < >  4.2  4.2  4.1   CL  110   < >  109  107  105   CO2  27.0   < >  33.0*  31 transfuse today    8. Hypothyroid  1. Continue levothyroxine   9. Chronic lymphedema of LE, RUE        10. Chronic Afib  1. Rate controlled with BB  2. BB held this am   3. lovenox daily    4. No AC meds due to bleeding w/ TPA   11.  Anasacra -- massive   M

## 2018-11-07 NOTE — CONSULTS
[facility]  Report of Consultation    Valrie Felty Patient Status:  Inpatient    1945 MRN [id number]   [facility] 4NW-A Attending Hugo Vaughn MD   [facility] Day # 27 PCP Chikis Madrid MD       Assessment / Plan:    1) Kevin dill • MIMI BIOPSY STEREOTACTIC NODULE 2 SITE BILAT      07/2010 STEREO BX. BN.   • RADIATION RIGHT     • THYROIDECTOMY  1995    Total     Family History   Problem Relation Age of Onset   • Breast Cancer Self    • Arthritis Father    • Other (Other) Father 650 mg, 650 mg, Oral, Q4H PRN **OR** HYDROcodone-acetaminophen (NORCO) 5-325 MG per tab 1 tablet, 1 tablet, Oral, Q4H PRN **OR** HYDROcodone-acetaminophen (NORCO) 5-325 MG per tab 2 tablet, 2 tablet, Oral, Q4H PRN  •  ondansetron HCl (ZOFRAN) injection 4 m 23.8 11/06/2018    .0 11/06/2018       Imaging: All imaging studies reviewed. Thank you for allowing me to participate in the care of your patient.     Darvin Horne  11/6/2018  8:34 PM

## 2018-11-07 NOTE — PROGRESS NOTES
[facility]  Nephrology Progress Note    Tamea Sick Attending:  Kailey Ramon MD       Assessment and Plan:    1) Anasarca- multifactorial; primarily due to metastatic CA with hypoalbuminemia and marked 3rd spacing now less responsive to diure 11/07/2018    HCT 22.8 11/07/2018    .0 11/07/2018    CREATSERUM 1.07 11/07/2018    BUN 49 11/07/2018     11/07/2018    K 4.1 11/07/2018     11/07/2018    CO2 33.0 11/07/2018     11/07/2018    CA 6.7 11/07/2018    ALB 2.3 11/07/20 Intravenous Q6H PRN   Metoclopramide HCl (REGLAN) injection 10 mg 10 mg Intravenous Q8H PRN   PEG 3350 (MIRALAX) powder packet 17 g 17 g Oral Daily PRN   magnesium hydroxide (MILK OF MAGNESIA) 400 MG/5ML suspension 30 mL 30 mL Oral Daily PRN   bisacodyl (D

## 2018-11-07 NOTE — PROGRESS NOTES
[facility]  Progress Note    Susana Nicholson Patient Status:  Inpatient    1945 MRN [id number]   [facility] 4NW-A Attending Jameel Joyner MD   [facility] Day # 32 PCP Arcenio Staples MD     Assessment:     1. Bilateral pulmonary em tendons in shoulder region     Venous (peripheral) insufficiency     Swelling of limb     Cervicalgia     Screening for malignant neoplasm of the cervix     Osteoarthritis     Type II diabetes mellitus, uncontrolled ([address]     Essential hypertension     Obe is a(n) 67year old female.    no new complains   Very week   resp looks labored , but she states breathing is comfortable    Objective:  Oxygen Therapy  SpO2: 98 %  O2 Device: Nasal cannula  O2 Flow Rate (L/min): 4 L/min  Pulse Oximetry Type: Continuous  O 110  110  109  109  107  105   CO2  27.0  28.0  29.0  33.0*  31.0  33.0*   ALKPHO  119   --    --    --    --   128   AST  10*   --    --    --    --   9*   ALT  8*   --    --    --    --   8*   BILT  0.7   --    --    --    --   0.9   TP  4.8*   --    -- HYDROcodone-acetaminophen (NORCO) 5-325 MG per tab 1 tablet 1 tablet Oral Q4H PRN   Or      HYDROcodone-acetaminophen (NORCO) 5-325 MG per tab 2 tablet 2 tablet Oral Q4H PRN   ondansetron HCl (ZOFRAN) injection 4 mg 4 mg Intravenous Q6H PRN   Metoclopram

## 2018-11-07 NOTE — PROGRESS NOTES
[address] [place] [address]  TEL: [phone]  FAX: [phone]    Alejandro Chisholm Patient Status:  Inpatient    1945 MRN [id number]   [facility] 4NW-A Attending Celeste Bang MD   [facility] Day # 32 PCP Ángel Centeno, --    --    --     < > = values in this interval not displayed.      Recent Labs   Lab  11/01/18   0614   11/04/18   0544  11/05/18   0536  11/07/18   0543   GLU  139*   < >  113*  126*  140*   BUN  30*   < >  43*  46*  49*   CREATSERUM  0.84   < >  0.92 worsening metastatic disease. -D/w pulm earlier, pleurex tube may not help at this point since still with a mod effusion but not able to drain through CT present there already.  Not a candidate for decortication, so decision was to proceed with chemo  -co

## 2018-11-07 NOTE — PROGRESS NOTES
Name:Zenaida Amaro  QXN#:[id number]  :1945      Subjective:  Asllep in bed. No changes or complaint voiced. Objective:  Right chest tube site is clean, dry and intact.     Vital Signs:  Blood pressure 99/71, pulse (!) 124, temperature 97.8 °F

## 2018-11-07 NOTE — PHYSICAL THERAPY NOTE
Pt attempted for physical therapy this afternoon, but adamantly declining at this time despite attempts to motivate/encourage activity participation.

## 2018-11-08 NOTE — PROGRESS NOTES
[facility]  Progress Note    Je More Patient Status:  Inpatient    1945 MRN [id number]   [facility] 4NW-A Attending Jerel Singh MD   [facility] Day # 28 PCP Barak Armijo MD     Subjective:  Je More is a(n) 72 year tube upper remains in place  Gradual increase in left lower lobe effusion/atelectasis/masses      Medications reviewed     Assessment and Plan:   Patient Active Problem List:     Disorder of bursae and tendons in shoulder region     Venous (peripheral) ins blood loss anemia     Goals of care, counseling/discussion     Palliative care encounter     Anemia in neoplastic disease     Hypervolemia     Hypernatremia     Bandemia     B12 deficiency    Assessment:     1. Bilateral pulmonary emboli-suspected pelvic o

## 2018-11-08 NOTE — IMAGING NOTE
24 hr right chest tube output is was 16 cc, however, 106 cc has been recorded so far today. Most recent CXR 11/7, unchanged. COntinued management per pulmonary.

## 2018-11-08 NOTE — PROGRESS NOTES
1 unit prbc transfused for hgb of 6.5 this am. Repeat hgb post transfusion 6.8. Message sent to Dr Faye Horowitz.

## 2018-11-08 NOTE — PROGRESS NOTES
[facility] Pharmacy Note:  Renal Adjustment for ampicillin/sulbactam (Maulik Higgins)    Susana Nicholson is a 67year old female who has been prescribed ampicillin/sulbactam (UNASYN) 3 g every 8 hrs.   CrCl is estimated creatinine clearance is 39.8 mL/min (A) (based on SC

## 2018-11-08 NOTE — PROGRESS NOTES
[facility] Oncology [facility] Progress Note      Patient Name: Julieta Craig   YOB: 1945  Medical Record Number: [id number]  Attending Physician: Radha Avila.  Mike Velazquez M.D.       SUBJECTIVE:  Angela Umana is a(n) 67year old female with n Manual 11.83 (H) 1.30 - 6.70 x10(3) uL    Lymphocyte Absolute Manual 1.22 0.90 - 4.00 x10(3) uL    Monocyte Absolute Manual 0.14 0.10 - 1.00 x10(3) uL    Eosinophil Absolute Manual 0.27 0.00 - 0.30 x10(3) uL    Basophil Absolute Manual 0.00 0.00 - 0.10 x10 Phosphorus 4.5 2.5 - 4.9 mg/dL   CBC W/ DIFFERENTIAL    Collection Time: 11/07/18  5:43 AM   Result Value Ref Range    WBC 13.0 4.0 - 13.0 x10(3) uL    RBC 2.50 (L) 3.80 - 5.10 x10(6)uL    HGB 6.9 (LL) 12.0 - 16.0 g/dL    HCT 22.8 (L) 34.0 - 50.0 %    PLT NaCl infusion  Intravenous Once   [COMPLETED] acetaminophen (TYLENOL) tab 650 mg 650 mg Oral Once   filgrastim-sndz (ZARXIO) 480 MCG/0.8ML injection SOSY 480 mcg 480 mcg Subcutaneous Evelin@[employer] Compl[employer]   [COMPLETED] furosemide (LASIX) injection 40 mg 40 mg Intrave mg in Sterile Water for Injection 2.2 mL IV push 2 mg Intravenous Once   [COMPLETED] magnesium oxide (MAG-OX) tab 400 mg 400 mg Oral Once   Vitamins A & D 30 g, Alum & Mag Hydroxide-Simeth (MAALOX) 30 mL, Zinc Oxide (DESITIN) 40 % 30 g  Magic butt cream  T injection      [COMPLETED] magnesium oxide (MAG-OX) tab 800 mg 800 mg Oral Once   morphINE sulfate (PF) 4 MG/ML injection 1 mg 1 mg Intravenous Q12H PRN   [COMPLETED] sodium chloride 0.9% IV bolus 500 mL 500 mL Intravenous Once   [COMPLETED] 0.9%  NaCl inf (MAG-OX) tab 400 mg 400 mg Oral Once   [COMPLETED] furosemide (LASIX) injection 40 mg 40 mg Intravenous Once   Acidophilus/Pectin (PROBIOTIC) CAPS 1 capsule 1 capsule Oral Daily   [COMPLETED] iohexol (OMNIPAQUE) 350 MG/ML injection 100 mL 100 mL Intravenou suspension 30 mL 30 mL Oral Daily PRN   bisacodyl (DULCOLAX) rectal suppository 10 mg 10 mg Rectal Daily PRN   FLEET ENEMA (FLEET) 7-19 GM/118ML enema 133 mL 1 enema Rectal Once PRN   [COMPLETED] furosemide (LASIX) injection 40 mg 40 mg Intravenous Once

## 2018-11-08 NOTE — PROGRESS NOTES
[facility]  Nephrology Progress Note    Tamea Sick Attending:  Kailey Ramon MD       Assessment and Plan:    1) Anasarca- multifactorial; primarily due to metastatic CA with hypoalbuminemia and marked 3rd spacing now less responsive to diure CREATSERUM 1.15 11/08/2018    BUN 53 11/08/2018     11/08/2018    K 3.6 11/08/2018     11/08/2018    CO2 33.0 11/08/2018     11/08/2018    CA 6.7 11/08/2018       Imaging: All imaging studies reviewed.     Meds:     Current Facility-Admi PRN   bisacodyl (DULCOLAX) rectal suppository 10 mg 10 mg Rectal Daily PRN   FLEET ENEMA (FLEET) 7-19 GM/118ML enema 133 mL 1 enema Rectal Once PRN         Questions/concerns were discussed with patient and/or family by bedside.           Darvin Horne  11/

## 2018-11-08 NOTE — PROGRESS NOTES
[address] [facility]  TEL: [phone]  FAX: [phone]    Williams Montanojose guadalupe Patient Status:  Inpatient    1945 MRN [id number]   [facility] 4NW-A Attending Theo Freeman MD   [facility] Day # 28 PCP Swapnil Qureshi, values in this interval not displayed.      Recent Labs   Lab  11/05/18   0536  11/07/18   0543  11/08/18   0534   GLU  126*  140*  127*   BUN  46*  49*  53*   CREATSERUM  0.91  1.07*  1.15*   GFRAA  73  60  55*   GFRNAA  63  52*  48*   CA  7.0*  6.7*  6.7* for decortication, so decision was to proceed with chemo, given last week  -cont unasyn to treat clostridium in pleural fluid, duration of treatment to be determined, may still have a pocket of infected fluid, we have not been able to drain effusion well

## 2018-11-08 NOTE — PROGRESS NOTES
[facility]  Progress Note     Lynn Raymundo Patient Status:  Inpatient    1945 MRN [id number]   [facility] 5NW-A Attending [address] [facility] PCP Romel Garcia MD     Chief Complaint:  SOB   S:    C/o feeling weak --   8*   --    BILT   --   0.9   --    TP   --   4.6*   --           Imaging: Imaging data reviewed in Epic.     Medications:   • Chlorothiazide (DIURIL) IV Push  250 mg Intravenous Q12H   • ampicillin-sulbactam  3 g Intravenous Q12H   • Albumin Human  25 placed for accurate output   Will be dc'ed  Causing pain to pt wants it out, place female condom please   12. Depression    13. Morbid obesity  1.  BMI 44   PLAN  As outlined above- started diuril today - not massive response to drip,   Repeat hgb after blo

## 2018-11-09 NOTE — PROGRESS NOTES
[facility]  Nephrology Progress Note    Jennifer Tao Attending:  Chani Barnhart MD       Assessment and Plan:    1) Anasarca- multifactorial; primarily due to metastatic CA with hypoalbuminemia and marked 3rd spacing now less responsive to diure WBC 2.0 11/09/2018    HGB 6.2 11/09/2018    HCT 19.1 11/09/2018    PLT 57.0 11/09/2018    CREATSERUM 1.31 11/09/2018    BUN 53 11/09/2018     11/09/2018    K 3.8 11/09/2018     11/09/2018    CO2 32.0 11/09/2018     11/09/2018    CA 6.9 1 5-325 MG per tab 1 tablet 1 tablet Oral Q4H PRN   Or      HYDROcodone-acetaminophen (NORCO) 5-325 MG per tab 2 tablet 2 tablet Oral Q4H PRN   ondansetron HCl (ZOFRAN) injection 4 mg 4 mg Intravenous Q6H PRN   Metoclopramide HCl (REGLAN) injection 10 mg 10

## 2018-11-09 NOTE — OCCUPATIONAL THERAPY NOTE
IP OT attempted to see pt for PT/OT co-treat, however pt's Hgb level= 6.2. Holding therapy per dept protocal. Per RN, pt's HR has been high and she is going to be receiving blood this AM. Will re-attempt as schedule permits.

## 2018-11-09 NOTE — PROGRESS NOTES
11/09/18 1929   Clinical Encounter Type   Visited With Patient; Health care provider   Routine Visit Introduction   Continue Visiting No  (upon request or as needed)   Crisis Visit Patient actively dying   Patient Spiritual Encounters   Spiritual Nia Mahmood

## 2018-11-09 NOTE — PROGRESS NOTES
[facility]  Progress Note    Maurilio Fuentes Patient Status:  Inpatient    1945 MRN [id number]   [facility] 4NW-A Attending Avelino Abraham MD   [facility] Day # 35 PCP Noam Roberson MD     Subjective:  Maurilio Fuentes is a(n) 67 year ol hours.    Cultures: reviewed     Radiology:  CXR 11/9- Stable large right effusions/opacifications, partially visualized lung masses. Mild effusion on left. Mildly worse on right since lower chest tube removed 11/4.       Medications reviewed     Assessme gtt  · Chemotherapy per oncology, unlikely to be able to tolerate further treatment, however, currently in the heena of her first cycle, 1 week post  · Continuing to follow carefully  · Overall prognosis remains poor      Discussed with Dr. Derick Eli and [facility]

## 2018-11-09 NOTE — PHYSICAL THERAPY NOTE
Pt attempted for PT this morning. RN consulted, stating pt has elevated HR and low Hgb, initiating  transfusion now. Will follow up later as appropriate.

## 2018-11-09 NOTE — PROGRESS NOTES
[facility]  Progress Note     Frankey Sofia Patient Status:  Inpatient    1945 MRN [id number]   [facility] 5NW-A Attending [address] [facility] PCP Debra Zarate MD     Chief Complaint:  SOB   S:   Asked Dr Joe Shaw 0655   GLU  140*  127*   --   132*   BUN  49*  53*   --   53*   CREATSERUM  1.07*  1.15*   --   1.31*   GFRAA  60  55*   --   47*   GFRNAA  52*  48*   --   41*   CA  6.7*  6.7*   --   6.9*   ALB  2.3*   --    --   2.7*   NA  145*  146*   --   143   K  4.1 levothyroxine   9. Chronic lymphedema of LE, RUE        10. Chronic Afib  11. Anasarca -- massive   Malignant ascites   12. Depression    13. Morbid obesity  1.  BMI 44   PLAN  emotional support to pt- started MSo4 gtt, ativan as needed  inpt hospice forms

## 2018-11-09 NOTE — PROGRESS NOTES
INFECTIOUS DISEASE PROGRESS NOTE    Shar Ray Patient Status:  Inpatient    1945 MRN [id number]   [facility] 4NW-A Attending Shabbir Payne MD   [facility] Day # 35 PCP Ashley Cunningham MD     Subjective: no acute events o/n.   Afebrile, t 11/09/18   0655   ALT  8*  7*   AST  9*  8*     Problem list reviewed:  Patient Active Problem List:     Disorder of bursae and tendons in shoulder region     Venous (peripheral) insufficiency     Swelling of limb     Cervicalgia     Screening for malignan care encounter     Anemia in neoplastic disease     Hypervolemia     Hypernatremia     Bandemia     B12 deficiency    ASSESSMENT/PLAN:  1. 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

## 2018-11-09 NOTE — PROGRESS NOTES
[facility]ematology Oncology Group Progress Note      Patient Name: Maggi Stevenson   YOB: 1945  Medical Record Number: [id number]  Attending Physician: Yuly Plummer.  Elin Joel M.D.       SUBJECTIVE:  Clifton Ennis is a(n) 67year old female with n - 1.02 mg/dL    BUN/CREA Ratio 46.1 (H) 10.0 - 20.0    Calcium, Total 6.7 (L) 8.3 - 10.3 mg/dL    Calculated Osmolality 318 (H) 275 - 295 mOsm/kg    GFR, Non- 48 (L) >=60    GFR, -American 55 (L) >=60   CBC W/ DIFFERENTIAL    Collect [id number]     UNIT ABO O     UNIT RH NEG     Product Status Issued     Expiration Date [id number]     Blood Type Barcode 9500    POTASSIUM    Collection Time: 11/08/18  3:20 PM   Result Value Ref Range    Potassium 3.8 3.6 - 5.1 mmol/L   HEMOGLOBIN sodium chloride 0.9 % 111 mL IVPB  Intravenous Once   [COMPLETED] DOXOrubicin HCl. ... (ADRIAMYCIN) chemo-IV-push 138 mg 69 mL 60 mg/m2 Intravenous Once   [COMPLETED] iohexol (OMNIPAQUE) 350 MG/ML injection 100 mL 100 mL Intravenous ONCE PRN   [COMPLETED] A [COMPLETED] sodium chloride 0.9% IV bolus 500 mL 500 mL Intravenous Once   [COMPLETED] 0.9%  NaCl infusion  Intravenous Once   Enoxaparin Sodium (LOVENOX) 60 MG/0.6ML injection 60 mg 0.5 mg/kg Subcutaneous Daily   [] alteplase (ACTIVASE) 10 mg in powder  Topical BID PRN   [COMPLETED] Potassium Chloride ER (K-DUR M20) CR tab 40 mEq 40 mEq Oral Once   [COMPLETED] Potassium Chloride ER (K-DUR M20) CR tab 40 mEq 40 mEq Oral Q4H   [COMPLETED] magnesium oxide (MAG-OX) tab 800 mg 800 mg Oral Once   [COMPL predniSONE (DELTASONE) tab 50 mg 50 mg Oral Q6H   [COMPLETED] DiphenhydrAMINE HCl (BENADRYL) cap 50 mg 50 mg Oral Once   [COMPLETED] Albumin Human (ALBUMINAR) 5 % solution 500 mL 500 mL Intravenous Once   [COMPLETED] Lidocaine HCl (XYLOCAINE) 1 % injection Prophylaxis: Expect heena of WBC this weekend. Continue GCSF. 5.   Vitamin B12 deficiency: MMA elevated consistent with B12 deficiency. Start parenteral replacement therapy. Check intrinsic factor Ab. Electronically signed by:    Toni Thapa,

## 2018-11-09 NOTE — HISTORICAL OFFICE NOTE
Effie Ceron  : 1945  ACCOUNT:  [id number]  [phone]  PCP: Dr. Beatriz Philip     TODAY'S DATE: 2018  DICTATED BY:  [00]      CHIEF COMPLAINT: [pre-operative evaluation.]    HPI:    [On 2018, Tere Aguilar, a 67year old female, prese : no hematuria. INTEG: no new rashes, lesions. MS: no limiting arthritis. NEURO: no localized deficits. HEM/LYMPH: denies easy bruising. ALL: no new food or enviornmental allergies.       PAST HISTORY: arthritis, cataract, anemia, diabetes, hypothyroidism stress testing given her poor functional status. ASSESSMENT:  1. Abnormal EKG  2. Pre-operative evaluation  3. Atrial fibrillation, chronic  4. DM, Type II  5. Hyperlipidemia, mixed  6. Hypertension  7.  Obesity, morbid      PLAN:  [1.  Proceed with Eva

## 2018-11-09 NOTE — HOSPICE RN NOTE
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   She is alert and orie

## 2018-11-09 NOTE — CONSULTS
Received palliative care consultation request.  After reviewing the EMR, palliative order and speaking with the bedside RN, it appears patient is requesting hospice care. As such, palliative care will sign off this case.   Recommend placing a consult t

## 2018-11-09 NOTE — CM/SW NOTE
[facility] who states that patient meets criteria for RML but they would need chemo plan prior to admitting pt. At this time per RN the chemo plan has not been confirmed. CM will inform RML when chemo plan is confirmed.     Epi Roth, MSN

## 2018-11-09 NOTE — CONSULTS
Brief Cardiology Note:    Mrs. Eros Carvalho is a 67year old woman metastatic leiomyosarcoma. She experienced atrial fibrillation with rapid rates overnight. I was about to see her when I met with her team, who said that Mrs. Eros Carvalho transitioned her goals of c

## 2018-11-09 NOTE — PROGRESS NOTES
[address] [facility] Progress Note      Patient Name: Veronica Aguayo   YOB: 1945  Medical Record Number: [id number]    SUBJECTIVE:  Phoebe Coello is a(n) 67year old female with newly diagnosed metastatic leiomyosarcoma of the ABG pCO2 59 (H) 35 - 45 mm Hg    ABG pO2 150 (H) 80 - 105 mm Hg    ABG HCO3 32.2 (H) 22.0 - 26.0 mEq/L    ABG Base Excess 6.2     ABG O2 Saturation 96 92 - 100 %    Calculated O2 Saturation 99 92 - 100 %    Patient Temperature 98.2 F    Total Hemoglobin 16.0 g/dL    HCT 19.1 (L) 34.0 - 50.0 %    PLT 57.0 (L) 150.0 - 450.0 10(3)uL    MCV 88.0 81.0 - 100.0 fL    MCH 28.6 27.0 - 33.2 pg    MCHC 32.5 31.0 - 37.0 g/dL    RDW 16.8 (H) 11.5 - 16.0 %    RDW-SD 54.0 (H) 35.1 - 46.3 fL    Neutrophil Absolute Prelim 25 mg 25 mg Oral Once   [COMPLETED] Potassium Chloride ER (K-DUR M20) CR tab 40 mEq 40 mEq Oral Q4H   Ampicillin-Sulbactam Sodium (UNASYN) 3 g in sodium chloride 0.9 % 100 mL MBP/ADD-vantage 3 g Intravenous Q12H   Albumin Human (ALBUMINAR) 25 % solution 25 Once   [COMPLETED] predniSONE (DELTASONE) tab 50 mg 50 mg Oral Q6H   [COMPLETED] DiphenhydrAMINE HCl (BENADRYL) cap 50 mg 50 mg Oral Once   [COMPLETED] 0.9%  NaCl infusion  Intravenous Once   [COMPLETED] magnesium oxide (MAG-OX) tab 400 mg 400 mg Oral Once Once   [COMPLETED] dornase liam (PULMOZYME) 5 mg in Sterile Water for Injection (MIST2) syringe 30 mL 5 mg Intrapleural Once   [COMPLETED] 0.9%  NaCl infusion  Intravenous Once   [COMPLETED] Magnesium Sulfate IVPB premix SOLN 2 g 2 g Intravenous Once   Lop mEq Oral Once   [COMPLETED] magnesium oxide (MAG-OX) tab 400 mg 400 mg Oral Once   [COMPLETED] Lidocaine HCl (XYLOCAINE) 2 % injection 20 mL 20 mL Infiltration Once   [COMPLETED] Potassium Chloride ER (K-DUR M20) CR tab 40 mEq 40 mEq Oral Once   [COMPLETED [COMPLETED] Piperacillin Sod-Tazobactam So (ZOSYN) 4.5 g in dextrose 5 % 100 mL ADD-vantage 4.5 g Intravenous Once   atorvastatin (LIPITOR) tab 10 mg 10 mg Oral Nightly   Levothyroxine Sodium (SYNTHROID, LEVOTHROID) tab 225 mcg 225 mcg Oral QAM AC   acet consult discussed with patient. Patient friend who is POA will need to be called for palliative discussion.      Electronically signed by:    [facility]

## 2018-11-09 NOTE — CM/SW NOTE
[facility]ospice order [facility] to [facility], await a call back.     Ricardo Caceres, MSN RN, CTL/  P: [phone]

## 2018-11-10 NOTE — PROGRESS NOTES
[facility]  Progress Note     Lynda Dhaliwal Patient Status:  Inpatient    1945 MRN [id number]   [facility] 5NW-A Attending [address] [facility] Day # 1 PCP Yaw Gibbs MD     Chief Complaint:  SOB   S:   Pt somnolent, aslee ALKPHO  128   --    --   97   AST  9*   --    --   8*   ALT  8*   --    --   7*   BILT  0.9   --    --   2.1*   TP  4.6*   --    --   4.7*          Imaging: Imaging data reviewed in Epic.     Medications:   • [START ON 11/12/2018] scopolamine  1 patch Tra

## 2018-11-10 NOTE — H&P
[facility]  History and Physical     Angelina Chau Patient Status:  Inpatient    1945 MRN [id number]   [facility] 4NW-A Attending Ignacia Mueller MD   Hosp Day # 0 PCP Dulce Maria Gillette MD     Chief Complaint: Inpatient hospice Other (Other) Mother    • Other (HTN) Mother    • Asthma Maternal Grandmother        Allergies:   Radiology Contrast *    ITCHING    Comment:Itching in throat and skin  Adhesive Tape           OTHER (SEE COMMENTS)    Comment:rash    Medications:    Review --   7*   BILT  0.9   --    --   2.1*   TP  4.6*   --    --   4.7*       CrCl cannot be calculated (Unknown ideal weight.). No results for input(s): PTP, INR in the last 168 hours. No results for input(s): TROP, CK in the last 168 hours.     Imaging:

## 2018-11-10 NOTE — DISCHARGE SUMMARY
[facility] PSYCHIATRIC CENTER HOSPITALIST  DISCHARGE SUMMARY     Angela Umana Patient Status:  Inpatient    1945 MRN [id number]   [facility] 4NW-A Attending No att. providers found   Hosp Day # 35 PCP Rissa Henry MD     Date of Admission: 10/7/2018  Bruce with h/o afib, Hypothyroid, who is s/p recent resection  9/2018 of pelvic mass + leiomyosarcoma on pathology. Pt also noted to have lung mets.  Pt reports that around 3 days ago she began to notice fluid output from her wound- initially it was a watery like responded nicely to Lasix but it is her illness has progressed has not responded and is in fact continued to gain massive anasarca and edema. Was placed on Lasix drip given diarrheal and has not responded.   Patient has been receiving on both going blood t 10/8    Incidental or significant findings and recommendations (brief descriptions):   • Entering inpatient hospice with residential hospice following    Lab/Test results pending at Discharge:   · None    Consultants:  • Oncology surgery, oncology, pulmonol

## 2018-11-10 NOTE — PROGRESS NOTES
11/10/18 1316   Clinical Encounter Type   Visited With Patient and family together;Health care provider  (Pt.'s two friends at bedside)   Routine Visit (Referral from Marisol Kim)   Patient's Supportive Strategies/Resources  offered non anxio

## 2018-11-10 NOTE — HOSPICE RN NOTE
Hospice GIP DAY 2    Received pt unresponsive, on morphine gtt at 2 mg/ml, per RN pt was awake yesterday and today she is no longer responding to voice or touch. Conklin with dark dedra urine, very poor output. Oxygen at 4 L/NC.  Pedal edema 2+, slight mottli

## 2018-11-13 NOTE — DISCHARGE SUMMARY
[facility] PSYCHIATRIC CENTER HOSPITALIST  DISCHARGE SUMMARY     Ashley Gravely Patient Status:  Inpatient    1945 MRN [id number]   [facility] 4NW-A Attending No att. providers found   Hosp Day # 2 PCP Mayank Joshi MD     Date of Admission: 2018  Date old female with h/o afib, Hypothyroid, who is s/p recent resection  9/2018 of pelvic mass + leiomyosarcoma on pathology. Pt also noted to have lung mets.  Pt reports that around 3 days ago she began to notice fluid output from her wound- initially it was a initially responded nicely to Lasix but it is her illness has progressed has not responded and is in fact continued to gain massive anasarca and edema. Was placed on Lasix drip given diarrheal and has not responded.   Patient has been receiving on both goi 10/9  · Ultrasound guided paracentesis 10/8  · Ultrasound-guided right thoracentesis 10/8     Incidental or significant findings and recommendations (brief descriptions):   · Entering inpatient hospice with residential hospice following     Lab/Test results

## 2018-11-14 NOTE — DISCHARGE SUMMARY
[facility] PSYCHIATRIC CENTER HOSPITALIST  DISCHARGE SUMMARY     Melissa Ny Patient Status:  Inpatient    1945 MRN [id number]   [facility] 4NW-A Attending No att. providers found   Hosp Day # 2 PCP Galen Rocha MD     Date of Admission: 2018  Date hospice       -----------------------------------------------------------------------------------------------  PATIENT DISCHARGE INSTRUCTIONS: See electronic chart    Mahesh Watson NP, BONY  11/13/2018    Time spent:  > 30 minutes

## 2018-12-04 RX ORDER — ENOXAPARIN SODIUM 100 MG/ML
40 INJECTION SUBCUTANEOUS DAILY
OUTPATIENT
Start: 2018-12-04

## 2018-12-05 NOTE — DISCHARGE SUMMARY
[facility]    PATIENT'S NAME: Marissa Dodd   ATTENDING PHYSICIAN: Donny Kendall M.D.    PATIENT ACCOUNT#:   [de-identified]    LOCATION:  [address] [place] [address]  MEDICAL RECORD #:   [id number]       YOB: 1945  ADMISSION DATE:       09/25/2

## 2019-01-07 NOTE — PLAN OF CARE
Out of range INR  1.7  Patient's phone number [phone] Problem: CARDIOVASCULAR - ADULT  Goal: Maintains optimal cardiac output and hemodynamic stability  INTERVENTIONS:  - Monitor vital signs, rhythm, and trends  - Monitor for bleeding, hypotension and signs of decreased cardiac output  - Evaluate effectivenes walker, patient tolerated well and now up in chair

## 2019-02-28 VITALS — HEIGHT: 68 IN | DIASTOLIC BLOOD PRESSURE: 70 MMHG | HEART RATE: 72 BPM | SYSTOLIC BLOOD PRESSURE: 100 MMHG

## 2019-02-28 VITALS
WEIGHT: 293 LBS | HEART RATE: 76 BPM | HEIGHT: 68 IN | BODY MASS INDEX: 44.41 KG/M2 | DIASTOLIC BLOOD PRESSURE: 70 MMHG | SYSTOLIC BLOOD PRESSURE: 130 MMHG

## 2019-02-28 VITALS
DIASTOLIC BLOOD PRESSURE: 70 MMHG | SYSTOLIC BLOOD PRESSURE: 120 MMHG | BODY MASS INDEX: 44.41 KG/M2 | HEIGHT: 68 IN | WEIGHT: 293 LBS | HEART RATE: 100 BPM

## 2019-02-28 VITALS — HEART RATE: 92 BPM | SYSTOLIC BLOOD PRESSURE: 134 MMHG | WEIGHT: 282 LBS | DIASTOLIC BLOOD PRESSURE: 70 MMHG

## 2020-10-29 NOTE — PROGRESS NOTES
----- Message from Navya Shin sent at 10/29/2020  9:20 AM CDT -----  Regarding: Returning call  Contact: SonBrandon/ [phone]  Patient called in returning your call. Please advise.     [facility]  Progress Note      Williams Bernstein Patient Status:  Inpatient    1945 MRN [id number]   [facility] 4NW-A Attending Susana Barrett MD   Hosp Day # 0 PCP Swapnil Qureshi MD       Subjective:   No complaints today.   Placed o

## 2021-11-18 NOTE — TELEPHONE ENCOUNTER
1545  Pt arrived at the [facility]. [place] [address] Pt is alert and oriented, lying in bed on his left side. 1600  Pt up to the BR with assist.  Gait is steady. 1630    Lungs are clear. Pt is on RA. + bowel sounds. Last BM was after admission. Pt is continent of bowel and bladder. No edema in LE. Pt has ascites in his abd. Pt rates pain 8/10/  In his abd at the drain insertion site. Dressing is soiled. Dressing removed. Site cleaned,  Dressing replaced. Pt drain of 2,000ml of yellow fluid. Pt reports his pain is better. Skin is intact. Dr Gabriella brown. 1710  Pt medicated with Dilaudid IV.    1745  Pt sleeping. 1830  Pt sleeping. 1850  Pt up to the bathroom. Pt had a BM. 1900  Report given. NAME OF PATIENT:  Mecca Dang    LEVEL OF CARE:  GIP    REASON FOR GIP:   Pain, despite numerous changes in medications, Medication adjustment that must be monitored 24/7 and Stabilizing treatment that cannot take place at home    *PATIENT REMAINS ELIGIBLE FOR GIP LEVEL OF CARE AS EVIDENCED BY: (MUST BE ADDRESSED OF PATIENT GIP)  Pt is receiving IV medications for pain with frequent adjustments. Pt required daily draining of aspira drain. Family will need teaching. Frequent nursing assessments. REASON FOR RESPITE:  n/a    O2 SAFETY:  Ra    FALL INTERVENTIONS PROVIDED:   Implemented/recommended use of non-skid footwear, Implemented/recommended use of fall risk identification flag to all team members, Implemented/recommended assistive devices and encouraged their use, Implemented/recommended resources for alarm system (personal alarm, bed alarm, call bell, etc.) , Implemented/recommended environmental changes (remove hazards, lower bed, improve lighting, etc.) and Implemented/recommended increased supervision/assistance    INTERDISPLINARY COMMUNICATION/COLLABORATION:  Physician, MSW, [place] and RN, CNA    NEW MEDICATION INITIATION DOCUMENTATION:  No new medications initiated.      MD / Provider Spoke with Ana Lilia at [facility]. Pt was discharged from [facility] with nebulizer medication, but she does not have a nebulizer. She is requesting that an order for nebulizer be sent to [employer]. name consulted re: change in status / initiation of new medication:  n/a    New Symptom(s):  n/a    New Order(s):  n/a    Name of the person notified of the changes:  n/a    Name of person being taught:  n/a    Instructions given:  n/a    Side Effects taught:  n/a    Response to teaching: n/a      COMFORTABLE DYING MEASURE:  Is Patient/family satisfied with symptom level?  yes    DISCHARGE PLAN:  Pt will return home to his family and will continue to be followed by Home Hospice.

## 2024-08-16 NOTE — PLAN OF CARE
Problem: Impaired Activities of Daily Living  Goal: Achieve highest/safest level of independence in self care  Interventions:  - Assess ability and encourage patient to participate in ADLs to maximize function  - Promote sitting position while performing A c/o sore throat started yesterday. denies fever. patient is awake and alert, acting appropriately. lungs clear b/l. abdomen soft, nondistended. denies medical hx, nkda, vutd.

## 2024-09-23 NOTE — PLAN OF CARE
Assumed care @ 0730. Patient lethargic, responds with verbal stimuli. Chest tube draining sanguinous drainage. Respirations non-labored. Patient on Morphine drip 2mg/hr.
Noted no vital signs appreciated. Pronounced dead at [facility] 56 by me and charge RN,Julianne Coppola. Louie Mcneil covering for [address]  notified. [facility] notified. Amanda Mustafa notified. [facility] notified.   0630ConsuDr jensen
Pt drowsy but oriented x4. Pt having some increased SOB this AM, HR in 130-140s. MDs aware, Cardiology to see today. Hgb=6.2 this AM. ! Unit of PRBCs ordered. Dr. Guillen Thao in to see, wants palliative to see pt and she is not improving.  Specimens ordered f
Pt. Alert and oriented. SOB. Not speaking full sentences due to fatigue. Pt requested ativan before bed and it was given. Pt. On morphine drip receiving 2mg/hr. Pt. With 60cc drainage from chest tube.  Pt. Respirations decreased from 20 to 12 over the cours
Verbalizes/displays adequate comfort level or patient's stated pain goal Progressing      Achieves optimal ventilation and oxygenation Progressing        Lethargic,responds to pain,on Morphine drip for comfort. O2 on at [address] [facility].
Dr Thompson

## 2025-03-27 NOTE — PLAN OF CARE
Chief Complaint   Patient presents with    Well Adult    Physical     Pt is not fasting      HPI  Patient is here for wellness and is actively trying to lose weight. Her Heb B surface antibody testing last year was negative and she was advised to get Hep B vaccine. She just saw her OB who also advised to get this.    Patient also needs Tdap and agrees to receive this and would like vit D level checked as she is always low    ROS  As per HPI and all other systems reviewed and are negative      Past Medical History:    Esophageal reflux       History reviewed. No pertinent surgical history.    Social History     Socioeconomic History    Marital status:    Tobacco Use    Smoking status: Never     Passive exposure: Never    Smokeless tobacco: Never   Vaping Use    Vaping status: Never Used   Substance and Sexual Activity    Alcohol use: Not Currently    Drug use: Never       History reviewed. No pertinent family history.     Medications Ordered Prior to Encounter[1]      Objective  Vitals:    03/27/25 1055   BP: 118/86   Pulse: 90   Resp: 16   Temp: 98.5 °F (36.9 °C)   TempSrc: Temporal   SpO2: 97%   Weight: 177 lb 3.2 oz (80.4 kg)   Height: 5' 1.02\" (1.55 m)         Body mass index is 33.46 kg/m².    Physical Exam  Constitutional:       Appearance: Normal appearance.   HEENT:      Head: Normocephalic and atraumatic.      Eyes: PERRLA no notable nystagmus     Ears: normal on observation     Nose: Nose normal.      Mouth: Mucous membranes are moist.      Neck: no masses no bruit  Cardiovascular:      Rate and Rhythm: Normal rate and regular rhythm.   Pulmonary:      Effort: Pulmonary effort is normal.      Breath sounds: Normal breath sounds.   Abdominal:      General: Bowel sounds are normal.      Palpations: Abdomen is soft. There is no mass.   Musculoskeletal:         General: Normal range of motion.      Cervical back: Normal range of motion.   Skin:     General: Skin is warm and dry.   Neurological:       Problem: RESPIRATORY - ADULT  Goal: Achieves optimal ventilation and oxygenation  INTERVENTIONS:  - Assess for changes in respiratory status  - Assess for changes in mentation and behavior  - Position to facilitate oxygenation and minimize respiratory effo General: No focal deficit present.      Mental Status: She is alert and oriented to person, place, and time.   Psychiatric:         Mood and Affect: Mood normal.         Thought Content: Thought content normal.       Assessment and Plan  1. Well adult exam  - CBC With Differential With Platelet; Future  - Comp Metabolic Panel (14); Future  - TSH and Free T4; Future  - Lipid Panel; Future    2. Need for Tdap vaccination  - TdaP (Adacel, Boostrix) [32944]    3. Need for hepatitis B vaccination  - Hep B, Adult [07627]    4. Sleep apnea, unspecified type  - ENT Referral - In Network    5. Hypovitaminosis D  - Vitamin D [E]; Future         Follow up  No follow-ups on file.      Patient Instructions  Patient Instructions   Repeat hep b vaccine in 1 month and then again in 6 months and we will check titers in 8 months       Duane Warner MD       This note was created by [employer] voice recognition. Errors in content may be related to improper recognition by the system; efforts to review and correct have been done but errors may still exist. Please be advised the primary purpose of this note is for me to communicate medical care. Standard sentence structure is not always used. Medical terminology and medical abbreviations may be used. There may be grammatical, typographical, and automated fill ins that may have errors missed in proofreading.          [1]   Current Outpatient Medications on File Prior to Visit   Medication Sig Dispense Refill    Drospirenone-Estetrol (NEXTSTELLIS) 3-14.2 MG Oral Tab Take 1 tablet by mouth daily. (Patient not taking: Reported on 3/27/2025) 84 tablet 0     No current facility-administered medications on file prior to visit.

## (undated) DEVICE — PAD SANITARY 10\" STERILE

## (undated) DEVICE — LAPAROTOMY CDS: Brand: MEDLINE INDUSTRIES, INC.

## (undated) DEVICE — SUTURE VICRYL 2-0 MO-6

## (undated) DEVICE — SUTURE CHROMIC GUT 2-0 SH

## (undated) DEVICE — BLADE ELECTRODE: Brand: EDGE

## (undated) DEVICE — Device

## (undated) DEVICE — SPECIMEN CONTAINER,POSITIVE SEAL INDICATOR, OR PACKAGED: Brand: PRECISION

## (undated) DEVICE — PROXIMATE RELOADABLE LINEAR CUTTER WITH SAFETY LOCK-OUT, 75MM: Brand: PROXIMATE

## (undated) DEVICE — SOL  .9 1000ML BTL

## (undated) DEVICE — GLOVE BIOGEL M SURG SZ 6-1/2

## (undated) DEVICE — PROXIMATE LINEAR CUTTER RELOAD, BLUE, 75MM: Brand: PROXIMATE

## (undated) DEVICE — BULB SYRINGE,IRRIGATION WITH PROTECTIVE CAP: Brand: DOVER

## (undated) DEVICE — VIOLET BRAIDED (POLYGLACTIN 910), SYNTHETIC ABSORBABLE SUTURE: Brand: COATED VICRYL

## (undated) DEVICE — LAPAROTOMY SPONGE - RF AND X-RAY DETECTABLE PRE-WASHED: Brand: SITUATE

## (undated) DEVICE — PROXIMATE SKIN STAPLERS (35 WIDE) CONTAINS 35 STAINLESS STEEL STAPLES (FIXED HEAD): Brand: PROXIMATE

## (undated) DEVICE — SUTURE PDS II 1 TP-1

## (undated) DEVICE — KENDALL SCD EXPRESS SLEEVES, KNEE LENGTH, MEDIUM: Brand: KENDALL SCD

## (undated) DEVICE — GLOVE BIOGEL M SURG SZ 71/2

## (undated) DEVICE — HEMOCLIP HORIZON MED 002200

## (undated) DEVICE — ABDOMINAL PAD: Brand: DERMACEA

## (undated) NOTE — LETTER
Consent to Procedure/Sedation    Date: 10/19/2018    Time: _______________    1. I authorize the performance upon Jae Lebron the following:    Inferior Vena Cava Filter Insertion    2.  I authorize Dr. Tapan Templeton (and whomever is designated as the doctor’s as Witness: ____________________     Date: ______________    Printed: 10/19/2018   7:46 PM    Patient Name: Harpal Swanson        : 1945       Medical Record #: [id number]

## (undated) NOTE — LETTER
Rosio Nelson [address] [facility] [address], [address] [facility]     PICC LINE INSERTION CONSENT     I agree to have a Peripherally Inserted Central Catheter (PICC) placed in my arm.    1. The PICC insertion procedure, care, maintenance, risks, benefits, and c goals; and potential problems that might occur during recuperation.  They also discussed reasonable alternatives to the PICC, including risks, benefits, and side effects related to the alternatives and risks related to not receiving this procedure      ____

## (undated) NOTE — IP AVS SNAPSHOT
[address]            (For Outpatient Use Only) Initial Admit Date: 9/25/2018   Inpt/Obs Admit Date: Inpt: 9/25/18 / Obs: N/A   Discharge Date:    Ady Dquue:  [de-identified]   MRN: [de-identified]   CSN: [id number]        ENCOUNTER  Patient Subscriber ID:  Pt Rel to Subscriber:    Hospital Account Financial Class: Medicare    October 3, 2018

## (undated) NOTE — IP AVS SNAPSHOT
Patient Demographics     Address  [place] [address] Phone  [phone] [facility])  [phone] (Mobile) *Preferred* E-mail Address  [email or web address]      Emergency Contact(s)     Name Relation Home Work [facility] [phone] Commonly known as:  LOTENSIN  Next dose due:  TONIGHT      TAKE 1 TABLET BY MOUTH TWO  TIMES DAILY   Pascual Navarro MD         colchicine 0.6 MG Tabs  Next dose due:   Tomorrow      TAKE 1 TABLET BY MOUTH  DAILY   Pascual Navarro MD         ferrous sulfate 325 ( Unique Deras MD         predniSONE 20 MG Tabs  Commonly known as:  Cristobal Dom  Next dose due:   Tomorrow      2 pills a day for 7 days, then 1 pill a day for 7 days and stop   Giovanni Castorena MD         Rivaroxaban 20 MG Tabs  Commonly known as:  X [id number] ferrous sulfate EC tab 325 mg 05/01/18 0807 Given      [id number] furosemide (LASIX) injection 40 mg 05/01/18 0807 Given      [id number] ipratropium-albuterol (DUONEB) nebulizer solution 3 mL 04/30/18 1511 Given      [id number] ipratropium-albutero Flowsheet Row Most Recent Value   Mode  Spontaneous/Timed   Interface  Full face mask   Mask size  Medium   Set rate  14 breaths/min   Set IPAP  12   Set EPAP  6   O2%  30 %   I time  0.9         Lab Results Last 24 Hours      BASIC METABOLIC PANEL (8) [84 Blood Culture FREQ X 2 [[id number]] Collected:  04/24/18 1846    Order Status:  Completed Lab Status:  Final result Updated:  04/29/18 1900    Specimen:  Blood from Blood,peripheral      Blood Culture Result No Growth 5 Days    Sputum culture Once [[id number] Author:  Mare Wilcox DO Service:  Hospitalist Author Type:  Physician    Filed:  4/24/2018 11:39 PM Date of Service:  4/24/2018  8:07 PM Status:  Addendum    :  Mare Wilcox DO (Physician)    Related Notes:  Original N 1996: BIOPSY/REMOVAL, LYMPH NODE(S)  11/9/01: COLONOSCOPY      Comment: (fiberoptic) Keerthi Chew MD  repeat 5 yrs  1996: LUMPECTOMY RIGHT      Comment: RT.LUMPECTOMY 1996  No date: MIMI BIOPSY STEREOTACTIC NODULE 2 SITE BILAT      Comment: 07/2010 STEREO total) rectally 2 (two) times daily. Disp: 12 suppository Rfl: 0   ammonium lactate 12 % External Lotion Apply thin amount to right and left lower extremity daily.  Disp: 1 Bottle Rfl: 0   Miconazole Nitrate 2 % External Powder Apply to pannus skin folds tw Neurologic: No focal neurological deficits. CNII-XII grossly intact. Musculoskeletal: Moves all extremities. Extremities: No edema or cyanosis. Integument: No rashes or lesions. Psychiatric: Appropriate mood and affect.       Diagnostic Data:      Labs with correction factor as needed. 8.  Morbid Obesity-BMI is 55. 55[RZ.2]  9. Possible lower extremity cellulitis-covered with Zosyn. [RZ.3]      Quality:  · DVT Prophylaxis: Lovenox  · CODE status: Full  · Conklin: None    Plan of care discussed with patient extremities, breast cancer presents emergency room with shortness of breath patient states she started not short of breath about a week ago was seen by primary care that comes to the nursing home and thought she should be taken to the emergency room.   Abbey • Asthma Maternal Grandmother        Allergies:   Adhesive Tape             Iodine (Topical)        Itching    Medications:    No current facility-administered medications on file prior to encounter.    Current Outpatient Prescriptions on File Prior to [facility] COLCHICINE 0.6 MG Oral Tab TAKE 1 TABLET BY MOUTH  DAILY Disp: 90 tablet Rfl: 0   FLUVIRIN Intramuscular Suspension ADM 0.5ML IM UTD Disp:  Rfl: 0   Rivaroxaban 20 MG Oral Tab Take 1 tablet (20 mg total) by mouth daily with food.  Disp: 30 tablet Rfl: 0   A Imaging: Imaging data reviewed in Epic. ASSESSMENT / PLAN:     1. Pneumonia-we will continue on Zosyn and doxycycline. Blood cultures pending. Sputum culture ordered.   2. Acute respiratory failure with hypoxia-secondary fluid overload versus COPD.pa RZ.2 - Rock Shoaib DO on 4/24/2018 11:37 PM               H&P signed by Rock Shoaib DO at 4/24/2018 11:35 PM  Version 1 of 3    Author:  Rock Shoaib DO Service:  Hospitalist Author Type:  Physician    Filed: • Other lymphedema     right arm   • Shortness of breath    • Unspecified sinusitis (chronic)         Past Surgical History: Past Surgical History:  1996: BIOPSY/REMOVAL, LYMPH NODE(S)  11/9/01: COLONOSCOPY      Comment: (fiberoptic) Jenny Umaña MD  rep daily with food. Disp: 30 tablet Rfl: 2   Hydrocortisone Acetate 25 MG Rectal Suppos Place 1 suppository (25 mg total) rectally 2 (two) times daily.  Disp: 12 suppository Rfl: 0   ammonium lactate 12 % External Lotion Apply thin amount to right and left low Abdomen: Soft, nontender, nondistended. Positive bowel sounds. No rebound, guarding or organomegaly. Neurologic: No focal neurological deficits. CNII-XII grossly intact. Musculoskeletal: Moves all extremities. Extremities: No edema or cyanosis.   Integu Quality:  · DVT Prophylaxis: Lovenox  · CODE status: Full  · Conklin: None    Plan of care discussed with patient at bedside.     Yung Jeffries DO  8/81/1165          **Certification      PHYSICIAN Certification of Need for Inpatient Hospit chronic LE   • Infectious mononucleosis    • Other lymphedema     right arm   • Shortness of breath    • Unspecified sinusitis (chronic)      Past Surgical History:  1996: BIOPSY/REMOVAL, LYMPH NODE(S)  11/9/01: COLONOSCOPY      Comment: (fiberoptic) Step IVPB, 100 mg, Intravenous, Q12H  •  AmLODIPine Besylate (NORVASC) tab 10 mg, 10 mg, Oral, Daily  •  ammonium lactate (LAC-HYDRIN) 12 % lotion, , Topical, PRN  •  atorvastatin (LIPITOR) tab 10 mg, 10 mg, Oral, Daily  •  lisinopril (PRINIVIL,ZESTRIL) tab 20 (37.1 °C)    Wt Readings from Last 3 Encounters:  04/25/18 : (!) 365 lb 11.9 oz (165.9 kg)  10/25/16 : (!) 331 lb (150.1 kg)  07/27/16 : (!) 321 lb (145.6 kg)      Telemetry: atrial fibrillation  General: Alert and oriented in no apparent distress.   HEENT: DM type 2 with diabetic peripheral neuropathy (HCC)     Metabolic alkalosis     Respiratory acidosis     Respiratory distress     Hyperglycemia     Nosocomial pneumonia     Wheezing     Acute respiratory failure with hypoxia (HCC)     Gram-negative pneu Acute respiratory failure with hypoxia (HCC)    Gram-negative pneumonia ([facility] [address]    Pulmonary hypertension (HCC)[NL.2]      Past Medical History[NL.1]  Past Medical History:   Diagnosis Date   • Arrhythmia    • [facility]   • Cancer ([facility]     dalton How much help from another person does the patient currently need. .. [NL.1]   -   Moving to and from a bed to a chair (including a wheelchair)?: None   -   Need to walk in hospital room?: None   -   Climbing 3-5 steps with a railing?: A Lot[NL. 2]       AM-P ambulation and HEP to maintain current level of mobility. The rehab aide will perform treatment activities prescribed by this physical therapist. The rehab aide will communicate with overseeing PT regarding any change in functional mobility. RN aware. English muffin and thin hot tea) with no clinical s/s aspiration precautions. Reviewed aspiration precautions with pt. Agreement verbalized and pt able to return demonstrate.      CXR 4/26/18  Impression   CONCLUSION: Decrease in the right lower lobe opac

## (undated) NOTE — ED AVS SNAPSHOT
Jae Lebron   MRN: [id number]    Department:  [facility] Emergency Department   Date of Visit:  9/6/2018           Disclosure     Insurance plans vary and the physician(s) referred by the ER may not be covered by your plan.  Please contact your tell this physician (or your personal doctor if your instructions are to return to your personal doctor) about any new or lasting problems. The primary care or specialist physician will see patients referred from the [facility] Emergency Department.  Isaiah Rodriguez

## (undated) NOTE — IP AVS SNAPSHOT
[address]            (For Outpatient Use Only) Initial Admit Date: 4/24/2018   Inpt/Obs Admit Date: Inpt: 4/24/18 / Obs: N/A   Discharge Date:    Myra Cruz:  [de-identified]   MRN: [de-identified]   CSN: [id number]        ENCOUNTER  Patient Subscriber ID:  Pt Rel to Subscriber:    Hospital Account Financial Class: Medicare    May 1, 2018

## (undated) NOTE — LETTER
Rosio Nelson [address] [address]  [place], [address] [facility]    Consent for Operation  Date: _______10/13/18___________    Time: ______1730_________    1.  I authorize the performance upon Ashley Chavira the following operation:    ultrasound guided right videotape. [facility] will not be responsible for storage or maintenance of this tape. 6. For the purpose of advancing medical education, I consent to the admittance of observers to the Operating Room.     7. I authorize the use of any specimen, organs Signature of Patient:   ___________________________    When the patient is a minor or mentally incompetent to give consent:  Signature of person authorized to consent for patient: ___________________________   Relationship to patient: _____________________

## (undated) NOTE — Clinical Note
TE sent to triage regarding HFU appt- she states she is usually seen at [facility]. thank you!

## (undated) NOTE — LETTER
Consent to Procedure/Sedation    Date: 9/26/2018    Time: _______________    1. I authorize the performance upon Russ Jackson the following:    Peripherally Inserted Central Catheter    2.  I authorize Dr. Donte Del Castillo whomever is designated as the doct ___________________________    ___________________    Witness: ____________________     Date: ______________    Printed: 2018   11:33 AM    Patient Name: Denisha Jurado        : 1945       Medical Record #: [id number]

## (undated) NOTE — LETTER
June 13, 2018    Evy Alaniz 83    Dear Hernandez More:  It was a pleasure speaking with you over the phone recently.  To follow up, I wanted to send you some contact information to utilize when you have a question an

## (undated) NOTE — LETTER
Consent to Procedure/Sedation    Date: __________________    Time: _______________    1. I authorize the performance upon Lynda Dhaliwal the following:    Peripherally inserted central catheter placement     2.  I authorize Dr. Milton Hutchison  (and whomever is design ___________________________    ___________________    Witness: ____________________     Date: ______________    Printed: 10/9/2018   4:38 PM    Patient Name: Ceciljoy Tellydana        : 1945       Medical Record #: [id number]

## (undated) NOTE — IP AVS SNAPSHOT
Patient Demographics     Address  [place] [id number] Phone  [phone] [facility])  [phone] (Mobile) *Preferred* E-mail Address  [email or web address]      Emergency Contact(s)     Name Relation Home Work [facility] [phone] Inject 0.4 mL (40 mg total) into the skin daily for 7 days. Stop taking on:  10/10/2018   Fahad Dewey MD         furosemide 20 MG Tabs  Commonly known as:  LASIX  Next dose due:  10/03      Take 20 mg by mouth 2 (two) times daily.           Levothyroxin #1) 10/03/18 1323 Given      [id number] simethicone (MYLICON) chewable tab 80 mg 10/02/18 1902 Given            LEFT LOWER ABDOMEN     Order ID Medication Name Action Time Action Reason Comments    [id number] Enoxaparin Sodium (LOVENOX) 40 MG/0.4ML injection Urine Culture, Routine Once [[id number]] Collected:  09/26/18 0742    Order Status:  Completed Lab Status:  Final result Updated:  09/27/18 1056    Specimen:  Urine, clean catch      Urine Culture No Growth at 18-24 hrs.     MRSA Culture Only Once [[id number] REVIEW OF SYSTEMS:  Difficulty walking because of bad knees, using a walker. History of atrial fibrillation but reports no chest pain, discomfort, and palpitations. Appetite bad. Complain of bloating and fullness. History of anxiety.        [facility] PCP Mayank Joshi MD     Reason for Consultation:[DM.1] Intensive care management, postop care following extensive abdominal surgery for invasive malignant tumor. [DM.2]    History of Present Illness:  Ashley Chavira is a a(n) 67year old femal 1996: LUMPECTOMY RIGHT      Comment:  RT.LUMPECTOMY 1996  No date: MIMI BIOPSY STEREOTACTIC NODULE 2 SITE BILAT      Comment:  07/2010 STEREO BX. BN.  No date: RADIATION RIGHT  1995: THYROIDECTOMY      Comment:  Total  Family History   Problem Relation Age /52   Pulse 87   Temp 97.6 °F (36.4 °C) (Temporal)   Resp 14   Ht 5' 5\" (1.651 m)   Wt 285 lb 9.6 oz (129.5 kg)   SpO2 100%   BMI 47.53 kg/m²     Intake/Output:    Intake/Output Summary (Last 24 hours) at 9/26/2018 0701  Last data filed at 9/26/2018 JUSTINE, RAY, DEV, THGB in the last 168 hours. Invalid input(s): [id number], CHOB      Cultures:[DM.1] No new/positive culture results[DM. 3]    Radiology:[DM.1] Chest x-ray with what appear to be pulmonary nodules that are new since May of this year.     Cruz Electronically signed by Misael Hameed, Alanis Chavez MD on 9/26/2018  7:38 AM   Attribution Key    DM. 1 - Vesta Valdes, Alanis Chavez MD on 9/26/2018  7:01 AM  DM. 2 - Vesta Valdes, Alanis Chavez MD on 9/26/2018  7:28 AM  DM. 3 - Vesta Valdes, Alanis Chavez MD on 9/26/2 • Shortness of breath    • Unspecified sinusitis (chronic)        Past Surgical History  Past Surgical History:  9/25/2018: ABDOMINAL HYSTERECTOMY TOTAL BSO STAGING; Bilateral      Comment:  Performed by Mena Olivas MD at [facility]: BIOPSY/RE -   Climbing 3-5 steps with a railing?: A Lot       AM-PAC Score:  Raw Score: 15   PT Approx Degree of Impairment Score: 57.7%   Standardized Score (AM-PAC Scale): 39.45   CMS Modifier (G-Code): CK    FUNCTIONAL ABILITY STATUS  Gait Assessment   Gait Felix DISCHARGE RECOMMENDATIONS  PT Discharge Recommendations: Sub-acute rehabilitation(ELOS is 10-12 days)     PLAN  PT Treatment Plan: Bed mobility; Body mechanics; Endurance; Patient education;Gait training;Strengthening;Transfer training;Balance training  Rehab sensation. Pt has been educated re: aspiration precautions and able to return demonstrate. No additional ST warranted at this time as pt tolerating LRD with no csa. Diet Recommendations - Solids: Regular  Diet Recommendations - Liquid:  Thin       Aspi